# Patient Record
Sex: FEMALE | Race: WHITE | Employment: OTHER | URBAN - NONMETROPOLITAN AREA
[De-identification: names, ages, dates, MRNs, and addresses within clinical notes are randomized per-mention and may not be internally consistent; named-entity substitution may affect disease eponyms.]

---

## 2017-02-08 ENCOUNTER — HOSPITAL ENCOUNTER (OUTPATIENT)
Dept: MRI IMAGING | Age: 62
Discharge: HOME | End: 2017-02-08
Payer: COMMERCIAL

## 2017-02-08 DIAGNOSIS — M25.562 ACUTE PAIN OF LEFT KNEE: ICD-10-CM

## 2017-02-08 PROCEDURE — 73721 MRI JNT OF LWR EXTRE W/O DYE: CPT

## 2017-03-23 ENCOUNTER — EMPLOYEE WELLNESS (OUTPATIENT)
Dept: OTHER | Age: 62
End: 2017-03-23

## 2017-03-23 LAB
CHOLESTEROL, TOTAL: 222 MG/DL (ref 160–199)
GLUCOSE BLD-MCNC: 90 MG/DL (ref 74–109)
HDLC SERPL-MCNC: 62 MG/DL (ref 65–121)
LDL CHOLESTEROL CALCULATED: 127 MG/DL
TRIGL SERPL-MCNC: 167 MG/DL (ref 150–199)

## 2017-09-22 LAB
AVERAGE GLUCOSE: NORMAL
HBA1C MFR BLD: 5.7 %

## 2017-09-29 RX ORDER — FUROSEMIDE 40 MG/1
40 TABLET ORAL DAILY
COMMUNITY
End: 2018-05-02 | Stop reason: SDUPTHER

## 2017-09-29 RX ORDER — MULTIVIT-MIN/IRON/FOLIC ACID/K 18-600-40
CAPSULE ORAL
COMMUNITY

## 2017-09-29 RX ORDER — ACETAMINOPHEN 160 MG
TABLET,DISINTEGRATING ORAL 2 TIMES DAILY
COMMUNITY

## 2017-09-29 RX ORDER — FOLIC ACID 0.8 MG
500 TABLET ORAL DAILY
COMMUNITY
End: 2017-10-02 | Stop reason: ALTCHOICE

## 2017-09-29 RX ORDER — LOSARTAN POTASSIUM 50 MG/1
50 TABLET ORAL DAILY
COMMUNITY
End: 2018-04-13 | Stop reason: SDUPTHER

## 2017-09-29 RX ORDER — ASPIRIN 81 MG/1
81 TABLET ORAL DAILY
COMMUNITY

## 2017-09-29 RX ORDER — DICLOFENAC SODIUM 75 MG/1
75 TABLET, DELAYED RELEASE ORAL 2 TIMES DAILY
COMMUNITY
End: 2019-06-11 | Stop reason: ALTCHOICE

## 2017-09-29 RX ORDER — HYDROCODONE BITARTRATE AND ACETAMINOPHEN 5; 325 MG/1; MG/1
1 TABLET ORAL DAILY PRN
COMMUNITY
End: 2017-10-02 | Stop reason: SDUPTHER

## 2017-09-29 RX ORDER — CYCLOBENZAPRINE HCL 10 MG
10 TABLET ORAL NIGHTLY PRN
COMMUNITY
End: 2017-10-02 | Stop reason: SDUPTHER

## 2017-10-02 ENCOUNTER — OFFICE VISIT (OUTPATIENT)
Dept: FAMILY MEDICINE CLINIC | Age: 62
End: 2017-10-02
Payer: COMMERCIAL

## 2017-10-02 VITALS
HEART RATE: 88 BPM | RESPIRATION RATE: 18 BRPM | BODY MASS INDEX: 36.82 KG/M2 | DIASTOLIC BLOOD PRESSURE: 86 MMHG | WEIGHT: 221 LBS | OXYGEN SATURATION: 96 % | TEMPERATURE: 97.5 F | SYSTOLIC BLOOD PRESSURE: 138 MMHG | HEIGHT: 65 IN

## 2017-10-02 DIAGNOSIS — M54.50 CHRONIC MIDLINE LOW BACK PAIN WITHOUT SCIATICA: ICD-10-CM

## 2017-10-02 DIAGNOSIS — G56.03 BILATERAL CARPAL TUNNEL SYNDROME: ICD-10-CM

## 2017-10-02 DIAGNOSIS — I82.409 RECURRENT DEEP VEIN THROMBOSIS (DVT) (HCC): ICD-10-CM

## 2017-10-02 DIAGNOSIS — I26.99 RECURRENT PULMONARY EMBOLISM (HCC): ICD-10-CM

## 2017-10-02 DIAGNOSIS — G89.29 CHRONIC MIDLINE LOW BACK PAIN WITHOUT SCIATICA: ICD-10-CM

## 2017-10-02 DIAGNOSIS — E78.01 FAMILIAL HYPERCHOLESTEROLEMIA: ICD-10-CM

## 2017-10-02 DIAGNOSIS — G47.33 OBSTRUCTIVE SLEEP APNEA SYNDROME: ICD-10-CM

## 2017-10-02 DIAGNOSIS — I10 ESSENTIAL (PRIMARY) HYPERTENSION: Primary | ICD-10-CM

## 2017-10-02 DIAGNOSIS — F41.1 GENERALIZED ANXIETY DISORDER: ICD-10-CM

## 2017-10-02 PROCEDURE — 99214 OFFICE O/P EST MOD 30 MIN: CPT | Performed by: FAMILY MEDICINE

## 2017-10-02 RX ORDER — CYCLOBENZAPRINE HCL 10 MG
10 TABLET ORAL NIGHTLY PRN
Qty: 30 TABLET | Refills: 0 | Status: SHIPPED | OUTPATIENT
Start: 2017-10-02 | End: 2018-05-01 | Stop reason: SDUPTHER

## 2017-10-02 RX ORDER — HYDROCODONE BITARTRATE AND ACETAMINOPHEN 5; 325 MG/1; MG/1
1 TABLET ORAL DAILY PRN
Qty: 15 TABLET | Refills: 0 | Status: SHIPPED | OUTPATIENT
Start: 2017-10-02 | End: 2018-05-01 | Stop reason: SDUPTHER

## 2017-10-02 NOTE — MR AVS SNAPSHOT
After Visit Summary             Shaw Gonsalves   10/2/2017 3:00 PM   Office Visit    Description:  Female : 1955   Provider:  Sabrina Farley MD   Department:  [facility]              Your Follow-Up and Future Appointments         Below is a list of your follow-up and future appointments. This may not be a complete list as you may have made appointments directly with providers that we are not aware of or your providers may have made some for you. Please call your providers to confirm appointments. It is important to keep your appointments. Please bring your current insurance card, photo ID, co-pay, and all medication bottles to your appointment. If self-pay, payment is expected at the time of service. Your To-Do List     Future Appointments Provider Department Dept Phone    2018 4:15 PM Sabrina Farley MD [address] [phone]    If this is a sports or school physical please bring the physical form with you. Follow-Up    Return in about 6 months (around 2018) for Yearly Physical.         Information from Your Visit        Department     Name Address Phone Fax    [address] [address] [place] [address] [phone]      You Were Seen for:         Comments    Essential (primary) hypertension   [092174]         Vital Signs     Blood Pressure Pulse Temperature Respirations Height Weight    138/86 88 97.5 °F (36.4 °C) 18 5' 5\" (1.651 m) 221 lb (100.2 kg)    Oxygen Saturation Body Mass Index Smoking Status             96% 36.78 kg/m2 Passive Smoke Exposure - Never Smoker         Additional Information about your Body Mass Index (BMI)           Your BMI as listed above is considered obese (30 or more). BMI is an estimate of body fat, calculated from your height and weight.   The higher your BMI, the greater your risk of heart disease, high blood pressure, type 2 diabetes, stroke, gallstones, arthritis, sleep apnea, and certain cancers. BMI is not perfect. It may overestimate body fat in athletes and people who are more muscular. Even a small weight loss (between 5 and 10 percent of your current weight) by decreasing your calorie intake and becoming more physically active will help lower your risk of developing or worsening diseases associated with obesity. Learn more at: [email or web address]             Today's Medication Changes          These changes are accurate as of: 10/2/17  3:36 PM.  If you have any questions, ask your nurse or doctor. STOP taking these medications           Ginger Root 550 MG Caps   Stopped by:  Ellen Pinto MD       Magnesium 500 MG Caps   Stopped by:  Ellen Pinto MD            Where to Get Your Medications      These medications were sent to [address] [phone]  [address] [place] [address]     Phone:  [phone]     cyclobenzaprine 10 MG tablet    HYDROcodone-acetaminophen 5-325 MG per tablet               Your Current Medications Are              metFORMIN (GLUCOPHAGE) 500 MG tablet Take 500 mg by mouth 2 times daily (with meals)    HYDROcodone-acetaminophen (NORCO) 5-325 MG per tablet Take 1 tablet by mouth daily as needed for Pain .     cyclobenzaprine (FLEXERIL) 10 MG tablet Take 1 tablet by mouth nightly as needed for Muscle spasms    aspirin 81 MG EC tablet Take 81 mg by mouth daily    diclofenac (VOLTAREN) 75 MG EC tablet Take 75 mg by mouth 2 times daily    apixaban (ELIQUIS) 5 MG TABS tablet Take 5 mg by mouth 2 times daily    furosemide (LASIX) 40 MG tablet Take 40 mg by mouth daily    losartan (COZAAR) 50 MG tablet Take 50 mg by mouth daily    metoprolol tartrate (LOPRESSOR) 25 MG tablet Take 12.5 mg by mouth 2 times daily    Ascorbic Acid (VITAMIN C) 500 MG CAPS Take by mouth 3. Enter your [employer] Access Code exactly as it appears below. You will not need to use this code after youve completed the sign-up process. If you do not sign up before the expiration date, you must request a new code. [employer] Access Code: [email or web address]  Expires: 12/1/2017  3:36 PM    4. Enter your Social Security Number ([id number]) and Date of Birth (mm/dd/yyyy) as indicated and click Submit. You will be taken to the next sign-up page. 5. Create a s[employer]ox ID. This will be your s[employer] login ID and cannot be changed, so think of one that is secure and easy to remember. 6. Create a [employer] password. You can change your password at any time. 7. Enter your Password Reset Question and Answer. This can be used at a later time if you forget your password. 8. Enter your e-mail address. You will receive e-mail notification when new information is available in [address] 9. Click Sign Up. You can now view your medical record. Additional Information  If you have questions, please contact the physician practice where you receive care. Remember, [employer] is NOT to be used for urgent needs. For medical emergencies, dial 911. For questions regarding your [employer] account call [phone]. If you have a clinical question, please call your doctor's office.

## 2017-10-02 NOTE — PROGRESS NOTES
TOTAL ABDOMINAL      KNEE ARTHROSCOPY      TUBAL LIGATION         Family History   Problem Relation Age of Onset    Heart Disease Mother     Heart Disease Father        Social History   Substance Use Topics    Smoking status: Passive Smoke Exposure - Never Smoker    Smokeless tobacco: Never Used    Alcohol use Not on file      Current Outpatient Prescriptions   Medication Sig Dispense Refill    metFORMIN (GLUCOPHAGE) 500 MG tablet Take 500 mg by mouth 2 times daily (with meals)      HYDROcodone-acetaminophen (NORCO) 5-325 MG per tablet Take 1 tablet by mouth daily as needed for Pain . 15 tablet 0    cyclobenzaprine (FLEXERIL) 10 MG tablet Take 1 tablet by mouth nightly as needed for Muscle spasms 30 tablet 0    aspirin 81 MG EC tablet Take 81 mg by mouth daily      diclofenac (VOLTAREN) 75 MG EC tablet Take 75 mg by mouth 2 times daily      apixaban (ELIQUIS) 5 MG TABS tablet Take 5 mg by mouth 2 times daily      furosemide (LASIX) 40 MG tablet Take 40 mg by mouth daily      losartan (COZAAR) 50 MG tablet Take 50 mg by mouth daily      metoprolol tartrate (LOPRESSOR) 25 MG tablet Take 12.5 mg by mouth 2 times daily      Ascorbic Acid (VITAMIN C) 500 MG CAPS Take by mouth      Cholecalciferol (VITAMIN D3) 2000 units CAPS Take by mouth 2 times daily       No current facility-administered medications for this visit. Allergies   Allergen Reactions    Ceclor [Cefaclor]     Ciprofloxacin Hcl     Ultram [Tramadol]     Zocor [Simvastatin]        Health Maintenance   Topic Date Due    Hepatitis C screen  1955    HIV screen  10/23/1972    DTaP/Tdap/Td vaccine (1 - Tdap) 10/23/1976    Colon cancer screen colonoscopy  10/23/2007    Breast cancer screen  11/04/2016    Flu vaccine (1) 09/01/2017    Lipid screen  09/03/2017       Subjective:      Review of Systems  Review of Systems   Constitutional: Negative for chills and fever. HENT: Negative for congestion.     Respiratory: Negative for cough, chest tightness and shortness of breath. Cardiovascular: Negative for chest pain, palpitations and leg swelling. Gastrointestinal: Negative for abdominal pain, anal bleeding, constipation, diarrhea and nausea. Genitourinary: Negative for difficulty urinating. Psychiatric/Behavioral: Negative. See HPI for visit specific review of symptoms. All others negative      Objective:   /86  Pulse 88  Temp 97.5 °F (36.4 °C)  Resp 18  Ht 5' 5\" (1.651 m)  Wt 221 lb (100.2 kg)  SpO2 96%  BMI 36.78 kg/m2  Physical Exam  Physical Exam   Constitutional: appears well-developed. does not appear ill. Eyes: Pupils are equal, round, and reactive to light. Neck: Normal range of motion. Neck supple. Cardiovascular: Normal rate and regular rhythm. Exam reveals no friction rub. No murmur heard. Pulmonary/Chest: Effort normal and breath sounds normal. No respiratory distress. He has no wheezes. no rales. Abdominal: Soft. Bowel sounds are normal. He exhibits no distension. There is no tenderness. There is no rebound and no guarding. Musculoskeletal: exhibits no edema. Neurological: alert. Psychiatric: normal mood and affect. behavior is normal.       No results found for this or any previous visit (from the past 672 hour(s)). Glucose 90    Cholesterol 223  Triglycerides 208  HDL 53      A1c 5.7    Assessment & Plan: The following diagnoses and conditions are stable with no further orders unless indicated:  1. Essential (primary) hypertension  Borderline today. Continue with current medication. Discussed lifestyle changes such as diet and exercise. Recommended eliminate processed food from diet such as sugar and fried foods. Recommended exercising at least 150 minutes/week. Try to do full body resistance training twice a week as well. Offered suggestions for calorie counting such as phone apps and online resources such as My fitness pal and Lose it.   Discussed healthy weight. 2. Bilateral carpal tunnel syndrome  Symptoms are stable. Recommend wearing wrist braces    3. Generalized anxiety disorder  Overall symptoms are currently stable. 4. Familial hypercholesterolemia  Cholesterol is still elevated. Encouraged lifestyle changes    5. Chronic midline low back pain without sciatica  She tries to take this medication sparingly. Discussed risk and benefits of taking opioids. Risks include addiction and tolerance. If develops impairment or over sedation with medication, discontinue and contact me. Do not take medication with alcohol. Advised to take sparingly. Advised to keep medication hidden and locked up as risk of theft is high with these medications as well.       - HYDROcodone-acetaminophen (NORCO) 5-325 MG per tablet; Take 1 tablet by mouth daily as needed for Pain . Dispense: 15 tablet; Refill: 0  - cyclobenzaprine (FLEXERIL) 10 MG tablet; Take 1 tablet by mouth nightly as needed for Muscle spasms  Dispense: 30 tablet; Refill: 0    6. Obstructive sleep apnea syndrome  Continue his CPAP    7. Recurrent deep vein thrombosis (DVT) (HCC)  Remain on anticoagulation    8. Recurrent pulmonary embolism ([address]  Remain on anticoagulation          Return in about 6 months (around 4/2/2018) for Yearly Physical.           Discussed use, benefit, and side effects of prescribed medications. All patient questions answered. Pt voiced understanding. Reviewed health maintenance. Instructed to continue current medications, diet and exercise. Patient agreed with treatment plan. Follow up as directed.

## 2017-12-14 ENCOUNTER — CLINICAL DOCUMENTATION (OUTPATIENT)
Dept: PHARMACY | Facility: CLINIC | Age: 62
End: 2017-12-14

## 2017-12-14 NOTE — PROGRESS NOTES
Pharmacy Pop Care Documentation:   Patient application received. Patient missing First provider visit for DM and Urine albumin test yearly. Letter sent.

## 2017-12-14 NOTE — LETTER
Pat Kaur   [place] [id number]           12/14/17     Dear Willie Gonzalez,    Thanks so much for taking the first step towards better health. This letter is to inform you that we have received your enrollment form for the [facility] DM Program but you are missing the following requirements or documentation:    Documentation of 2017 provider visit; 2017 Urine Albumin result    To continue to qualify for this program the above requirements must be met by 12/15/17. Results or visits obtained outside of [facility] will need to be provided by fax or email to the numbers listed below before the deadline in order to qualify for the program.    If requirements are not met by the date listed above you will be disqualified from the program and the credit valued at $600 towards your diabetic medications and supplies will be revoked. You will be able to reapply the following calendar year. [facility] Team        [phone] Option #7    Email: [email or web address]    Fax Number: [phone]

## 2017-12-14 NOTE — PROGRESS NOTES
Pharmacy Pop Care Documentation:      The application for Pat Kaur for enrollment into the diabetes management program has been reviewed and accepted on 12/14/17.     Zunilda Mello

## 2017-12-20 NOTE — PROGRESS NOTES
Called patient to inform missing office note. No answer left luis WELLER  [address] [facility]  Direct: [phone]  Department, toll free: [phone], option 7

## 2018-03-08 ENCOUNTER — TELEPHONE (OUTPATIENT)
Dept: PRIMARY CARE CLINIC | Age: 63
End: 2018-03-08

## 2018-03-20 VITALS — WEIGHT: 228 LBS

## 2018-03-28 ENCOUNTER — EMPLOYEE WELLNESS (OUTPATIENT)
Dept: OTHER | Age: 63
End: 2018-03-28

## 2018-03-28 LAB
CHOLESTEROL, TOTAL: 233 MG/DL (ref 160–199)
GLUCOSE BLD-MCNC: 98 MG/DL (ref 74–109)
HDLC SERPL-MCNC: 75 MG/DL (ref 65–121)
LDL CHOLESTEROL CALCULATED: 120 MG/DL
TRIGL SERPL-MCNC: 188 MG/DL (ref 0–149)

## 2018-04-02 VITALS — BODY MASS INDEX: 37.77 KG/M2 | WEIGHT: 227 LBS

## 2018-04-05 DIAGNOSIS — Z76.0 MEDICATION REFILL: Primary | ICD-10-CM

## 2018-04-13 RX ORDER — LOSARTAN POTASSIUM 50 MG/1
TABLET ORAL
Qty: 90 TABLET | Refills: 3 | Status: SHIPPED | OUTPATIENT
Start: 2018-04-13 | End: 2019-04-09 | Stop reason: SDUPTHER

## 2018-04-20 DIAGNOSIS — Z00.00 ANNUAL PHYSICAL EXAM: Primary | ICD-10-CM

## 2018-05-01 ENCOUNTER — OFFICE VISIT (OUTPATIENT)
Dept: FAMILY MEDICINE CLINIC | Age: 63
End: 2018-05-01
Payer: COMMERCIAL

## 2018-05-01 VITALS
OXYGEN SATURATION: 97 % | TEMPERATURE: 98.7 F | RESPIRATION RATE: 18 BRPM | DIASTOLIC BLOOD PRESSURE: 82 MMHG | SYSTOLIC BLOOD PRESSURE: 132 MMHG | HEART RATE: 95 BPM | WEIGHT: 228 LBS | BODY MASS INDEX: 37.94 KG/M2

## 2018-05-01 DIAGNOSIS — Z23 NEED FOR PROPHYLACTIC VACCINATION OR INOCULATION AGAINST DIPHTHERIA AND TETANUS: Primary | ICD-10-CM

## 2018-05-01 DIAGNOSIS — Z00.00 ANNUAL PHYSICAL EXAM: ICD-10-CM

## 2018-05-01 DIAGNOSIS — M54.50 CHRONIC MIDLINE LOW BACK PAIN WITHOUT SCIATICA: ICD-10-CM

## 2018-05-01 DIAGNOSIS — G47.33 OBSTRUCTIVE SLEEP APNEA SYNDROME: ICD-10-CM

## 2018-05-01 DIAGNOSIS — Z12.31 ENCOUNTER FOR SCREENING MAMMOGRAM FOR BREAST CANCER: ICD-10-CM

## 2018-05-01 DIAGNOSIS — E78.01 FAMILIAL HYPERCHOLESTEROLEMIA: ICD-10-CM

## 2018-05-01 DIAGNOSIS — I10 ESSENTIAL (PRIMARY) HYPERTENSION: ICD-10-CM

## 2018-05-01 DIAGNOSIS — F41.1 GENERALIZED ANXIETY DISORDER: ICD-10-CM

## 2018-05-01 DIAGNOSIS — G89.29 CHRONIC MIDLINE LOW BACK PAIN WITHOUT SCIATICA: ICD-10-CM

## 2018-05-01 DIAGNOSIS — I82.409 RECURRENT DEEP VEIN THROMBOSIS (DVT) (HCC): ICD-10-CM

## 2018-05-01 PROCEDURE — 99396 PREV VISIT EST AGE 40-64: CPT | Performed by: FAMILY MEDICINE

## 2018-05-01 PROCEDURE — 90715 TDAP VACCINE 7 YRS/> IM: CPT | Performed by: FAMILY MEDICINE

## 2018-05-01 PROCEDURE — 90471 IMMUNIZATION ADMIN: CPT | Performed by: FAMILY MEDICINE

## 2018-05-01 RX ORDER — HYDROCODONE BITARTRATE AND ACETAMINOPHEN 5; 325 MG/1; MG/1
1 TABLET ORAL DAILY PRN
Qty: 15 TABLET | Refills: 0 | Status: SHIPPED | OUTPATIENT
Start: 2018-05-01 | End: 2018-11-06 | Stop reason: SDUPTHER

## 2018-05-01 RX ORDER — CYCLOBENZAPRINE HCL 10 MG
10 TABLET ORAL NIGHTLY PRN
Qty: 30 TABLET | Refills: 0 | Status: SHIPPED | OUTPATIENT
Start: 2018-05-01 | End: 2018-07-11 | Stop reason: SDUPTHER

## 2018-05-01 ASSESSMENT — ENCOUNTER SYMPTOMS
DIARRHEA: 0
CHEST TIGHTNESS: 0
ANAL BLEEDING: 0
CONSTIPATION: 0
SHORTNESS OF BREATH: 0
ABDOMINAL PAIN: 0
COUGH: 0
NAUSEA: 0

## 2018-05-03 RX ORDER — FUROSEMIDE 40 MG/1
TABLET ORAL
Qty: 90 TABLET | Refills: 3 | Status: SHIPPED | OUTPATIENT
Start: 2018-05-03 | End: 2020-08-25 | Stop reason: SDUPTHER

## 2018-05-04 ENCOUNTER — OFFICE VISIT (OUTPATIENT)
Dept: FAMILY MEDICINE CLINIC | Age: 63
End: 2018-05-04
Payer: COMMERCIAL

## 2018-05-04 VITALS
HEART RATE: 102 BPM | BODY MASS INDEX: 37.49 KG/M2 | OXYGEN SATURATION: 97 % | HEIGHT: 65 IN | DIASTOLIC BLOOD PRESSURE: 82 MMHG | TEMPERATURE: 96.6 F | SYSTOLIC BLOOD PRESSURE: 142 MMHG | WEIGHT: 225 LBS

## 2018-05-04 DIAGNOSIS — T50.A95A LOCAL REACTION TO TETANUS VACCINE, INITIAL ENCOUNTER: Primary | ICD-10-CM

## 2018-05-04 PROCEDURE — 99213 OFFICE O/P EST LOW 20 MIN: CPT | Performed by: NURSE PRACTITIONER

## 2018-05-04 ASSESSMENT — ENCOUNTER SYMPTOMS
SHORTNESS OF BREATH: 0
CHEST TIGHTNESS: 0
NAUSEA: 1
WHEEZING: 0
DIARRHEA: 0
COLOR CHANGE: 1
ABDOMINAL PAIN: 0

## 2018-06-04 RX ORDER — APIXABAN 5 MG/1
TABLET, FILM COATED ORAL
Qty: 60 TABLET | Refills: 2 | Status: SHIPPED | OUTPATIENT
Start: 2018-06-04 | End: 2018-09-09 | Stop reason: SDUPTHER

## 2018-07-11 DIAGNOSIS — M54.50 CHRONIC MIDLINE LOW BACK PAIN WITHOUT SCIATICA: ICD-10-CM

## 2018-07-11 DIAGNOSIS — G89.29 CHRONIC MIDLINE LOW BACK PAIN WITHOUT SCIATICA: ICD-10-CM

## 2018-07-11 RX ORDER — CYCLOBENZAPRINE HCL 10 MG
10 TABLET ORAL NIGHTLY PRN
Qty: 30 TABLET | Refills: 0 | Status: SHIPPED | OUTPATIENT
Start: 2018-07-11 | End: 2018-09-27 | Stop reason: SDUPTHER

## 2018-07-11 NOTE — TELEPHONE ENCOUNTER
From: aJyant Cordova  Sent: 7/11/2018 3:26 PM CDT  Subject: Medication Renewal Request    Jayant Cordova would like a refill of the following medications:     cyclobenzaprine (FLEXERIL) 10 MG tablet Boris Greer MD]   Patient Comment: Using it mainly with Benadryl to sleep at night    Preferred pharmacy: [facility], [address] [place] [address] [phone]

## 2018-07-18 RX ORDER — TRAZODONE HYDROCHLORIDE 50 MG/1
50 TABLET ORAL NIGHTLY
Qty: 30 TABLET | Refills: 5 | Status: SHIPPED | OUTPATIENT
Start: 2018-07-18 | End: 2018-11-06 | Stop reason: DRUGHIGH

## 2018-07-31 ENCOUNTER — OFFICE VISIT (OUTPATIENT)
Dept: FAMILY MEDICINE CLINIC | Age: 63
End: 2018-07-31
Payer: COMMERCIAL

## 2018-07-31 VITALS
HEART RATE: 102 BPM | TEMPERATURE: 97.8 F | OXYGEN SATURATION: 97 % | WEIGHT: 224.4 LBS | BODY MASS INDEX: 37.34 KG/M2 | DIASTOLIC BLOOD PRESSURE: 72 MMHG | SYSTOLIC BLOOD PRESSURE: 118 MMHG

## 2018-07-31 DIAGNOSIS — M17.0 PRIMARY OSTEOARTHRITIS OF BOTH KNEES: Primary | ICD-10-CM

## 2018-07-31 PROCEDURE — 99212 OFFICE O/P EST SF 10 MIN: CPT | Performed by: CLINICAL NURSE SPECIALIST

## 2018-07-31 ASSESSMENT — ENCOUNTER SYMPTOMS: BACK PAIN: 1

## 2018-07-31 NOTE — PROGRESS NOTES
SUBJECTIVE:  Myke Green is a 63 y.o. who presents today for Forms (Patient is here to get a handicap form filled out.)      TRISTEN Rivera presents today for handicap permit renewal.  She has had handicap permit for the last year due to end stage, primary osteoarthritis to both knees. She cannot walk farther than 200 feet due to pain without stopping. She has underlying chronic back pain as well. She is monitored by ortho, but is not surgical candidate due to clotting history. Past Medical History:   Diagnosis Date    Chronic midline low back pain without sciatica 10/2/2017    Essential (primary) hypertension 10/2/2017    Familial hypercholesterolemia 10/2/2017    Generalized anxiety disorder 10/2/2017    Obstructive sleep apnea syndrome 10/2/2017    Osteoarthritis, knee      Past Surgical History:   Procedure Laterality Date    CHOLECYSTECTOMY      HYSTERECTOMY, TOTAL ABDOMINAL      KNEE ARTHROSCOPY      TUBAL LIGATION       Family History   Problem Relation Age of Onset    Heart Disease Mother     Heart Disease Father      Social History   Substance Use Topics    Smoking status: Passive Smoke Exposure - Never Smoker    Smokeless tobacco: Never Used    Alcohol use Not on file     Current Outpatient Prescriptions   Medication Sig Dispense Refill    traZODone (DESYREL) 50 MG tablet Take 1 tablet by mouth nightly 30 tablet 5    cyclobenzaprine (FLEXERIL) 10 MG tablet Take 1 tablet by mouth nightly as needed for Muscle spasms 30 tablet 0    ELIQUIS 5 MG TABS tablet TAKE ONE TABLET BY MOUTH TWICE A DAY 60 tablet 2    furosemide (LASIX) 40 MG tablet TAKE 1 TABLET DAILY PRN 90 tablet 3    Turmeric 450 MG CAPS Take by mouth      losartan (COZAAR) 50 MG tablet TAKE 1 TABLET DAILY.  90 tablet 3    metFORMIN (GLUCOPHAGE) 500 MG tablet TAKE 1 TABLET TWICE DAILY 180 tablet 3    metoprolol tartrate (LOPRESSOR) 25 MG tablet Take 0.5 tablets by mouth 2 times daily 60 tablet 3    aspirin 81 MG EC

## 2018-08-22 RX ORDER — TRAZODONE HYDROCHLORIDE 100 MG/1
100 TABLET ORAL NIGHTLY
Qty: 90 TABLET | Refills: 3 | Status: SHIPPED | OUTPATIENT
Start: 2018-08-22 | End: 2019-03-14 | Stop reason: ALTCHOICE

## 2018-08-27 ENCOUNTER — HOSPITAL ENCOUNTER (OUTPATIENT)
Dept: WOMENS IMAGING | Age: 63
Discharge: HOME | End: 2018-08-27
Payer: COMMERCIAL

## 2018-08-27 DIAGNOSIS — Z12.31 ENCOUNTER FOR SCREENING MAMMOGRAM FOR BREAST CANCER: ICD-10-CM

## 2018-08-27 PROCEDURE — 77067 SCR MAMMO BI INCL CAD: CPT

## 2018-09-10 RX ORDER — APIXABAN 5 MG/1
TABLET, FILM COATED ORAL
Qty: 60 TABLET | Refills: 2 | Status: SHIPPED | OUTPATIENT
Start: 2018-09-10 | End: 2018-12-11 | Stop reason: SDUPTHER

## 2018-09-27 DIAGNOSIS — G89.29 CHRONIC MIDLINE LOW BACK PAIN WITHOUT SCIATICA: ICD-10-CM

## 2018-09-27 DIAGNOSIS — M54.50 CHRONIC MIDLINE LOW BACK PAIN WITHOUT SCIATICA: ICD-10-CM

## 2018-09-28 RX ORDER — CYCLOBENZAPRINE HCL 10 MG
TABLET ORAL
Qty: 30 TABLET | Refills: 0 | Status: SHIPPED | OUTPATIENT
Start: 2018-09-28 | End: 2018-11-06 | Stop reason: SDUPTHER

## 2018-11-06 ENCOUNTER — OFFICE VISIT (OUTPATIENT)
Dept: FAMILY MEDICINE CLINIC | Age: 63
End: 2018-11-06
Payer: COMMERCIAL

## 2018-11-06 VITALS
TEMPERATURE: 97.8 F | WEIGHT: 227 LBS | DIASTOLIC BLOOD PRESSURE: 70 MMHG | BODY MASS INDEX: 37.82 KG/M2 | SYSTOLIC BLOOD PRESSURE: 126 MMHG | HEART RATE: 74 BPM | OXYGEN SATURATION: 97 % | HEIGHT: 65 IN

## 2018-11-06 DIAGNOSIS — M17.0 PRIMARY OSTEOARTHRITIS OF BOTH KNEES: ICD-10-CM

## 2018-11-06 DIAGNOSIS — Z51.81 MEDICATION MONITORING ENCOUNTER: ICD-10-CM

## 2018-11-06 DIAGNOSIS — I10 ESSENTIAL (PRIMARY) HYPERTENSION: ICD-10-CM

## 2018-11-06 DIAGNOSIS — I82.409 RECURRENT DEEP VEIN THROMBOSIS (DVT) (HCC): ICD-10-CM

## 2018-11-06 DIAGNOSIS — M54.50 CHRONIC MIDLINE LOW BACK PAIN WITHOUT SCIATICA: Primary | ICD-10-CM

## 2018-11-06 DIAGNOSIS — G89.29 CHRONIC MIDLINE LOW BACK PAIN WITHOUT SCIATICA: Primary | ICD-10-CM

## 2018-11-06 LAB

## 2018-11-06 PROCEDURE — 80305 DRUG TEST PRSMV DIR OPT OBS: CPT | Performed by: FAMILY MEDICINE

## 2018-11-06 PROCEDURE — 99214 OFFICE O/P EST MOD 30 MIN: CPT | Performed by: FAMILY MEDICINE

## 2018-11-06 RX ORDER — HYDROCODONE BITARTRATE AND ACETAMINOPHEN 5; 325 MG/1; MG/1
1 TABLET ORAL DAILY PRN
Qty: 15 TABLET | Refills: 0 | Status: SHIPPED | OUTPATIENT
Start: 2018-11-06 | End: 2018-12-06

## 2018-11-06 RX ORDER — TIMOLOL MALEATE/LATANOPROST/PF 0.5-0.005%
DROPS OPHTHALMIC (EYE)
COMMUNITY
End: 2021-11-22

## 2018-11-06 RX ORDER — CYCLOBENZAPRINE HCL 10 MG
TABLET ORAL
Qty: 30 TABLET | Refills: 0 | Status: SHIPPED | OUTPATIENT
Start: 2018-11-06 | End: 2019-03-14 | Stop reason: SDUPTHER

## 2018-11-06 NOTE — PROGRESS NOTES
 TUBAL LIGATION         Family History   Problem Relation Age of Onset    Heart Disease Mother     Heart Disease Father        Social History   Substance Use Topics    Smoking status: Passive Smoke Exposure - Never Smoker    Smokeless tobacco: Never Used    Alcohol use Not on file     Current Outpatient Prescriptions   Medication Sig Dispense Refill    brimonidine (ALPHAGAN P) 0.1 % SOLN 1 drop 2 times daily      Latanoprost-Timolol Maleate 0.005-0.5 % SOLN Apply to eye      HYDROcodone-acetaminophen (NORCO) 5-325 MG per tablet Take 1 tablet by mouth daily as needed for Pain for up to 30 days. . 15 tablet 0    cyclobenzaprine (FLEXERIL) 10 MG tablet TAKE 1 TABLET BY MOUTH EVERY NIGHT AT BEDTIME AS NEEDED FOR MUSCLE SPASMS 30 tablet 0    ELIQUIS 5 MG TABS tablet TAKE ONE TABLET TWO TIMES A DAY 60 tablet 2    traZODone (DESYREL) 100 MG tablet Take 1 tablet by mouth nightly 90 tablet 3    furosemide (LASIX) 40 MG tablet TAKE 1 TABLET DAILY PRN 90 tablet 3    Turmeric 450 MG CAPS Take by mouth      losartan (COZAAR) 50 MG tablet TAKE 1 TABLET DAILY. 90 tablet 3    metoprolol tartrate (LOPRESSOR) 25 MG tablet Take 0.5 tablets by mouth 2 times daily 60 tablet 3    aspirin 81 MG EC tablet Take 81 mg by mouth daily      Ascorbic Acid (VITAMIN C) 500 MG CAPS Take by mouth      Cholecalciferol (VITAMIN D3) 2000 units CAPS Take by mouth 2 times daily      diclofenac (VOLTAREN) 75 MG EC tablet Take 75 mg by mouth 2 times daily       No current facility-administered medications for this visit.       Allergies   Allergen Reactions    Ceclor [Cefaclor]     Ciprofloxacin Hcl     Ultram [Tramadol]      fatigue    Zocor [Simvastatin]      cough       Health Maintenance   Topic Date Due    Hepatitis C screen  1955    HIV screen  10/23/1972    Shingles Vaccine (1 of 2 - 2 Dose Series) 10/23/2007    Colon cancer screen colonoscopy  10/23/2007    Potassium monitoring  09/04/2013    Creatinine

## 2018-11-08 ENCOUNTER — HOSPITAL ENCOUNTER (OUTPATIENT)
Dept: GENERAL RADIOLOGY | Age: 63
Discharge: HOME | End: 2018-11-08
Payer: COMMERCIAL

## 2018-11-08 ENCOUNTER — HOSPITAL ENCOUNTER (OUTPATIENT)
Dept: GENERAL RADIOLOGY | Age: 63
End: 2018-11-08
Payer: COMMERCIAL

## 2018-11-08 ENCOUNTER — OFFICE VISIT (OUTPATIENT)
Dept: URGENT CARE | Age: 63
End: 2018-11-08
Payer: COMMERCIAL

## 2018-11-08 VITALS
OXYGEN SATURATION: 98 % | WEIGHT: 227 LBS | BODY MASS INDEX: 37.82 KG/M2 | TEMPERATURE: 97.8 F | SYSTOLIC BLOOD PRESSURE: 135 MMHG | RESPIRATION RATE: 18 BRPM | HEART RATE: 87 BPM | DIASTOLIC BLOOD PRESSURE: 78 MMHG | HEIGHT: 65 IN

## 2018-11-08 DIAGNOSIS — S69.91XA INJURY OF RIGHT HAND, INITIAL ENCOUNTER: ICD-10-CM

## 2018-11-08 DIAGNOSIS — S69.91XA INJURY OF RIGHT HAND, INITIAL ENCOUNTER: Primary | ICD-10-CM

## 2018-11-08 PROCEDURE — 73130 X-RAY EXAM OF HAND: CPT

## 2018-11-08 PROCEDURE — 99212 OFFICE O/P EST SF 10 MIN: CPT | Performed by: NURSE PRACTITIONER

## 2018-11-08 ASSESSMENT — ENCOUNTER SYMPTOMS
DIARRHEA: 0
RHINORRHEA: 0
VOMITING: 0
NAUSEA: 0
SORE THROAT: 0
ABDOMINAL PAIN: 0
COUGH: 0
SHORTNESS OF BREATH: 0

## 2018-11-08 NOTE — PROGRESS NOTES
projects. Everyday wear and tear, especially as you get older, also can cause hand pain. Most minor hand injuries will heal on their own, and home treatment is usually all you need to do. If you have sudden and severe pain, you may need tests and treatment. Follow-up care is a key part of your treatment and safety. Be sure to make and go to all appointments, and call your doctor if you are having problems. It's also a good idea to know your test results and keep a list of the medicines you take. How can you care for yourself at home? · Take pain medicines exactly as directed. ? If the doctor gave you a prescription medicine for pain, take it as prescribed. ? If you are not taking a prescription pain medicine, ask your doctor if you can take an over-the-counter medicine. · Rest and protect your hand. Take a break from any activity that may cause pain. · Put ice or a cold pack on your hand for 10 to 20 minutes at a time. Put a thin cloth between the ice and your skin. · Prop up the sore hand on a pillow when you ice it or anytime you sit or lie down during the next 3 days. Try to keep it above the level of your heart. This will help reduce swelling. · If your doctor recommends a sling, splint, or elastic bandage to support your hand, wear it as directed. When should you call for help? Call 911 anytime you think you may need emergency care. For example, call if:    · Your hand turns cool or pale or changes color.    Call your doctor now or seek immediate medical care if:    · You cannot move your hand.     · Your hand pops, moves out of its normal position, and then returns to its normal position.     · You have signs of infection, such as:  ? Increased pain, swelling, warmth, or redness. ? Red streaks leading from the sore area. ? Pus draining from a place on your hand.   ? A fever.     · Your hand feels numb or tingly.    Watch closely for changes in your health, and be sure to contact your doctor if:

## 2019-03-11 RX ORDER — APIXABAN 5 MG/1
TABLET, FILM COATED ORAL
Qty: 60 TABLET | Refills: 2 | Status: SHIPPED | OUTPATIENT
Start: 2019-03-11 | End: 2019-06-13 | Stop reason: SDUPTHER

## 2019-03-12 ENCOUNTER — EMPLOYEE WELLNESS (OUTPATIENT)
Dept: OTHER | Age: 64
End: 2019-03-12

## 2019-03-12 LAB
CHOLESTEROL, TOTAL: 221 MG/DL (ref 160–199)
GLUCOSE BLD-MCNC: 88 MG/DL (ref 74–109)
HDLC SERPL-MCNC: 66 MG/DL (ref 65–121)
LDL CHOLESTEROL CALCULATED: 132 MG/DL
TRIGL SERPL-MCNC: 116 MG/DL (ref 0–149)

## 2019-03-14 ENCOUNTER — OFFICE VISIT (OUTPATIENT)
Dept: FAMILY MEDICINE CLINIC | Age: 64
End: 2019-03-14
Payer: COMMERCIAL

## 2019-03-14 VITALS
TEMPERATURE: 97.4 F | BODY MASS INDEX: 36.61 KG/M2 | DIASTOLIC BLOOD PRESSURE: 84 MMHG | SYSTOLIC BLOOD PRESSURE: 118 MMHG | OXYGEN SATURATION: 96 % | HEART RATE: 87 BPM | WEIGHT: 220 LBS

## 2019-03-14 DIAGNOSIS — F41.8 DEPRESSION WITH ANXIETY: Primary | ICD-10-CM

## 2019-03-14 DIAGNOSIS — G89.29 CHRONIC MIDLINE LOW BACK PAIN WITHOUT SCIATICA: ICD-10-CM

## 2019-03-14 DIAGNOSIS — Z13.31 POSITIVE DEPRESSION SCREENING: ICD-10-CM

## 2019-03-14 DIAGNOSIS — M54.50 CHRONIC MIDLINE LOW BACK PAIN WITHOUT SCIATICA: ICD-10-CM

## 2019-03-14 DIAGNOSIS — F51.04 CHRONIC INSOMNIA: ICD-10-CM

## 2019-03-14 PROCEDURE — G0444 DEPRESSION SCREEN ANNUAL: HCPCS | Performed by: FAMILY MEDICINE

## 2019-03-14 PROCEDURE — G8431 POS CLIN DEPRES SCRN F/U DOC: HCPCS | Performed by: FAMILY MEDICINE

## 2019-03-14 PROCEDURE — 99213 OFFICE O/P EST LOW 20 MIN: CPT | Performed by: FAMILY MEDICINE

## 2019-03-14 RX ORDER — CYCLOBENZAPRINE HCL 10 MG
TABLET ORAL
Qty: 30 TABLET | Refills: 0 | Status: SHIPPED | OUTPATIENT
Start: 2019-03-14 | End: 2019-04-09 | Stop reason: SDUPTHER

## 2019-03-14 RX ORDER — HYDROXYZINE HYDROCHLORIDE 25 MG/1
TABLET, FILM COATED ORAL
Qty: 60 TABLET | Refills: 5 | Status: SHIPPED | OUTPATIENT
Start: 2019-03-14 | End: 2019-11-18 | Stop reason: SDUPTHER

## 2019-03-14 ASSESSMENT — PATIENT HEALTH QUESTIONNAIRE - PHQ9
7. TROUBLE CONCENTRATING ON THINGS, SUCH AS READING THE NEWSPAPER OR WATCHING TELEVISION: 2
8. MOVING OR SPEAKING SO SLOWLY THAT OTHER PEOPLE COULD HAVE NOTICED. OR THE OPPOSITE, BEING SO FIGETY OR RESTLESS THAT YOU HAVE BEEN MOVING AROUND A LOT MORE THAN USUAL: 3
SUM OF ALL RESPONSES TO PHQ QUESTIONS 1-9: 22
SUM OF ALL RESPONSES TO PHQ9 QUESTIONS 1 & 2: 6
5. POOR APPETITE OR OVEREATING: 3
9. THOUGHTS THAT YOU WOULD BE BETTER OFF DEAD, OR OF HURTING YOURSELF: 0
10. IF YOU CHECKED OFF ANY PROBLEMS, HOW DIFFICULT HAVE THESE PROBLEMS MADE IT FOR YOU TO DO YOUR WORK, TAKE CARE OF THINGS AT HOME, OR GET ALONG WITH OTHER PEOPLE: 2
1. LITTLE INTEREST OR PLEASURE IN DOING THINGS: 3
2. FEELING DOWN, DEPRESSED OR HOPELESS: 3
SUM OF ALL RESPONSES TO PHQ QUESTIONS 1-9: 22
4. FEELING TIRED OR HAVING LITTLE ENERGY: 3
6. FEELING BAD ABOUT YOURSELF - OR THAT YOU ARE A FAILURE OR HAVE LET YOURSELF OR YOUR FAMILY DOWN: 2
3. TROUBLE FALLING OR STAYING ASLEEP: 3

## 2019-03-16 ASSESSMENT — ENCOUNTER SYMPTOMS
ABDOMINAL PAIN: 0
BACK PAIN: 1
CHEST TIGHTNESS: 0
COUGH: 0
ANAL BLEEDING: 0
SHORTNESS OF BREATH: 0
CONSTIPATION: 0
NAUSEA: 0
DIARRHEA: 0

## 2019-03-20 VITALS — WEIGHT: 217 LBS | BODY MASS INDEX: 36.11 KG/M2

## 2019-03-21 DIAGNOSIS — Z76.0 MEDICATION REFILL: ICD-10-CM

## 2019-03-25 ENCOUNTER — OFFICE VISIT (OUTPATIENT)
Dept: FAMILY MEDICINE CLINIC | Age: 64
End: 2019-03-25
Payer: COMMERCIAL

## 2019-03-25 VITALS
BODY MASS INDEX: 36.28 KG/M2 | DIASTOLIC BLOOD PRESSURE: 88 MMHG | HEART RATE: 96 BPM | WEIGHT: 218 LBS | OXYGEN SATURATION: 97 % | SYSTOLIC BLOOD PRESSURE: 138 MMHG | TEMPERATURE: 97.5 F

## 2019-03-25 DIAGNOSIS — F41.1 GENERALIZED ANXIETY DISORDER: Primary | ICD-10-CM

## 2019-03-25 PROCEDURE — 99213 OFFICE O/P EST LOW 20 MIN: CPT | Performed by: FAMILY MEDICINE

## 2019-03-25 RX ORDER — SERTRALINE HYDROCHLORIDE 100 MG/1
100 TABLET, FILM COATED ORAL DAILY
Qty: 30 TABLET | Refills: 5 | Status: SHIPPED | OUTPATIENT
Start: 2019-03-25 | End: 2019-05-06 | Stop reason: SDUPTHER

## 2019-04-09 DIAGNOSIS — G89.29 CHRONIC MIDLINE LOW BACK PAIN WITHOUT SCIATICA: ICD-10-CM

## 2019-04-09 DIAGNOSIS — M54.50 CHRONIC MIDLINE LOW BACK PAIN WITHOUT SCIATICA: ICD-10-CM

## 2019-04-09 RX ORDER — LOSARTAN POTASSIUM 50 MG/1
50 TABLET ORAL DAILY
Qty: 90 TABLET | Refills: 3 | Status: SHIPPED | OUTPATIENT
Start: 2019-04-09 | End: 2020-04-09 | Stop reason: SDUPTHER

## 2019-04-09 RX ORDER — CYCLOBENZAPRINE HCL 10 MG
TABLET ORAL
Qty: 30 TABLET | Refills: 0 | Status: SHIPPED | OUTPATIENT
Start: 2019-04-09 | End: 2019-07-10 | Stop reason: SDUPTHER

## 2019-05-06 RX ORDER — SERTRALINE HYDROCHLORIDE 100 MG/1
100 TABLET, FILM COATED ORAL DAILY
Qty: 30 TABLET | Refills: 1 | Status: SHIPPED | OUTPATIENT
Start: 2019-05-06 | End: 2019-10-15 | Stop reason: SDUPTHER

## 2019-05-06 NOTE — TELEPHONE ENCOUNTER
Chrissie South called requesting a refill of the below medication which has been pended for you:     Requested Prescriptions     Pending Prescriptions Disp Refills    sertraline (ZOLOFT) 100 MG tablet 30 tablet 1     Sig: Take 1 tablet by mouth daily       Last Appointment Date: 3/25/2019  Next Appointment Date: Visit date not found    Allergies   Allergen Reactions    Ceclor [Cefaclor]     Ciprofloxacin Hcl     Ultram [Tramadol]      fatigue    Zocor [Simvastatin]      cough         Had to cancel today's appointment, will be rescheduling.

## 2019-06-11 ENCOUNTER — OFFICE VISIT (OUTPATIENT)
Dept: URGENT CARE | Age: 64
End: 2019-06-11
Payer: COMMERCIAL

## 2019-06-11 ENCOUNTER — HOSPITAL ENCOUNTER (OUTPATIENT)
Dept: GENERAL RADIOLOGY | Age: 64
Discharge: HOME | End: 2019-06-11
Payer: COMMERCIAL

## 2019-06-11 VITALS
DIASTOLIC BLOOD PRESSURE: 84 MMHG | BODY MASS INDEX: 37.32 KG/M2 | HEIGHT: 65 IN | WEIGHT: 224 LBS | TEMPERATURE: 97.4 F | HEART RATE: 82 BPM | SYSTOLIC BLOOD PRESSURE: 139 MMHG | RESPIRATION RATE: 18 BRPM | OXYGEN SATURATION: 98 %

## 2019-06-11 DIAGNOSIS — S69.91XA INJURY OF RIGHT HAND, INITIAL ENCOUNTER: ICD-10-CM

## 2019-06-11 DIAGNOSIS — S69.91XA INJURY OF RIGHT WRIST, INITIAL ENCOUNTER: ICD-10-CM

## 2019-06-11 DIAGNOSIS — S69.91XA INJURY OF RIGHT HAND, INITIAL ENCOUNTER: Primary | ICD-10-CM

## 2019-06-11 PROCEDURE — 73110 X-RAY EXAM OF WRIST: CPT

## 2019-06-11 PROCEDURE — 99213 OFFICE O/P EST LOW 20 MIN: CPT | Performed by: NURSE PRACTITIONER

## 2019-06-11 PROCEDURE — 73130 X-RAY EXAM OF HAND: CPT

## 2019-06-11 NOTE — LETTER
[id number] [facility]  [address] [facility] [address]  Phone: [address] APRN        June 11, 2019     Patient: Yuri Leon   YOB: 1955   Date of Visit: 6/11/2019       To Whom it May Concern:    Yuri Leon was seen in my clinic on 6/11/2019. She may return to work on 06/13/2019. If you have any questions or concerns, please don't hesitate to call.     Sincerely,         Miriam Perez, SAQIB

## 2019-06-11 NOTE — PROGRESS NOTES
[address] [facility]  [address] [facility] [place] [address]  Dept: [phone]  Loc: [phone]    Zander Garner is a 66 y.o. female who presents today for her medical conditions/complaintsas noted below. Zander Garner is c/o of Wrist Pain (right; patient fell last night)        HPI:     TRISTEN Zamudio presents today with right wrist and hand pain. She states that last night she was getting up after using the bathroom when she lost her  and her hand went back in between the toilet and the trash can. It is very painful and swollen. She can still feel and move it, but certain positions she can't do without severe pain. Her hand is bruised.    Past Medical History:   Diagnosis Date    Chronic midline low back pain without sciatica 10/2/2017    Essential (primary) hypertension 10/2/2017    Familial hypercholesterolemia 10/2/2017    Generalized anxiety disorder 10/2/2017    Glaucoma     Obstructive sleep apnea syndrome 10/2/2017    Osteoarthritis, knee      Past Surgical History:   Procedure Laterality Date    CHOLECYSTECTOMY      HYSTERECTOMY, TOTAL ABDOMINAL      KNEE ARTHROSCOPY      TUBAL LIGATION         Family History   Problem Relation Age of Onset    Heart Disease Mother     Heart Disease Father        Social History     Tobacco Use    Smoking status: Passive Smoke Exposure - Never Smoker    Smokeless tobacco: Never Used   Substance Use Topics    Alcohol use: Not on file      Current Outpatient Medications   Medication Sig Dispense Refill    sertraline (ZOLOFT) 100 MG tablet Take 1 tablet by mouth daily 30 tablet 1    losartan (COZAAR) 50 MG tablet Take 1 tablet by mouth daily 90 tablet 3    cyclobenzaprine (FLEXERIL) 10 MG tablet TAKE 1 TABLET BY MOUTH EVERY NIGHT AT BEDTIME AS NEEDED FOR MUSCLE SPASMS 30 tablet 0    metoprolol tartrate (LOPRESSOR) 25 MG tablet TAKE ONE - HALF (1/2) TABLET BY MOUTH TWO TIMES A DAY 30 tablet 3    hydrOXYzine (ATARAX) 25 MG tablet 1-2 PO QHS PRN INSOMNIA 60 tablet 5    ELIQUIS 5 MG TABS tablet TAKE 1 TABLET BY MOUTH 2 TIMES A DAY 60 tablet 2    brimonidine (ALPHAGAN P) 0.1 % SOLN 1 drop 2 times daily      Latanoprost-Timolol Maleate 0.005-0.5 % SOLN Apply to eye      furosemide (LASIX) 40 MG tablet TAKE 1 TABLET DAILY PRN 90 tablet 3    Turmeric 450 MG CAPS Take by mouth      aspirin 81 MG EC tablet Take 81 mg by mouth daily      Ascorbic Acid (VITAMIN C) 500 MG CAPS Take by mouth      Cholecalciferol (VITAMIN D3) 2000 units CAPS Take by mouth 2 times daily       No current facility-administered medications for this visit. Allergies   Allergen Reactions    Ceclor [Cefaclor]     Ciprofloxacin Hcl     Ultram [Tramadol]      fatigue    Zocor [Simvastatin]      cough       Health Maintenance   Topic Date Due    Hepatitis C screen  1955    HIV screen  10/23/1972    Shingles Vaccine (1 of 2) 10/23/2007    Colon cancer screen colonoscopy  10/23/2007    Potassium monitoring  09/04/2013    Creatinine monitoring  09/04/2013    A1C test (Diabetic or Prediabetic)  09/22/2018    Breast cancer screen  08/27/2019    Lipid screen  03/12/2024    DTaP/Tdap/Td vaccine (2 - Td) 05/01/2028    Flu vaccine  Completed    Pneumococcal 0-64 years Vaccine  Aged Out       Subjective:     Review of Systems   Musculoskeletal:        Right wrist and hand pain with swelling and bruising      All other systems reviewed and are negative.      :Objective      Physical Exam   Constitutional: She is oriented to person, place, and time. She appears well-developed and well-nourished. No distress. HENT:   Head: Normocephalic and atraumatic. Eyes: Pupils are equal, round, and reactive to light. Neck: Normal range of motion. Cardiovascular: Normal rate, regular rhythm and normal heart sounds. No murmur heard. Pulmonary/Chest: Effort normal and breath sounds normal. No respiratory distress.  She has no wheezes. Musculoskeletal:        Right wrist: She exhibits decreased range of motion, tenderness, swelling and deformity. She exhibits no laceration. Right hand: She exhibits decreased range of motion, tenderness and swelling. She exhibits normal capillary refill, no deformity and no laceration. Normal sensation noted. Decreased strength noted. Hands:  Neurological: She is alert and oriented to person, place, and time. Skin: Skin is warm and dry. No rash noted. She is not diaphoretic. Psychiatric: She has a normal mood and affect. Her behavior is normal.   Nursing note and vitals reviewed. /84   Pulse 82   Temp 97.4 °F (36.3 °C)   Resp 18   Ht 5' 5\" (1.651 m)   Wt 224 lb (101.6 kg)   SpO2 98%   BMI 37.28 kg/m²     :Assessment       Diagnosis Orders   1. Injury of right hand, initial encounter  XR HAND RIGHT (MIN 3 VIEWS)   2. Injury of right wrist, initial encounter  XR WRIST RIGHT (MIN 3 VIEWS)       :Plan    xray Hand:  Posterior soft tissue swelling with no acute osseous injury. Xray Wrist:   No acute bony abnormality appreciated. 1. Rest  2. Ice packs 20 mins on 20 mins off  3. Elevation  4. Ace wrap   5. Return to clinic as needed with new or worsening symptoms   Orders Placed This Encounter   Procedures    XR HAND RIGHT (MIN 3 VIEWS)     Standing Status:   Future     Number of Occurrences:   1     Standing Expiration Date:   6/11/2020     Order Specific Question:   Reason for exam:     Answer:   injury last night    XR WRIST RIGHT (MIN 3 VIEWS)     Standing Status:   Future     Number of Occurrences:   1     Standing Expiration Date:   6/11/2020     Order Specific Question:   Reason for exam:     Answer:   injury last night       Return if symptoms worsen or fail to improve. No orders of the defined types were placed in this encounter. Patient given educational materials- see patient instructions.   Discussed use, benefit, and side effects of signs of infection, such as:  ? Increased pain, swelling, warmth, or redness. ? Red streaks leading from the sore area. ? Pus draining from a place on your hand. ? A fever.     · Your hand feels numb or tingly.    Watch closely for changes in your health, and be sure to contact your doctor if:    · Your hand feels unstable when you try to use it.     · You do not get better as expected.     · You have any new symptoms, such as swelling.     · Bruises from an injury to your hand last longer than 2 weeks. Where can you learn more? Go to [email or web address] [email or web address] and sign in to your tagga account. Enter R273 in the Puridify box to learn more about \"Hand Pain: Care Instructions. \"     If you do not have an account, please click on the \"Sign Up Now\" link. Current as of: September 23, 2018  Content Version: 12.0  © 8295-5129 [employer]. Care instructions adapted under license by [facility] ([facility]). If you have questions about a medical condition or this instruction, always ask your healthcare professional. [place] [address] any warranty or liability for your use of this information. Patient Education        Learning About RICE (Rest, Ice, Compression, and Elevation)  What is RICE? RICE is a way to care for an injury. RICE helps relieve pain and swelling. It may also help with healing and flexibility. RICE stands for:  · Rest and protect the injured or sore area. · Ice or a cold pack used as soon as possible. · Compression, or wrapping the injured or sore area with an elastic bandage. · Elevation (propping up) the injured or sore area. How do you do RICE? You can use RICE for home treatment when you have general aches and pains or after an injury or surgery. Rest  · Do not put weight on the injury for at least 24 to 48 hours. · Use crutches for a badly sprained knee or ankle.   · Support a sprained wrist, elbow, or shoulder with a sling.  Ice  · Put ice or a cold pack on the injury right away to reduce pain and swelling. Frozen vegetables will also work as an ice pack. Put a thin cloth between the ice or cold pack and your skin. The cloth protects the injured area from getting too cold. · Use ice for 10 to 15 minutes at a time for the first 48 to 72 hours. Compression  · Use compression for sprains, strains, and surgeries of the arms and legs. · Wrap the injured area with an elastic bandage or compression sleeve to reduce swelling. · Don't wrap it too tightly. If the area below it feels numb, tingles, or feels cool, loosen the wrap. Elevation  · Use elevation for areas of the body that can be propped up, such as arms and legs. · Prop up the injured area on pillows whenever you use ice. Keep it propped up anytime you sit or lie down. · Try to keep the injured area at or above the level of your heart. This will help reduce swelling and bruising. Where can you learn more? Go to [email or web address] Technologies Private [email or web address].new test [email or web address] org and sign in to your DocOnYou account. Enter A516 in the Extreme [employer]o [employer] box to learn more about \"Learning About RICE (Rest, Ice, Compression, and Elevation). \"     If you do not have an account, please click on the \"Sign Up Now\" link. Current as of: September 20, 2018  Content Version: 12.0  © 1867-1393 [employer]. Care instructions adapted under license by [facility] ([facility]). If you have questions about a medical condition or this instruction, always ask your healthcare professional. [place] [address] any warranty or liability for your use of this information. Patient Education        Wrist Sprain: Care Instructions  Your Care Instructions    Your wrist hurts because you have stretched or torn ligaments, which connect the bones in your wrist.  Wrist sprains usually take from 2 to 10 weeks to heal, but some take longer.  Usually, the more pain you have, the more severe your wrist sprain is and the longer it will take to heal. You can heal faster and regain strength in your wrist with good home treatment. Follow-up care is a key part of your treatment and safety. Be sure to make and go to all appointments, and call your doctor if you are having problems. It's also a good idea to know your test results and keep a list of the medicines you take. How can you care for yourself at home? · Prop up your arm on a pillow when you ice it or anytime you sit or lie down for the next 3 days. Try to keep your wrist above the level of your heart. This will help reduce swelling. · Put ice or cold packs on your wrist for 10 to 20 minutes at a time. Try to do this every 1 to 2 hours for the next 3 days (when you are awake) or until the swelling goes down. Put a thin cloth between the ice pack and your skin. · After 2 or 3 days, if your swelling is gone, apply a heating pad set on low or a warm cloth to your wrist. This helps keep your wrist flexible. Some doctors suggest that you go back and forth between hot and cold. · If you have an elastic bandage, keep it on for the next 24 to 36 hours. The bandage should be snug but not so tight that it causes numbness or tingling. To rewrap the wrist, wrap the bandage around the hand a few times, beginning at the fingers. Then wrap it around the hand between the thumb and index finger, ending by circling the wrist several times. · If your doctor gave you a splint or brace, wear it as directed to protect your wrist until it has healed. · Take pain medicines exactly as directed. ? If the doctor gave you a prescription medicine for pain, take it as prescribed. ? If you are not taking a prescription pain medicine, ask your doctor if you can take an over-the-counter medicine. · Try not to use your injured wrist and hand. When should you call for help?   Call your doctor now or seek immediate medical care if:    · Your hand or fingers are cool or pale or change color.    Watch closely for changes in your health, and be sure to contact your doctor if:    · Your pain gets worse.     · Your wrist has not improved after 1 week. Where can you learn more? Go to https://Bad Seed EntertainmentpeLoxysoft [email or web address].[email or web address] org and sign in to your Sports MatchMaker account. Enter H312 in the [facility] box to learn more about \"Wrist Sprain: Care Instructions. \"     If you do not have an account, please click on the \"Sign Up Now\" link. Current as of: September 20, 2018  Content Version: 12.0  © 0524-4354 Healthwise, Incorporated. Care instructions adapted under license by [facility] ([facility]). If you have questions about a medical condition or this instruction, always ask your healthcare professional. [address] any warranty or liability for your use of this information. 1. Rest  2. Ice packs 20 mins on 20 mins off  3. Elevation  4. Ace wrap   5.  Return to clinic as needed with new or worsening symptoms         Electronically signed by SAQIB Lake on 6/11/2019 at 4:11 PM

## 2019-06-11 NOTE — PATIENT INSTRUCTIONS
Patient Education        Hand Pain: Care Instructions  Your Care Instructions    Common causes of hand pain are overuse and injuries, such as might happen during sports or home repair projects. Everyday wear and tear, especially as you get older, also can cause hand pain. Most minor hand injuries will heal on their own, and home treatment is usually all you need to do. If you have sudden and severe pain, you may need tests and treatment. Follow-up care is a key part of your treatment and safety. Be sure to make and go to all appointments, and call your doctor if you are having problems. It's also a good idea to know your test results and keep a list of the medicines you take. How can you care for yourself at home? · Take pain medicines exactly as directed. ? If the doctor gave you a prescription medicine for pain, take it as prescribed. ? If you are not taking a prescription pain medicine, ask your doctor if you can take an over-the-counter medicine. · Rest and protect your hand. Take a break from any activity that may cause pain. · Put ice or a cold pack on your hand for 10 to 20 minutes at a time. Put a thin cloth between the ice and your skin. · Prop up the sore hand on a pillow when you ice it or anytime you sit or lie down during the next 3 days. Try to keep it above the level of your heart. This will help reduce swelling. · If your doctor recommends a sling, splint, or elastic bandage to support your hand, wear it as directed. When should you call for help? Call 911 anytime you think you may need emergency care. For example, call if:    · Your hand turns cool or pale or changes color.    Call your doctor now or seek immediate medical care if:    · You cannot move your hand.     · Your hand pops, moves out of its normal position, and then returns to its normal position.     · You have signs of infection, such as:  ? Increased pain, swelling, warmth, or redness.   ? Red streaks leading from the sore area.  ? Pus draining from a place on your hand. ? A fever.     · Your hand feels numb or tingly.    Watch closely for changes in your health, and be sure to contact your doctor if:    · Your hand feels unstable when you try to use it.     · You do not get better as expected.     · You have any new symptoms, such as swelling.     · Bruises from an injury to your hand last longer than 2 weeks. Where can you learn more? Go to https://xChange Automotive.dMetrics. org and sign in to your [employer] account. Enter R273 in the GetFeedback box to learn more about \"Hand Pain: Care Instructions. \"     If you do not have an account, please click on the \"Sign Up Now\" link. Current as of: September 23, 2018  Content Version: 12.0  © 3927-1191 Healthwise, Incorporated. Care instructions adapted under license by [facility] ([facility]). If you have questions about a medical condition or this instruction, always ask your healthcare professional. [place] [address] any warranty or liability for your use of this information. Patient Education        Learning About RICE (Rest, Ice, Compression, and Elevation)  What is RICE? RICE is a way to care for an injury. RICE helps relieve pain and swelling. It may also help with healing and flexibility. RICE stands for:  · Rest and protect the injured or sore area. · Ice or a cold pack used as soon as possible. · Compression, or wrapping the injured or sore area with an elastic bandage. · Elevation (propping up) the injured or sore area. How do you do RICE? You can use RICE for home treatment when you have general aches and pains or after an injury or surgery. Rest  · Do not put weight on the injury for at least 24 to 48 hours. · Use crutches for a badly sprained knee or ankle. · Support a sprained wrist, elbow, or shoulder with a sling. Ice  · Put ice or a cold pack on the injury right away to reduce pain and swelling.  Frozen vegetables will also work as an ice pack. Put a thin cloth between the ice or cold pack and your skin. The cloth protects the injured area from getting too cold. · Use ice for 10 to 15 minutes at a time for the first 48 to 72 hours. Compression  · Use compression for sprains, strains, and surgeries of the arms and legs. · Wrap the injured area with an elastic bandage or compression sleeve to reduce swelling. · Don't wrap it too tightly. If the area below it feels numb, tingles, or feels cool, loosen the wrap. Elevation  · Use elevation for areas of the body that can be propped up, such as arms and legs. · Prop up the injured area on pillows whenever you use ice. Keep it propped up anytime you sit or lie down. · Try to keep the injured area at or above the level of your heart. This will help reduce swelling and bruising. Where can you learn more? Go to https://Digital Orchid.[email or web address] org and sign in to your [employer] Technologies account. Enter N103 in the Feuerlabs box to learn more about \"Learning About RICE (Rest, Ice, Compression, and Elevation). \"     If you do not have an account, please click on the \"Sign Up Now\" link. Current as of: September 20, 2018  Content Version: 12.0  © 5511-9807 [employer]. Care instructions adapted under license by [facility] ([facility]). If you have questions about a medical condition or this instruction, always ask your healthcare professional. [place] [address] any warranty or liability for your use of this information. Patient Education        Wrist Sprain: Care Instructions  Your Care Instructions    Your wrist hurts because you have stretched or torn ligaments, which connect the bones in your wrist.  Wrist sprains usually take from 2 to 10 weeks to heal, but some take longer.  Usually, the more pain you have, the more severe your wrist sprain is and the longer it will take to heal. You can heal faster and regain strength in your wrist with good home treatment. Follow-up care is a key part of your treatment and safety. Be sure to make and go to all appointments, and call your doctor if you are having problems. It's also a good idea to know your test results and keep a list of the medicines you take. How can you care for yourself at home? · Prop up your arm on a pillow when you ice it or anytime you sit or lie down for the next 3 days. Try to keep your wrist above the level of your heart. This will help reduce swelling. · Put ice or cold packs on your wrist for 10 to 20 minutes at a time. Try to do this every 1 to 2 hours for the next 3 days (when you are awake) or until the swelling goes down. Put a thin cloth between the ice pack and your skin. · After 2 or 3 days, if your swelling is gone, apply a heating pad set on low or a warm cloth to your wrist. This helps keep your wrist flexible. Some doctors suggest that you go back and forth between hot and cold. · If you have an elastic bandage, keep it on for the next 24 to 36 hours. The bandage should be snug but not so tight that it causes numbness or tingling. To rewrap the wrist, wrap the bandage around the hand a few times, beginning at the fingers. Then wrap it around the hand between the thumb and index finger, ending by circling the wrist several times. · If your doctor gave you a splint or brace, wear it as directed to protect your wrist until it has healed. · Take pain medicines exactly as directed. ? If the doctor gave you a prescription medicine for pain, take it as prescribed. ? If you are not taking a prescription pain medicine, ask your doctor if you can take an over-the-counter medicine. · Try not to use your injured wrist and hand. When should you call for help? Call your doctor now or seek immediate medical care if:    · Your hand or fingers are cool or pale or change color.    Watch closely for changes in your health, and be sure to contact your doctor if:    · Your pain gets worse.

## 2019-06-13 RX ORDER — APIXABAN 5 MG/1
TABLET, FILM COATED ORAL
Qty: 60 TABLET | Refills: 2 | Status: SHIPPED | OUTPATIENT
Start: 2019-06-13 | End: 2019-09-11 | Stop reason: SDUPTHER

## 2019-06-28 ENCOUNTER — TELEPHONE (OUTPATIENT)
Dept: FAMILY MEDICINE CLINIC | Age: 64
End: 2019-06-28

## 2019-06-28 DIAGNOSIS — Z13.0 SCREENING FOR DEFICIENCY ANEMIA: ICD-10-CM

## 2019-06-28 DIAGNOSIS — I10 ESSENTIAL (PRIMARY) HYPERTENSION: Primary | ICD-10-CM

## 2019-06-28 NOTE — TELEPHONE ENCOUNTER
Roge Harrington has an upcoming appt on 7/10/2019. No current orders are available in chart at this time. If labs are needed prior to this appointment, please ensure active orders are available as I have made her aware of our walk-in hours for the lab. If they are not needed, please contact patient to advise. Thank you.

## 2019-07-05 DIAGNOSIS — I10 ESSENTIAL (PRIMARY) HYPERTENSION: ICD-10-CM

## 2019-07-05 DIAGNOSIS — Z13.0 SCREENING FOR DEFICIENCY ANEMIA: ICD-10-CM

## 2019-07-05 LAB
ALBUMIN SERPL-MCNC: 3.9 G/DL (ref 3.5–5.2)
ALP BLD-CCNC: 54 U/L (ref 35–104)
ALT SERPL-CCNC: 16 U/L (ref 5–33)
ANION GAP SERPL CALCULATED.3IONS-SCNC: 12 MMOL/L (ref 7–19)
AST SERPL-CCNC: 15 U/L (ref 5–32)
BASOPHILS ABSOLUTE: 0 K/UL (ref 0–0.2)
BASOPHILS RELATIVE PERCENT: 0.4 % (ref 0–1)
BILIRUB SERPL-MCNC: <0.2 MG/DL (ref 0.2–1.2)
BUN BLDV-MCNC: 14 MG/DL (ref 8–23)
CALCIUM SERPL-MCNC: 8.7 MG/DL (ref 8.8–10.2)
CHLORIDE BLD-SCNC: 103 MMOL/L (ref 98–111)
CO2: 25 MMOL/L (ref 22–29)
CREAT SERPL-MCNC: 0.7 MG/DL (ref 0.5–0.9)
EOSINOPHILS ABSOLUTE: 0.2 K/UL (ref 0–0.6)
EOSINOPHILS RELATIVE PERCENT: 2.8 % (ref 0–5)
GFR NON-AFRICAN AMERICAN: >60
GLUCOSE BLD-MCNC: 84 MG/DL (ref 74–109)
HCT VFR BLD CALC: 42.6 % (ref 37–47)
HEMOGLOBIN: 13.4 G/DL (ref 12–16)
LYMPHOCYTES ABSOLUTE: 2.9 K/UL (ref 1.1–4.5)
LYMPHOCYTES RELATIVE PERCENT: 42.5 % (ref 20–40)
MCH RBC QN AUTO: 29 PG (ref 27–31)
MCHC RBC AUTO-ENTMCNC: 31.5 G/DL (ref 33–37)
MCV RBC AUTO: 92.2 FL (ref 81–99)
MONOCYTES ABSOLUTE: 0.7 K/UL (ref 0–0.9)
MONOCYTES RELATIVE PERCENT: 10.3 % (ref 0–10)
NEUTROPHILS ABSOLUTE: 2.9 K/UL (ref 1.5–7.5)
NEUTROPHILS RELATIVE PERCENT: 43.7 % (ref 50–65)
PDW BLD-RTO: 13.7 % (ref 11.5–14.5)
PLATELET # BLD: 267 K/UL (ref 130–400)
PMV BLD AUTO: 10.5 FL (ref 9.4–12.3)
POTASSIUM SERPL-SCNC: 4.3 MMOL/L (ref 3.5–5)
RBC # BLD: 4.62 M/UL (ref 4.2–5.4)
SODIUM BLD-SCNC: 140 MMOL/L (ref 136–145)
TOTAL PROTEIN: 6.3 G/DL (ref 6.6–8.7)
WBC # BLD: 6.7 K/UL (ref 4.8–10.8)

## 2019-07-10 ENCOUNTER — OFFICE VISIT (OUTPATIENT)
Dept: FAMILY MEDICINE CLINIC | Age: 64
End: 2019-07-10
Payer: COMMERCIAL

## 2019-07-10 VITALS
OXYGEN SATURATION: 98 % | SYSTOLIC BLOOD PRESSURE: 138 MMHG | WEIGHT: 227 LBS | TEMPERATURE: 97.8 F | DIASTOLIC BLOOD PRESSURE: 82 MMHG | BODY MASS INDEX: 37.77 KG/M2 | HEART RATE: 85 BPM

## 2019-07-10 DIAGNOSIS — M54.50 CHRONIC MIDLINE LOW BACK PAIN WITHOUT SCIATICA: ICD-10-CM

## 2019-07-10 DIAGNOSIS — F41.1 GENERALIZED ANXIETY DISORDER: ICD-10-CM

## 2019-07-10 DIAGNOSIS — G89.29 CHRONIC MIDLINE LOW BACK PAIN WITHOUT SCIATICA: ICD-10-CM

## 2019-07-10 DIAGNOSIS — Z12.31 ENCOUNTER FOR SCREENING MAMMOGRAM FOR BREAST CANCER: ICD-10-CM

## 2019-07-10 DIAGNOSIS — I10 ESSENTIAL (PRIMARY) HYPERTENSION: ICD-10-CM

## 2019-07-10 DIAGNOSIS — M17.0 PRIMARY OSTEOARTHRITIS OF BOTH KNEES: ICD-10-CM

## 2019-07-10 DIAGNOSIS — Z00.01 ENCOUNTER FOR WELL ADULT EXAM WITH ABNORMAL FINDINGS: Primary | ICD-10-CM

## 2019-07-10 DIAGNOSIS — E78.01 FAMILIAL HYPERCHOLESTEROLEMIA: ICD-10-CM

## 2019-07-10 PROCEDURE — 99396 PREV VISIT EST AGE 40-64: CPT | Performed by: CLINICAL NURSE SPECIALIST

## 2019-07-10 RX ORDER — CYCLOBENZAPRINE HCL 10 MG
TABLET ORAL
Qty: 90 TABLET | Refills: 2 | Status: SHIPPED | OUTPATIENT
Start: 2019-07-10 | End: 2019-12-28 | Stop reason: SDUPTHER

## 2019-07-10 RX ORDER — HYDROCODONE BITARTRATE AND ACETAMINOPHEN 5; 325 MG/1; MG/1
1 TABLET ORAL DAILY PRN
Qty: 30 TABLET | Refills: 0 | Status: SHIPPED | OUTPATIENT
Start: 2019-07-10 | End: 2021-07-18

## 2019-07-10 ASSESSMENT — ENCOUNTER SYMPTOMS
COLOR CHANGE: 0
FACIAL SWELLING: 0
SHORTNESS OF BREATH: 0
CONSTIPATION: 0
SINUS PRESSURE: 0
TROUBLE SWALLOWING: 0
EYE REDNESS: 0
EYE DISCHARGE: 0
BACK PAIN: 1
SORE THROAT: 0
ABDOMINAL PAIN: 0
COUGH: 0
DIARRHEA: 0
CHEST TIGHTNESS: 0
EYE PAIN: 0
WHEEZING: 0

## 2019-07-10 NOTE — PROGRESS NOTES
Problem Relation Age of Onset    Heart Disease Mother     Heart Disease Father      Social History     Tobacco Use    Smoking status: Passive Smoke Exposure - Never Smoker    Smokeless tobacco: Never Used   Substance Use Topics    Alcohol use: Not on file     Current Outpatient Medications   Medication Sig Dispense Refill    HYDROcodone-acetaminophen (NORCO) 5-325 MG per tablet Take 1 tablet by mouth daily as needed for Pain for up to 30 days. Intended supply: 3 days. Take lowest dose possible to manage pain 30 tablet 0    cyclobenzaprine (FLEXERIL) 10 MG tablet TAKE 1 TABLET BY MOUTH EVERY NIGHT AT BEDTIME AS NEEDED FOR MUSCLE SPASMS 90 tablet 2    ELIQUIS 5 MG TABS tablet TAKE 1 TABLET BY MOUTH TWO TIMES A DAY 60 tablet 2    sertraline (ZOLOFT) 100 MG tablet Take 1 tablet by mouth daily 30 tablet 1    losartan (COZAAR) 50 MG tablet Take 1 tablet by mouth daily 90 tablet 3    metoprolol tartrate (LOPRESSOR) 25 MG tablet TAKE ONE - HALF (1/2) TABLET BY MOUTH TWO TIMES A DAY 30 tablet 3    hydrOXYzine (ATARAX) 25 MG tablet 1-2 PO QHS PRN INSOMNIA 60 tablet 5    brimonidine (ALPHAGAN P) 0.1 % SOLN 1 drop 2 times daily      Latanoprost-Timolol Maleate 0.005-0.5 % SOLN Apply to eye      furosemide (LASIX) 40 MG tablet TAKE 1 TABLET DAILY PRN 90 tablet 3    Turmeric 450 MG CAPS Take by mouth      aspirin 81 MG EC tablet Take 81 mg by mouth daily      Ascorbic Acid (VITAMIN C) 500 MG CAPS Take by mouth      Cholecalciferol (VITAMIN D3) 2000 units CAPS Take by mouth 2 times daily       No current facility-administered medications for this visit. Allergies   Allergen Reactions    Ceclor [Cefaclor]     Ciprofloxacin Hcl     Ultram [Tramadol]      fatigue    Zocor [Simvastatin]      cough       Review of Systems   Constitutional: Positive for activity change. Negative for appetite change, chills, diaphoresis, fatigue and fever.    HENT: Negative for congestion, ear pain, facial swelling, hearing

## 2019-07-16 DIAGNOSIS — Z76.0 MEDICATION REFILL: ICD-10-CM

## 2019-09-10 ENCOUNTER — HOSPITAL ENCOUNTER (OUTPATIENT)
Dept: WOMENS IMAGING | Age: 64
Discharge: HOME | End: 2019-09-10
Payer: COMMERCIAL

## 2019-09-10 DIAGNOSIS — Z12.31 ENCOUNTER FOR SCREENING MAMMOGRAM FOR BREAST CANCER: ICD-10-CM

## 2019-09-10 PROCEDURE — 77067 SCR MAMMO BI INCL CAD: CPT

## 2019-09-12 RX ORDER — APIXABAN 5 MG/1
TABLET, FILM COATED ORAL
Qty: 60 TABLET | Refills: 2 | Status: SHIPPED | OUTPATIENT
Start: 2019-09-12 | End: 2020-03-24 | Stop reason: SDUPTHER

## 2019-09-25 ENCOUNTER — OFFICE VISIT (OUTPATIENT)
Dept: FAMILY MEDICINE CLINIC | Age: 64
End: 2019-09-25
Payer: COMMERCIAL

## 2019-09-25 VITALS
WEIGHT: 236.8 LBS | TEMPERATURE: 97.1 F | SYSTOLIC BLOOD PRESSURE: 154 MMHG | OXYGEN SATURATION: 99 % | BODY MASS INDEX: 39.41 KG/M2 | HEART RATE: 95 BPM | DIASTOLIC BLOOD PRESSURE: 92 MMHG

## 2019-09-25 DIAGNOSIS — J20.9 ACUTE BRONCHITIS, UNSPECIFIED ORGANISM: ICD-10-CM

## 2019-09-25 DIAGNOSIS — I10 ESSENTIAL (PRIMARY) HYPERTENSION: Primary | ICD-10-CM

## 2019-09-25 PROCEDURE — 99213 OFFICE O/P EST LOW 20 MIN: CPT | Performed by: NURSE PRACTITIONER

## 2019-09-25 RX ORDER — AZITHROMYCIN 250 MG/1
TABLET, FILM COATED ORAL
Qty: 6 TABLET | Refills: 0 | Status: SHIPPED | OUTPATIENT
Start: 2019-09-25 | End: 2020-01-09 | Stop reason: ALTCHOICE

## 2019-09-25 RX ORDER — METHYLPREDNISOLONE 4 MG/1
TABLET ORAL
Qty: 1 KIT | Refills: 0 | Status: SHIPPED | OUTPATIENT
Start: 2019-09-25 | End: 2019-10-01

## 2019-09-25 ASSESSMENT — ENCOUNTER SYMPTOMS
CHEST TIGHTNESS: 1
WHEEZING: 1
NAUSEA: 0
ABDOMINAL PAIN: 0
DIARRHEA: 0
SHORTNESS OF BREATH: 0
VOMITING: 0
SORE THROAT: 0
COUGH: 1

## 2019-09-25 NOTE — PROGRESS NOTES
Yamile Graham is a 66 y.o. female who presents today for  Chief Complaint   Patient presents with    Cough    Chest Congestion       HPI:  She has had cough and congestion for 6 days, initially with possible fever for 1 day but this is resolved. She has had some sinus pressure and nasal congestion which was more prevalent initially but now better. Now symptoms are more related to cough and chest congestion. She has occasional sputum. Has had intermittent chest tightness and wheeze as well. Pressure slightly elevated today but she has been taking cold/sinus medication including decongestants. BP is normally well controlled on current medication. Review of Systems   Constitutional: Negative for chills and fever. HENT: Positive for postnasal drip. Negative for congestion, ear pain and sore throat. Respiratory: Positive for cough, chest tightness and wheezing. Negative for shortness of breath. Cardiovascular: Negative for chest pain. Gastrointestinal: Negative for abdominal pain, diarrhea, nausea and vomiting. Musculoskeletal: Negative for arthralgias and myalgias. Skin: Negative for rash. Past Medical History:   Diagnosis Date    Chronic midline low back pain without sciatica 10/2/2017    Essential (primary) hypertension 10/2/2017    Familial hypercholesterolemia 10/2/2017    Generalized anxiety disorder 10/2/2017    Glaucoma     Obstructive sleep apnea syndrome 10/2/2017    Osteoarthritis, knee        Current Outpatient Medications   Medication Sig Dispense Refill    methylPREDNISolone (MEDROL DOSEPACK) 4 MG tablet Take by mouth as directed.  1 kit 0    azithromycin (ZITHROMAX) 250 MG tablet Take 2 tablets by mouth on day 1 followed by 1 tablet daily on days 2 - 5 6 tablet 0    ELIQUIS 5 MG TABS tablet TAKE 1 TABLET BY MOUTH TWO TIMES A DAY 60 tablet 2    metoprolol tartrate (LOPRESSOR) 25 MG tablet TAKE 1/2 TABLET TWO TIMES A DAY 30 tablet 3    cyclobenzaprine Instructions on file for this visit. Patient voicesunderstanding and agrees to plans along with risks and benefits of plan. Counseling:  Lizz Stewart's case, medications and options were discussed in detail. Patient was instructed to call the office if she questionsregarding her treatment. Should her conditions worsen, she should return to office to be reassessed by SAQIB Silveira. she Should to go the closest Emergency Department for any emergency. They verbalizedunderstanding the above instructions. No follow-ups on file.

## 2019-10-15 DIAGNOSIS — Z76.0 MEDICATION REFILL: ICD-10-CM

## 2019-10-15 RX ORDER — SERTRALINE HYDROCHLORIDE 100 MG/1
100 TABLET, FILM COATED ORAL DAILY
Qty: 30 TABLET | Refills: 1 | Status: SHIPPED | OUTPATIENT
Start: 2019-10-15 | End: 2019-12-28 | Stop reason: SDUPTHER

## 2019-11-18 RX ORDER — HYDROXYZINE HYDROCHLORIDE 25 MG/1
TABLET, FILM COATED ORAL
Qty: 60 TABLET | Refills: 5 | Status: SHIPPED | OUTPATIENT
Start: 2019-11-18 | End: 2020-08-01 | Stop reason: SDUPTHER

## 2019-12-17 RX ORDER — APIXABAN 5 MG/1
TABLET, FILM COATED ORAL
Qty: 60 TABLET | Refills: 2 | Status: SHIPPED | OUTPATIENT
Start: 2019-12-17 | End: 2020-01-09 | Stop reason: SDUPTHER

## 2019-12-28 DIAGNOSIS — M17.0 PRIMARY OSTEOARTHRITIS OF BOTH KNEES: ICD-10-CM

## 2019-12-28 DIAGNOSIS — G89.29 CHRONIC MIDLINE LOW BACK PAIN WITHOUT SCIATICA: ICD-10-CM

## 2019-12-28 DIAGNOSIS — M54.50 CHRONIC MIDLINE LOW BACK PAIN WITHOUT SCIATICA: ICD-10-CM

## 2019-12-30 RX ORDER — SERTRALINE HYDROCHLORIDE 100 MG/1
100 TABLET, FILM COATED ORAL DAILY
Qty: 30 TABLET | Refills: 1 | Status: SHIPPED | OUTPATIENT
Start: 2019-12-30 | End: 2020-02-21 | Stop reason: SDUPTHER

## 2019-12-30 RX ORDER — CYCLOBENZAPRINE HCL 10 MG
TABLET ORAL
Qty: 90 TABLET | Refills: 2 | Status: SHIPPED | OUTPATIENT
Start: 2019-12-30 | End: 2020-11-02 | Stop reason: SDUPTHER

## 2020-01-03 DIAGNOSIS — E78.01 FAMILIAL HYPERCHOLESTEROLEMIA: ICD-10-CM

## 2020-01-03 DIAGNOSIS — I10 ESSENTIAL (PRIMARY) HYPERTENSION: ICD-10-CM

## 2020-01-03 LAB
ALBUMIN SERPL-MCNC: 4 G/DL (ref 3.5–5.2)
ALP BLD-CCNC: 52 U/L (ref 35–104)
ALT SERPL-CCNC: 15 U/L (ref 5–33)
ANION GAP SERPL CALCULATED.3IONS-SCNC: 13 MMOL/L (ref 7–19)
AST SERPL-CCNC: 19 U/L (ref 5–32)
BILIRUB SERPL-MCNC: 0.3 MG/DL (ref 0.2–1.2)
BUN BLDV-MCNC: 10 MG/DL (ref 8–23)
CALCIUM SERPL-MCNC: 9 MG/DL (ref 8.8–10.2)
CHLORIDE BLD-SCNC: 102 MMOL/L (ref 98–111)
CHOLESTEROL, TOTAL: 266 MG/DL (ref 160–199)
CO2: 25 MMOL/L (ref 22–29)
CREAT SERPL-MCNC: 0.7 MG/DL (ref 0.5–0.9)
GFR NON-AFRICAN AMERICAN: >60
GLUCOSE BLD-MCNC: 95 MG/DL (ref 74–109)
HCT VFR BLD CALC: 43.8 % (ref 37–47)
HDLC SERPL-MCNC: 69 MG/DL (ref 65–121)
HEMOGLOBIN: 13.8 G/DL (ref 12–16)
LDL CHOLESTEROL CALCULATED: 156 MG/DL
MCH RBC QN AUTO: 28.8 PG (ref 27–31)
MCHC RBC AUTO-ENTMCNC: 31.5 G/DL (ref 33–37)
MCV RBC AUTO: 91.3 FL (ref 81–99)
PDW BLD-RTO: 13.5 % (ref 11.5–14.5)
PLATELET # BLD: 252 K/UL (ref 130–400)
PMV BLD AUTO: 10.1 FL (ref 9.4–12.3)
POTASSIUM SERPL-SCNC: 4 MMOL/L (ref 3.5–5)
RBC # BLD: 4.8 M/UL (ref 4.2–5.4)
SODIUM BLD-SCNC: 140 MMOL/L (ref 136–145)
TOTAL PROTEIN: 6.6 G/DL (ref 6.6–8.7)
TRIGL SERPL-MCNC: 203 MG/DL (ref 0–149)
WBC # BLD: 6.7 K/UL (ref 4.8–10.8)

## 2020-01-09 ENCOUNTER — OFFICE VISIT (OUTPATIENT)
Dept: FAMILY MEDICINE CLINIC | Age: 65
End: 2020-01-09
Payer: COMMERCIAL

## 2020-01-09 VITALS
WEIGHT: 250 LBS | TEMPERATURE: 97.8 F | OXYGEN SATURATION: 98 % | DIASTOLIC BLOOD PRESSURE: 82 MMHG | SYSTOLIC BLOOD PRESSURE: 122 MMHG | BODY MASS INDEX: 41.6 KG/M2 | HEART RATE: 108 BPM

## 2020-01-09 PROCEDURE — 99214 OFFICE O/P EST MOD 30 MIN: CPT | Performed by: FAMILY MEDICINE

## 2020-01-09 NOTE — PROGRESS NOTES
[facility] [address] [place] [address]  [address] [address]  Dept: [phone]  Dept Fax: [phone]  Loc: [phone]    Leopold Bers is a 60 y.o. female who presents today for her medical conditions/complaints as noted below. Leopold Bers is here for 6 Month Follow-Up        HPI:   CC: Here today to discuss the following:  She continues to have issues with bilateral knee pain and bilateral hip pain. She is had buckling of her knees bilaterally as well. No swelling or redness. No recent injury. This is been a chronic issue for her. She also has pain with both hips. Pain with bending twisting and stooping. No recent falls. Lower back is been a problem as well. She continues to take Flexeril as needed. She has a history of recurrent pulmonary embolisms requiring chronic anticoagulation. No adverse effects with the Eliquis. Hypertension  Compliant with medications. No adverse effects from medication. No lightheadedness, palpitations, or chest pain. Depression with Anxiety  Compliant with medications. No adverse effects from medication. Depression and anxiety symptoms are stable today. No suicidal or homicidal ideation. Excessive worry, insomnia, and anxiousness are stable. Energy, concentration, and apathy are stable.                   HPI    Past Medical History:   Diagnosis Date    Chronic midline low back pain without sciatica 10/2/2017    Essential (primary) hypertension 10/2/2017    Familial hypercholesterolemia 10/2/2017    Generalized anxiety disorder 10/2/2017    Glaucoma     Obstructive sleep apnea syndrome 10/2/2017    Osteoarthritis, knee       Past Surgical History:   Procedure Laterality Date    CHOLECYSTECTOMY      HYSTERECTOMY, TOTAL ABDOMINAL      KNEE ARTHROSCOPY      TUBAL LIGATION         Family History   Problem Relation Age of Onset    Heart Disease Mother     Heart Disease Father Negative straight leg raise. Normal gait. Normal plantar and dorsalflexion. Neurological:      Mental Status: She is alert. Motor: No abnormal muscle tone. Coordination: Coordination normal.      Deep Tendon Reflexes: Reflexes normal.   Psychiatric:         Behavior: Behavior normal.           Recent Results (from the past 672 hour(s))   Lipid Panel    Collection Time: 01/03/20  6:51 AM   Result Value Ref Range    Cholesterol, Total 266 (H) 160 - 199 mg/dL    Triglycerides 203 (H) 0 - 149 mg/dL    HDL 69 65 - 121 mg/dL    LDL Calculated 156 <100 mg/dL   Comprehensive Metabolic Panel    Collection Time: 01/03/20  6:51 AM   Result Value Ref Range    Sodium 140 136 - 145 mmol/L    Potassium 4.0 3.5 - 5.0 mmol/L    Chloride 102 98 - 111 mmol/L    CO2 25 22 - 29 mmol/L    Anion Gap 13 7 - 19 mmol/L    Glucose 95 74 - 109 mg/dL    BUN 10 8 - 23 mg/dL    CREATININE 0.7 0.5 - 0.9 mg/dL    GFR Non-African American >60 >60    Calcium 9.0 8.8 - 10.2 mg/dL    Total Protein 6.6 6.6 - 8.7 g/dL    Alb 4.0 3.5 - 5.2 g/dL    Total Bilirubin 0.3 0.2 - 1.2 mg/dL    Alkaline Phosphatase 52 35 - 104 U/L    ALT 15 5 - 33 U/L    AST 19 5 - 32 U/L   CBC    Collection Time: 01/03/20  6:51 AM   Result Value Ref Range    WBC 6.7 4.8 - 10.8 K/uL    RBC 4.80 4.20 - 5.40 M/uL    Hemoglobin 13.8 12.0 - 16.0 g/dL    Hematocrit 43.8 37.0 - 47.0 %    MCV 91.3 81.0 - 99.0 fL    MCH 28.8 27.0 - 31.0 pg    MCHC 31.5 (L) 33.0 - 37.0 g/dL    RDW 13.5 11.5 - 14.5 %    Platelets 023 933 - 030 K/uL    MPV 10.1 9.4 - 12.3 fL               Assessment & Plan: The following diagnoses and conditions are stable with no further orders unless indicated:  1. Pain of both hip joints    - XR HIP BILATERAL W AP PELVIS (2 VIEWS); Future    2. Bilateral low back pain without sciatica, unspecified chronicity    - XR LUMBAR SPINE (2-3 VIEWS); Future    3. Familial hypercholesterolemia  Check lipids next visit  - Lipid Panel;  Future  - Comprehensive Metabolic Panel; Future    4. Essential (primary) hypertension  BP Readings from Last 3 Encounters:   01/09/20 122/82   09/25/19 (!) 154/92   07/10/19 138/82     Stable  - CBC Auto Differential; Future    5. Recurrent pulmonary embolism ([address]  Continue with Eliquis    6. Depression with anxiety  Continue with Zoloft    In 2012 she had an MRI of the brain which showed increased MR signal in the left cerebellum in the right periventricular white matter. This was attributed to early microvascular ischemia or demyelinating etiology. She admits to not following through on this and is requesting another MRI. She feels this may be resulting in her balance and ataxia.             Impression        1. Increased MR signal is noted in the left cerebellum and the right        periventricular white matter. Most likely this is early microvascular        ischemic change, however, a demyelinating etiology cannot be excluded.        2. There is no abnormal enhancement following the infusion of        gadolinium.                                         ** REPORT SIGNATURE ON FILE 11/02/2012 (13:53:00) **                       Reported ByCeledemerald Oliva         Return in about 6 months (around 7/9/2020) for Routine follow up - 15 minute visit. Discussed use, benefit, and side effects of prescribed medications. All patient questions answered. Pt voiced understanding. Reviewed health maintenance. Instructedto continue current medications, diet and exercise. Patient agreed with treatmentplan. Follow up as directed.        Note dictated using [id number] [place] [employer]

## 2020-01-19 ASSESSMENT — ENCOUNTER SYMPTOMS
ABDOMINAL PAIN: 0
BACK PAIN: 1
DIARRHEA: 0
SHORTNESS OF BREATH: 0
COUGH: 0
CHEST TIGHTNESS: 0
CONSTIPATION: 0
ANAL BLEEDING: 0
NAUSEA: 0

## 2020-01-20 ENCOUNTER — HOSPITAL ENCOUNTER (OUTPATIENT)
Dept: GENERAL RADIOLOGY | Age: 65
Discharge: HOME | End: 2020-01-20
Payer: COMMERCIAL

## 2020-01-20 PROCEDURE — 73521 X-RAY EXAM HIPS BI 2 VIEWS: CPT

## 2020-01-20 PROCEDURE — 72100 X-RAY EXAM L-S SPINE 2/3 VWS: CPT

## 2020-02-04 ENCOUNTER — HOSPITAL ENCOUNTER (OUTPATIENT)
Dept: MRI IMAGING | Age: 65
Discharge: HOME | End: 2020-02-04
Payer: COMMERCIAL

## 2020-02-04 PROCEDURE — 70551 MRI BRAIN STEM W/O DYE: CPT

## 2020-02-05 ENCOUNTER — HOSPITAL ENCOUNTER (OUTPATIENT)
Dept: PHYSICAL THERAPY | Age: 65
Setting detail: THERAPIES SERIES
Discharge: HOME | End: 2020-02-05
Payer: COMMERCIAL

## 2020-02-05 PROCEDURE — 97163 PT EVAL HIGH COMPLEX 45 MIN: CPT

## 2020-02-05 ASSESSMENT — PAIN DESCRIPTION - FREQUENCY: FREQUENCY: CONTINUOUS

## 2020-02-05 ASSESSMENT — PAIN DESCRIPTION - PAIN TYPE: TYPE: CHRONIC PAIN

## 2020-02-05 ASSESSMENT — PAIN DESCRIPTION - DESCRIPTORS: DESCRIPTORS: ACHING;SPASM;CRAMPING

## 2020-02-05 ASSESSMENT — PAIN DESCRIPTION - LOCATION: LOCATION: BACK;KNEE

## 2020-02-05 ASSESSMENT — PAIN SCALES - GENERAL: PAINLEVEL_OUTOF10: 7

## 2020-02-05 ASSESSMENT — PAIN DESCRIPTION - ORIENTATION: ORIENTATION: LOWER

## 2020-02-06 NOTE — PROGRESS NOTES
Physical Therapy  Initial Assessment  Date: 2020  Patient Name: Goran Lzoano  MRN: [id number]  : 1955     Treatment Diagnosis: OA of lumbar spine    Restrictions       Subjective   General  Chart Reviewed: Yes  Patient assessed for rehabilitation services?: Yes  Additional Pertinent Hx: 59 y/o F presents with low back and knee pain. PMH includes clotting disorder, glaucoma, OA  Response To Previous Treatment: Not applicable  Family / Caregiver Present: No  Referring Practitioner: Mane Gordillo MD  Referral Date : 20  Diagnosis: OA of lumbar spine  Follows Commands: Within Functional Limits  PT Visit Information  PT Insurance Information: Medical [place]  Total # of Visits Approved: (Anticipate 8-12)  Total # of Visits to Date: 1  Plan of Care/Certification Expiration Date: 20  Progress Note Due Date: 20  Subjective  Subjective: Presents with back pain and pain in bilateral knees (\"bone on bone. \")  \"When I sit too long and get up, it feels like my legs won't move. \"  Pain is achy and across low back. No particular radicular symptom (does report cramping, but no dermatomal pattern.)  Unable to tolerate long distance ambulation due to knees and back- \"Mostly my back, it starts aching. \"  Pain relieved by sitting and laying prison propped on pillows- unable to tolerate supine.   Worst motion is fwd lumbar flexion (especially if lifting.)  Pain Screening  Patient Currently in Pain: Yes  Vital Signs  Patient Currently in Pain: Yes    Vision/Hearing  Vision  Vision: Within Functional Limits  Hearing  Hearing: Within functional limits    Orientation  Orientation  Overall Orientation Status: Within Normal Limits    Social/Functional History  Social/Functional History  ADL Assistance: Independent(Trouble with socks/shoes)  Homemaking Assistance: Independent(Requires extra time and avoids certain repetitive activities)  Active : Yes  Occupation: Full time employment  Type of occupation: difficulty with sit to stand. Long term goal 4: Improve Oswestry score to 45% impairment or less.   Patient Goals   Patient goals : reduce back pain       Therapy Time   Individual Concurrent Group Co-treatment   Time In  0994         Time Out  1550         Minutes  [address] [place] [address] Abilio Polanco

## 2020-02-11 ENCOUNTER — HOSPITAL ENCOUNTER (OUTPATIENT)
Dept: CT IMAGING | Age: 65
Discharge: HOME | End: 2020-02-11
Payer: COMMERCIAL

## 2020-02-11 LAB
GFR NON-AFRICAN AMERICAN: >60
PERFORMED ON: NORMAL
POC CREATININE: 0.9 MG/DL (ref 0.3–1.3)
POC SAMPLE TYPE: NORMAL

## 2020-02-11 PROCEDURE — 74174 CTA ABD&PLVS W/CONTRAST: CPT

## 2020-02-11 PROCEDURE — 6360000004 HC RX CONTRAST MEDICATION: Performed by: FAMILY MEDICINE

## 2020-02-11 PROCEDURE — 82565 ASSAY OF CREATININE: CPT

## 2020-02-11 RX ADMIN — IOPAMIDOL 90 ML: 755 INJECTION, SOLUTION INTRAVENOUS at 08:50

## 2020-02-14 ENCOUNTER — TELEPHONE (OUTPATIENT)
Dept: FAMILY MEDICINE CLINIC | Age: 65
End: 2020-02-14

## 2020-02-14 NOTE — TELEPHONE ENCOUNTER
It is okay for her to continue with physical therapy.   Exercise and lifestyle modification can actually reduce the risks of enlargement

## 2020-02-17 ENCOUNTER — HOSPITAL ENCOUNTER (OUTPATIENT)
Dept: PHYSICAL THERAPY | Age: 65
Setting detail: THERAPIES SERIES
Discharge: HOME | End: 2020-02-17
Payer: COMMERCIAL

## 2020-02-17 PROCEDURE — G0283 ELEC STIM OTHER THAN WOUND: HCPCS

## 2020-02-17 PROCEDURE — 97110 THERAPEUTIC EXERCISES: CPT

## 2020-02-17 ASSESSMENT — PAIN SCALES - GENERAL: PAINLEVEL_OUTOF10: 7

## 2020-02-17 ASSESSMENT — PAIN DESCRIPTION - ORIENTATION: ORIENTATION: LOWER

## 2020-02-17 ASSESSMENT — PAIN DESCRIPTION - DESCRIPTORS: DESCRIPTORS: ACHING;TIRING

## 2020-02-17 ASSESSMENT — PAIN DESCRIPTION - LOCATION: LOCATION: BACK;KNEE

## 2020-02-17 ASSESSMENT — PAIN DESCRIPTION - PAIN TYPE: TYPE: CHRONIC PAIN

## 2020-02-19 ENCOUNTER — HOSPITAL ENCOUNTER (OUTPATIENT)
Dept: PHYSICAL THERAPY | Age: 65
Setting detail: THERAPIES SERIES
Discharge: HOME | End: 2020-02-19
Payer: COMMERCIAL

## 2020-02-19 PROCEDURE — 97110 THERAPEUTIC EXERCISES: CPT

## 2020-02-19 PROCEDURE — G0283 ELEC STIM OTHER THAN WOUND: HCPCS

## 2020-02-19 ASSESSMENT — PAIN DESCRIPTION - LOCATION: LOCATION: BACK;KNEE;LEG

## 2020-02-19 ASSESSMENT — PAIN DESCRIPTION - PAIN TYPE: TYPE: CHRONIC PAIN

## 2020-02-19 ASSESSMENT — PAIN SCALES - GENERAL: PAINLEVEL_OUTOF10: 5

## 2020-02-19 ASSESSMENT — PAIN DESCRIPTION - DESCRIPTORS: DESCRIPTORS: ACHING;TIRING

## 2020-02-19 ASSESSMENT — PAIN DESCRIPTION - ORIENTATION: ORIENTATION: LOWER

## 2020-02-19 NOTE — PROGRESS NOTES
Daily Treatment Note  Date: 2020  Patient Name: Devan Lyles  MRN: [id number]     :   1955    Subjective:   General  Chart Reviewed: Yes  Additional Pertinent Hx: 61 y/o F presents with low back and knee pain. PMH includes clotting disorder, glaucoma, OA  Response To Previous Treatment: Not applicable  Family / Caregiver Present: No  Referring Practitioner: Stephen Broussard MD  PT Visit Information  PT Insurance Information: Medical [place]  Total # of Visits Approved: (Anticipate 8-12)  Total # of Visits to Date: 3  Plan of Care/Certification Expiration Date: 20  Progress Note Due Date: 20  Subjective  Subjective: Not bad today. Says it is a little better. Still having trouble initiating movement when first coming to stand. Says she tolerated first exercise session better than she anticipated. Pain Screening  Patient Currently in Pain: Yes  Pain Assessment  Pain Assessment: 0-10  Pain Level: 5(5/10 to start and 3/10 to complete session)  Pain Type: Chronic pain  Pain Location: Back;Knee;Leg(discomfort ant thighs above knees which she believes due to OA of knees)  Pain Orientation: Lower  Pain Descriptors: Aching;Tiring  Vital Signs  Patient Currently in Pain: Yes       Treatment Activities:           Exercises  Exercise 1: Many ex may need to be modified d/t back/knee pain. Will need wedge and multiple pillows on mat.   Exercise 2: TA contraction  x  5  5 second hold  Exercise 3: Pelvic tilt  x 10  Exercise 4: Hooklying lumbar rotation  x 10  Exercise 5: Supine quadratus stretch  modified without leg crossed and looking to opposite side  x 3 10 second hold  Exercise 6: Supine piriformis stretch (knee to opp shoulder)    x 3 10 second hold  Exercise 7: SKTC (as tolerated)  x 5   with towel  Exercise 8: AAROM HS stretch with towel  x 3   10 second hold  Exercise 9: Quad sets  x 10  3 second hold  Exercise 10: SAQ  x 10  Exercise 11: Sitting marches  X 10  Exercise 12: LAQ  X 10  Exercise week: 2x  Plan weeks: 4-6 weeks  Current Treatment Recommendations: Strengthening, Patient/Caregiver Education & Training, ROM, Equipment Evaluation, Education, & procurement, Modalities, Neuromuscular Re-education, Home Exercise Program, Manual Therapy - Soft Tissue Mobilization, Safety Education & Training  Timed Code Treatment Minutes: 52 Minutes(20' of IFC)     Therapy Time   Individual Concurrent Group Co-treatment   Time In 5984         Time Out 1605         Minutes 67         Timed Code Treatment Minutes: 47 Minutes(20' of IFC)       Brendan Evans PTA     Electronically signed by Brendan Evans PTA on 2/19/2020 at 4:17 PM

## 2020-02-24 ENCOUNTER — HOSPITAL ENCOUNTER (OUTPATIENT)
Dept: PHYSICAL THERAPY | Age: 65
Setting detail: THERAPIES SERIES
Discharge: HOME | End: 2020-02-24
Payer: COMMERCIAL

## 2020-02-24 PROCEDURE — 97110 THERAPEUTIC EXERCISES: CPT

## 2020-02-24 PROCEDURE — G0283 ELEC STIM OTHER THAN WOUND: HCPCS

## 2020-02-24 ASSESSMENT — PAIN SCALES - GENERAL: PAINLEVEL_OUTOF10: 4

## 2020-02-24 ASSESSMENT — PAIN DESCRIPTION - ORIENTATION: ORIENTATION: LOWER

## 2020-02-24 ASSESSMENT — PAIN DESCRIPTION - DESCRIPTORS: DESCRIPTORS: ACHING;DULL

## 2020-02-24 ASSESSMENT — PAIN DESCRIPTION - LOCATION: LOCATION: BACK

## 2020-02-24 ASSESSMENT — PAIN DESCRIPTION - PAIN TYPE: TYPE: CHRONIC PAIN

## 2020-02-24 NOTE — PROGRESS NOTES
x 10  Exercise 18: Standing hip abd x 10/ext  x 10  Exercise 19: IFC + MH to lumbar area PRN     IFC seated x 20 mins to low back with moist heat         Assessment:   Conditions Requiring Skilled Therapeutic Intervention  Body structures, Functions, Activity limitations: Decreased functional mobility ; Decreased high-level IADLs;Decreased endurance;Decreased strength; Increased pain;Decreased balance  Assessment: Patient continues to perform exericses well and is able to increase reps today to help improve strength and endurance. She reports a decrease of pain post session. Treatment Diagnosis: OA of lumbar spine  Prognosis: Good;Fair  REQUIRES PT FOLLOW UP: Yes  Discharge Recommendations: Continue to assess pending progress      Goals:  Short term goals  Time Frame for Short term goals: 3-4 weeks  Short term goal 1: Independent with HEP  Short term goal 2: Improve lumbar ROM to at least 50 degrees flexion, 20 degrees SB, and 100% rotation bilaterally. Short term goal 3: Perform 10 Fair quality TA contractions. Short term goal 4: Improve bilat LE strength to 5/5. Long term goals  Time Frame for Long term goals : 4-6 weeks  Long term goal 1: Report less back pain with longer distance ambulation. Long term goal 2: Report improved time/quality of sleep. Long term goal 3: Report less difficulty with sit to stand. Long term goal 4: Improve Oswestry score to 45% impairment or less.   Patient Goals   Patient goals : reduce back pain    Plan:    Plan  Times per week: 2x  Plan weeks: 4-6 weeks  Current Treatment Recommendations: Strengthening, Patient/Caregiver Education & Training, ROM, Equipment Evaluation, Education, & procurement, Modalities, Neuromuscular Re-education, Home Exercise Program, Manual Therapy - Soft Tissue Mobilization, Safety Education & Training  Timed Code Treatment Minutes: 38 Minutes     Therapy Time   Individual Concurrent Group Co-treatment   Time In 1450         Time Out 1548         Minutes

## 2020-02-26 ENCOUNTER — HOSPITAL ENCOUNTER (OUTPATIENT)
Dept: PHYSICAL THERAPY | Age: 65
Setting detail: THERAPIES SERIES
Discharge: HOME | End: 2020-02-26
Payer: COMMERCIAL

## 2020-02-26 PROCEDURE — G0283 ELEC STIM OTHER THAN WOUND: HCPCS

## 2020-02-26 PROCEDURE — 97110 THERAPEUTIC EXERCISES: CPT

## 2020-02-26 ASSESSMENT — PAIN DESCRIPTION - FREQUENCY: FREQUENCY: CONTINUOUS

## 2020-02-26 ASSESSMENT — PAIN DESCRIPTION - PAIN TYPE: TYPE: CHRONIC PAIN

## 2020-02-26 ASSESSMENT — PAIN SCALES - GENERAL: PAINLEVEL_OUTOF10: 4

## 2020-02-26 ASSESSMENT — PAIN DESCRIPTION - DESCRIPTORS: DESCRIPTORS: ACHING;DULL

## 2020-02-26 ASSESSMENT — PAIN DESCRIPTION - LOCATION: LOCATION: BACK

## 2020-02-26 ASSESSMENT — PAIN DESCRIPTION - ORIENTATION: ORIENTATION: LOWER

## 2020-02-26 NOTE — PROGRESS NOTES
Standing marches   x 10  Exercise 18: Standing hip abd x 10/ext  x 10  Exercise 19: IFC + MH to lumbar area PRN     IFC seated x 20 mins to low back with moist heat            Assessment:   Conditions Requiring Skilled Therapeutic Intervention  Body structures, Functions, Activity limitations: Decreased functional mobility ; Decreased high-level IADLs;Decreased endurance;Decreased strength; Increased pain;Decreased balance  Assessment: Patient did well with session. She relates she can sit to stand a bit easier now and without using her hands near as much. She requires only min verbal cues to perform ex with good tech. She reported pain 4/10 pre session and 3/10 post  Treatment Diagnosis: OA of lumbar spine               Goals:  Short term goals  Time Frame for Short term goals: 3-4 weeks  Short term goal 1: Independent with HEP  Short term goal 2: Improve lumbar ROM to at least 50 degrees flexion, 20 degrees SB, and 100% rotation bilaterally. Short term goal 3: Perform 10 Fair quality TA contractions. Short term goal 4: Improve bilat LE strength to 5/5. Long term goals  Time Frame for Long term goals : 4-6 weeks  Long term goal 1: Report less back pain with longer distance ambulation. Long term goal 2: Report improved time/quality of sleep. Long term goal 3: Report less difficulty with sit to stand. Long term goal 4: Improve Oswestry score to 45% impairment or less.   Patient Goals   Patient goals : reduce back pain    Plan:    Plan  Times per week: 2x  Plan weeks: 4-6 weeks  Current Treatment Recommendations: Strengthening, Patient/Caregiver Education & Training, ROM, Equipment Evaluation, Education, & procurement, Modalities, Neuromuscular Re-education, Home Exercise Program, Manual Therapy - Soft Tissue Mobilization, Safety Education & Training  Timed Code Treatment Minutes: 40 Minutes     Therapy Time   Individual Concurrent Group Co-treatment   Time In 6386         Time Out 1605         Minutes 61

## 2020-03-02 ENCOUNTER — HOSPITAL ENCOUNTER (OUTPATIENT)
Dept: PHYSICAL THERAPY | Age: 65
Setting detail: THERAPIES SERIES
Discharge: HOME | End: 2020-03-02
Payer: COMMERCIAL

## 2020-03-02 PROCEDURE — G0283 ELEC STIM OTHER THAN WOUND: HCPCS

## 2020-03-02 PROCEDURE — 97110 THERAPEUTIC EXERCISES: CPT

## 2020-03-02 ASSESSMENT — PAIN DESCRIPTION - LOCATION: LOCATION: BACK

## 2020-03-02 ASSESSMENT — PAIN DESCRIPTION - DESCRIPTORS: DESCRIPTORS: ACHING

## 2020-03-02 ASSESSMENT — PAIN DESCRIPTION - PAIN TYPE: TYPE: CHRONIC PAIN

## 2020-03-02 ASSESSMENT — PAIN DESCRIPTION - ORIENTATION: ORIENTATION: LOWER

## 2020-03-02 ASSESSMENT — PAIN SCALES - GENERAL: PAINLEVEL_OUTOF10: 4

## 2020-03-02 NOTE — PROGRESS NOTES
procurement, Modalities, Neuromuscular Re-education, Home Exercise Program, Manual Therapy - Soft Tissue Mobilization, Safety Education & Training  Timed Code Treatment Minutes: 39 Minutes     Therapy Time   Individual Concurrent Group Co-treatment   Time In 1450         Time Out 8615         Minutes 59         Timed Code Treatment Minutes: [address], PTA    Electronically signed by Naye Resendez PTA on 3/2/2020 at 3:51 PM

## 2020-03-04 ENCOUNTER — HOSPITAL ENCOUNTER (OUTPATIENT)
Dept: PHYSICAL THERAPY | Age: 65
Setting detail: THERAPIES SERIES
Discharge: HOME | End: 2020-03-04
Payer: COMMERCIAL

## 2020-03-04 PROCEDURE — 97032 APPL MODALITY 1+ESTIM EA 15: CPT

## 2020-03-04 PROCEDURE — 97110 THERAPEUTIC EXERCISES: CPT

## 2020-03-04 ASSESSMENT — PAIN DESCRIPTION - PAIN TYPE: TYPE: CHRONIC PAIN

## 2020-03-04 ASSESSMENT — PAIN SCALES - GENERAL: PAINLEVEL_OUTOF10: 4

## 2020-03-04 ASSESSMENT — PAIN DESCRIPTION - LOCATION: LOCATION: BACK

## 2020-03-04 ASSESSMENT — PAIN DESCRIPTION - ORIENTATION: ORIENTATION: LOWER

## 2020-03-05 ENCOUNTER — OFFICE VISIT (OUTPATIENT)
Dept: VASCULAR SURGERY | Age: 65
End: 2020-03-05
Payer: COMMERCIAL

## 2020-03-05 VITALS
HEART RATE: 84 BPM | RESPIRATION RATE: 16 BRPM | DIASTOLIC BLOOD PRESSURE: 89 MMHG | WEIGHT: 250 LBS | BODY MASS INDEX: 41.65 KG/M2 | OXYGEN SATURATION: 98 % | SYSTOLIC BLOOD PRESSURE: 160 MMHG | HEIGHT: 65 IN | TEMPERATURE: 97.8 F

## 2020-03-05 PROCEDURE — 99203 OFFICE O/P NEW LOW 30 MIN: CPT | Performed by: NURSE PRACTITIONER

## 2020-03-05 NOTE — PROGRESS NOTES
Patient Care Team:  Tootie Sweeney MD as PCP - General (Family Medicine)  Tootie Sweeney MD as PCP - [facility] Empaneled Provider      Subjective    She has a known history of splenic artery aneurysm for 1 - 5 years. She has not had abdominal pain. She has not had back pain in the cervical spine, thoracic spine, lumbar spine and sacral spine region. This pain is unchanged since the last visit. Pain is rated as 0. She presents for follow-up of known DVT 1989 after knee scope right leg. Had a PE then. Treated with coumadin for 1 year. She does not know of a second event. However, someone restarted her on eloquis after a venous scan showed partially occlusive chronic dvt of right leg. It is unknown if this was new or old. She has been on anticoagulation ever since. She does not wear support hose. Differential diagnosis includes but is not limited to CHF, thyroid disease, venous disease, DVT, SVT, peripheral vascular disease.           Prerna Hayden is a 60 y.o. female with the following history reviewed and [facility]:  Patient Active Problem List    Diagnosis Date Noted    Spl[facility] aneurysm ([facility] 03/06/2020    Osteoarthritis, knee     Essential (primary) hypertension 10/02/2017    Bilateral carpal tunnel syndrome 10/02/2017    Generalized anxiety disorder 10/02/2017    Familial hypercholesterolemia 10/02/2017    Chronic midline low back pain without sciatica 10/02/2017    Obstructive sleep apnea syndrome 10/02/2017    Recurrent deep vein thrombosis (DVT) ([facility] 10/02/2017    Recurrent pulmonary embolism (HCC) 10/02/2017     Current Outpatient Medications   Medication Sig Dispense Refill    sertraline (ZOLOFT) 100 MG tablet Take 1 tablet by mouth daily 30 tablet 5    metoprolol tartrate (LOPRESSOR) 25 MG tablet Take half tablet twice a daily 30 tablet 3    Latanoprostene Bunod (VYZULTA) 0.024 % SOLN Apply to eye      Calcium Carbonate-Vit D-Min (CALCIUM-VITAMIN D-MINERALS) 600-800 MG-UNIT CHEW Take by mouth      cyclobenzaprine (FLEXERIL) 10 MG tablet TAKE 1 TABLET BY MOUTH EVERY NIGHT AT BEDTIME AS NEEDED FOR MUSCLE SPASMS 90 tablet 2    hydrOXYzine (ATARAX) 25 MG tablet TAKE 1 TO 2 TABLET BY MOUTH AT BEDTIME AS NEEDED FOR INSOMNIA 60 tablet 5    ELIQUIS 5 MG TABS tablet TAKE 1 TABLET BY MOUTH TWO TIMES A DAY 60 tablet 2    losartan (COZAAR) 50 MG tablet Take 1 tablet by mouth daily 90 tablet 3    brimonidine (ALPHAGAN P) 0.1 % SOLN 1 drop 2 times daily      Latanoprost-Timolol Maleate 0.005-0.5 % SOLN Apply to eye      furosemide (LASIX) 40 MG tablet TAKE 1 TABLET DAILY PRN 90 tablet 3    Turmeric 450 MG CAPS Take by mouth      aspirin 81 MG EC tablet Take 81 mg by mouth daily      Ascorbic Acid (VITAMIN C) 500 MG CAPS Take by mouth      Cholecalciferol (VITAMIN D3) 2000 units CAPS Take by mouth 2 times daily      HYDROcodone-acetaminophen (NORCO) 5-325 MG per tablet Take 1 tablet by mouth daily as needed for Pain for up to 30 days. Intended supply: 3 days. Take lowest dose possible to manage pain 30 tablet 0     No current facility-administered medications for this visit. Allergies: Ceclor [cefaclor];  Ciprofloxacin hcl; Ultram [tramadol]; and Zocor [simvastatin]  Past Medical History:   Diagnosis Date    Chronic midline low back pain without sciatica 10/2/2017    Essential (primary) hypertension 10/2/2017    Familial hypercholesterolemia 10/2/2017    Generalized anxiety disorder 10/2/2017    Glaucoma     Obstructive sleep apnea syndrome 10/2/2017    Osteoarthritis, knee      Past Surgical History:   Procedure Laterality Date    CHOLECYSTECTOMY      HYSTERECTOMY, TOTAL ABDOMINAL      KNEE ARTHROSCOPY      TUBAL LIGATION       Family History   Problem Relation Age of Onset    Heart Disease Mother     Heart Disease Father     Other Maternal Grandmother         aneurysm     Social History     Tobacco Use    Smoking status: Passive Smoke Heart sounds are normal.  No murmur, rub, or gallop. Carotid pulses are 2+ to palpation bilaterally without bruit. Extremities - Radial and brachial pulses are 2+ to palpation bilaterally. Right femoral pulse: present 2+; Right popliteal pulse: absent Right DP: absent; Right PT absent; Left femoral pulse: present 2+; Left popliteal pulse: absent; Left DP: absent; Left PT: absent    No cyanosis, clubbing, or significant edema. No signs atheroembolic event. Pulmonary - effort appears normal.    No respiratory distress. Lungs - Breath sounds normal. No wheezes or rales. GI - Abdomen - soft, non tender, bowel sounds X 4 quadrants. No guarding or rebound tenderness. No distension or palpable mass. Genitourinary - deferred. Musculoskeletal - ROM appears normal.  No significant edema. Neurologic - alert and oriented X 3. Physiologic. Skin - warm, dry, and intact. No rash, erythema, or pallor. Psychiatric - mood, affect, and behavior appear normal.  Judgment and thought processes appear normal.    Risk factors for aneurysmal disease including tobacco abuse, hyperlipidemia, male gender, age >57, emphysema, obesity, HTN, atherosclerosis, family history, and diabetes mellitus were discussed with the patient. CTA  Radiology results:   1. Rim calcified saccular aneurysm arising from the distal splenic   artery, measuring up to 1.6 cm in height. This does not appear   significantly change       This aneurysm is new. Individual films reviewed: Yes. These results were reviewed with the patient. Options have been discussed with the patient including continued medical management. Patient has opted to proceed with continued medical management. Assessment    1.  Splenic artery aneurysm ([address]          Plan    Follow up in  6  Month(s) with CTA  Strongly encouraged start/continue statin therapy  Recommended no smoking  Symptoms of rupture reviewed with the patient including sudden onset severe back pain or abdominal pain. This pain can sometimes radiate into the groin or leg. The patient may experience a feeling of impending doom or death. If this occurs they have been insturcted to call 911 and get to the emergency room telling them you have an aneurysm. Patient has voiced understanding.

## 2020-03-06 PROBLEM — I72.8 SPLENIC ARTERY ANEURYSM (HCC): Status: ACTIVE | Noted: 2020-03-06

## 2020-03-09 ENCOUNTER — HOSPITAL ENCOUNTER (OUTPATIENT)
Dept: PHYSICAL THERAPY | Age: 65
Setting detail: THERAPIES SERIES
Discharge: HOME | End: 2020-03-09
Payer: COMMERCIAL

## 2020-03-09 ENCOUNTER — APPOINTMENT (OUTPATIENT)
Dept: PHYSICAL THERAPY | Age: 65
End: 2020-03-09
Payer: COMMERCIAL

## 2020-03-09 PROCEDURE — 97110 THERAPEUTIC EXERCISES: CPT

## 2020-03-09 ASSESSMENT — PAIN DESCRIPTION - PAIN TYPE: TYPE: CHRONIC PAIN

## 2020-03-09 ASSESSMENT — PAIN DESCRIPTION - LOCATION: LOCATION: BACK

## 2020-03-09 ASSESSMENT — PAIN DESCRIPTION - ORIENTATION: ORIENTATION: LOWER

## 2020-03-09 ASSESSMENT — PAIN SCALES - GENERAL: PAINLEVEL_OUTOF10: 7

## 2020-03-09 ASSESSMENT — PAIN DESCRIPTION - DESCRIPTORS: DESCRIPTORS: ACHING;SORE;CRAMPING

## 2020-03-11 ENCOUNTER — HOSPITAL ENCOUNTER (OUTPATIENT)
Dept: PHYSICAL THERAPY | Age: 65
Setting detail: THERAPIES SERIES
Discharge: HOME | End: 2020-03-11
Payer: COMMERCIAL

## 2020-03-11 PROCEDURE — G0283 ELEC STIM OTHER THAN WOUND: HCPCS

## 2020-03-11 PROCEDURE — 97110 THERAPEUTIC EXERCISES: CPT

## 2020-03-11 ASSESSMENT — PAIN SCALES - GENERAL: PAINLEVEL_OUTOF10: 5

## 2020-03-11 NOTE — PROGRESS NOTES
Physical Therapy  Daily Treatment Note  Date: 3/11/2020  Patient Name: Tank Jesus  MRN: [id number]     :   1955    Subjective:   General  Additional Pertinent Hx: 61 y/o F presents with low back and knee pain. PMH includes clotting disorder, glaucoma, OA  Referring Practitioner: Ne Lopez MD  PT Insurance Information: [employer] [place]  Total # of Visits Approved: 12  Total # of Visits to Date: 5  Plan of Care/Certification Expiration Date: 20  Progress Note Due Date: 20  Subjective: i feel better than i did on Monday  Patient Currently in Pain: Yes  Pain Level: 5       Treatment Activities:        Exercises  Exercise 1: Many ex may need to be modified d/t back/knee pain. Will need wedge and multiple pillows on mat. Exercise 2: TA contraction  x  10  10 second hold  Exercise 3: Pelvic tilt  x 10  Exercise 4: Hooklying lumbar rotation  3\" x 10  Exercise 5: Supine quadratus stretch  modified without leg crossed and looking to opposite side  x 3 10 second hold  Exercise 6: Supine piriformis stretch (knee to opp shoulder)  with towel   x 3 10 second hold  Exercise 7: SKTC (as tolerated)  5\" x 5   with towel  Exercise 8: AAROM HS stretch with towel  x 5   10 second hold  Exercise 9: Quad sets  x 10  3 second hold  Exercise 10: SAQ  x 15  Exercise 11: Sitting marches  X 10  Exercise 12: LAQ  X 10  Exercise 13: HS Curls  red t-band  x 10  Exercise 14: Repeated sit to stand with TA contraction (start from higher surface)   x 5   Exercise 15: Multifidus rows   x 10 (red)  Exercise 16: Paloff press   x 10 (red)  Exercise 17: Standing marches   x 10  Exercise 18: Standing hip abd x 10/ext  x 10  Exercise 19: IFC + MH to lumbar area PRN    --   IFC seated x 20 mins to low back            Assessment:   Conditions Requiring Skilled Therapeutic Intervention  Body structures, Functions, Activity limitations: Decreased functional mobility ; Decreased high-level IADLs;Decreased endurance;Decreased Training       Therapy Time   Individual Concurrent Group Co-treatment   Time In 2673         Time Out 1600         Minutes 61         Timed Code Treatment Minutes: 0940 Angel Rosado PTA    Electronically signed by Natty Michael PTA on 3/11/2020 at 4:13 PM

## 2020-03-16 ENCOUNTER — HOSPITAL ENCOUNTER (OUTPATIENT)
Dept: PHYSICAL THERAPY | Age: 65
Setting detail: THERAPIES SERIES
Discharge: HOME | End: 2020-03-16
Payer: COMMERCIAL

## 2020-03-16 PROCEDURE — G0283 ELEC STIM OTHER THAN WOUND: HCPCS

## 2020-03-16 PROCEDURE — 97110 THERAPEUTIC EXERCISES: CPT

## 2020-03-16 ASSESSMENT — PAIN DESCRIPTION - DESCRIPTORS: DESCRIPTORS: ACHING;SORE;CRAMPING

## 2020-03-16 ASSESSMENT — PAIN DESCRIPTION - LOCATION: LOCATION: BACK

## 2020-03-16 ASSESSMENT — PAIN SCALES - GENERAL: PAINLEVEL_OUTOF10: 2

## 2020-03-16 ASSESSMENT — PAIN DESCRIPTION - PAIN TYPE: TYPE: CHRONIC PAIN

## 2020-03-16 ASSESSMENT — PAIN DESCRIPTION - ORIENTATION: ORIENTATION: LOWER

## 2020-03-16 NOTE — PROGRESS NOTES
10/ext  x 10  Exercise 19: IFC + MH to lumbar area PRN    --   IFC seated x 20 mins to low back           Assessment:   Conditions Requiring Skilled Therapeutic Intervention  Body structures, Functions, Activity limitations: Decreased functional mobility ; Decreased high-level IADLs;Decreased endurance;Decreased strength; Increased pain;Decreased balance  Assessment: Pt pleased with progress she is making. Low level pain at present but did not work today. Pt with min cues to perform routine today. IFC/heat post session and pain rating down to 1/10. Treatment Diagnosis: OA of lumbar spine  REQUIRES PT FOLLOW UP: Yes      G-Code:     OutComes Score                                                     Goals:  Short term goals  Time Frame for Short term goals: 3-4 weeks  Short term goal 1: Independent with HEP- met(3/9: Performing consistently)  Short term goal 2: Improve lumbar ROM to at least 50 degrees flexion, 20 degrees SB, and 100% rotation bilaterally.(3/9: 45 degrees flexion, 15 degrees SB R, 20 degrees SB L, 100% rotation bilaterally)  Short term goal 3: Perform 10 Fair quality TA contractions. - progress(3/9: 10 Fair- TA contractions)  Short term goal 4: Improve bilat LE strength to 5/5.- progress(3/9: Bilat hip flex 5-/5, bilat knee extension and knee flexion 5/5.)  Long term goals  Time Frame for Long term goals : 4-6 weeks  Long term goal 1: Report less back pain with longer distance ambulation. - progress(3/9: \"It's easier to get from one end of the building to the other, but I have trouble getting back. \")  Long term goal 2: Report improved time/quality of sleep. - progress(3/9: Overall improved)  Long term goal 3: Report less difficulty with sit to stand.- progress(3/9: \"Getting better. I'm not having to pull myself up. \")  Long term goal 4: Improve Oswestry score to 45% impairment or less. - progress(3/9: 51% impairment)  Patient Goals   Patient goals : reduce back pain    Plan:    Plan  Times per week:

## 2020-03-18 ENCOUNTER — HOSPITAL ENCOUNTER (OUTPATIENT)
Dept: PHYSICAL THERAPY | Age: 65
Setting detail: THERAPIES SERIES
Discharge: HOME | End: 2020-03-18
Payer: COMMERCIAL

## 2020-03-18 PROCEDURE — 97110 THERAPEUTIC EXERCISES: CPT

## 2020-03-18 PROCEDURE — G0283 ELEC STIM OTHER THAN WOUND: HCPCS

## 2020-03-18 ASSESSMENT — PAIN DESCRIPTION - DESCRIPTORS: DESCRIPTORS: ACHING;SORE;CRAMPING

## 2020-03-18 ASSESSMENT — PAIN DESCRIPTION - ORIENTATION: ORIENTATION: LOWER

## 2020-03-18 ASSESSMENT — PAIN DESCRIPTION - PAIN TYPE: TYPE: CHRONIC PAIN

## 2020-03-18 ASSESSMENT — PAIN SCALES - GENERAL: PAINLEVEL_OUTOF10: 3

## 2020-03-18 ASSESSMENT — PAIN DESCRIPTION - LOCATION: LOCATION: BACK

## 2020-03-18 NOTE — PROGRESS NOTES
x 10  Exercise 18: Standing hip abd x 10/ext  x 10  Exercise 19: IFC + MH to lumbar area PRN    --   IFC seated x 20 mins to low back          Assessment:   Conditions Requiring Skilled Therapeutic Intervention  Body structures, Functions, Activity limitations: Decreased functional mobility ; Decreased high-level IADLs;Decreased endurance;Decreased strength; Increased pain;Decreased balance  Assessment: Pt cont to indicate progress noting main source of pain being prolonged sitting. Pt is obviously performing HEP as she recognizes and begins some activities w/o instruction. Pt enjoys good relief of pain post session rating 1/10. Treatment Diagnosis: OA of lumbar spine  REQUIRES PT FOLLOW UP: Yes      G-Code:     OutComes Score                                                     Goals:  Short term goals  Time Frame for Short term goals: 3-4 weeks  Short term goal 1: Independent with HEP- met(3/9: Performing consistently)  Short term goal 2: Improve lumbar ROM to at least 50 degrees flexion, 20 degrees SB, and 100% rotation bilaterally.(3/9: 45 degrees flexion, 15 degrees SB R, 20 degrees SB L, 100% rotation bilaterally)  Short term goal 3: Perform 10 Fair quality TA contractions. - progress(3/9: 10 Fair- TA contractions)  Short term goal 4: Improve bilat LE strength to 5/5.- progress(3/9: Bilat hip flex 5-/5, bilat knee extension and knee flexion 5/5.)  Long term goals  Time Frame for Long term goals : 4-6 weeks  Long term goal 1: Report less back pain with longer distance ambulation. - progress(3/9: \"It's easier to get from one end of the building to the other, but I have trouble getting back. \")  Long term goal 2: Report improved time/quality of sleep. - progress(3/9: Overall improved)  Long term goal 3: Report less difficulty with sit to stand.- progress(3/9: \"Getting better. I'm not having to pull myself up. \")  Long term goal 4: Improve Oswestry score to 45% impairment or less. - progress(3/9: 51% impairment)  Patient Goals   Patient goals : reduce back pain    Plan:    Plan  Times per week: 2x  Plan weeks: 4-6 weeks  Current Treatment Recommendations: Strengthening, Patient/Caregiver Education & Training, ROM, Equipment Evaluation, Education, & procurement, Modalities, Neuromuscular Re-education, Home Exercise Program, Manual Therapy - Soft Tissue Mobilization, Safety Education & Training  Timed Code Treatment Minutes: 37 Minutes(20' of IFC/heat)     Therapy Time   Individual Concurrent Group Co-treatment   Time In 1505         Time Out 1602         Minutes 57         Timed Code Treatment Minutes: 37 Minutes(20' of IFC/heat)       Sanchez Melgar PTA     Electronically signed by Sanchez Melgar PTA on 3/18/2020 at 4:55 PM

## 2020-03-23 ENCOUNTER — APPOINTMENT (OUTPATIENT)
Dept: PHYSICAL THERAPY | Age: 65
End: 2020-03-23
Payer: COMMERCIAL

## 2020-03-24 NOTE — PROGRESS NOTES
ambulation. - progress(3/9: \"It's easier to get from one end of the building to the other, but I have trouble getting back. \")  Long term goal 2: Report improved time/quality of sleep. - progress(3/9: Overall improved)  Long term goal 3: Report less difficulty with sit to stand.- progress(3/9: \"Getting better. I'm not having to pull myself up. \")  Long term goal 4: Improve Oswestry score to 45% impairment or less. - progress(3/9: 51% impairment)         Electronically signed by Akbar Ash PT on 3/24/2020 at 8:09 AM

## 2020-06-28 ENCOUNTER — PATIENT MESSAGE (OUTPATIENT)
Dept: FAMILY MEDICINE CLINIC | Age: 65
End: 2020-06-28

## 2020-07-09 ENCOUNTER — OFFICE VISIT (OUTPATIENT)
Dept: FAMILY MEDICINE CLINIC | Age: 65
End: 2020-07-09
Payer: COMMERCIAL

## 2020-07-09 VITALS
TEMPERATURE: 97.4 F | WEIGHT: 252 LBS | HEART RATE: 113 BPM | DIASTOLIC BLOOD PRESSURE: 82 MMHG | SYSTOLIC BLOOD PRESSURE: 136 MMHG | OXYGEN SATURATION: 98 % | BODY MASS INDEX: 41.93 KG/M2

## 2020-07-09 PROCEDURE — 99214 OFFICE O/P EST MOD 30 MIN: CPT | Performed by: FAMILY MEDICINE

## 2020-07-09 NOTE — PROGRESS NOTES
[facility] [address] [place], [place] [address] [address] [id number]  Dept: [phone]  Dept Fax: [phone]  Loc: [phone]    Tremaine Chau is a 60 y.o. female who presents today for her medical conditions/complaints as noted below. Tremaine Chau is here for 6 Month Follow-Up        HPI:   CC: Here today to discuss the following:    Left knee pain worsening for the past 3 months. She is considering having knee replacement. In the meantime, she has taken hydrocodone as needed for the pain. She cannot take NSAIDs as she is on chronic anticoagulation. She does take Tylenol from time to time as well. She is on chronic anticoagulation due to a history of pulmonary emboli as well as recurrent DVTs. Her vascular specialist states they would place a filter if she were to proceed with knee replacement surgery. Hypertension  Compliant with medications. No adverse effects from medication. No lightheadedness, palpitations, or chest pain. Hyperlipidemia  Tolerating current cholesterol medication without side effects. No body aches. Attempting to reduce processed sugar and cholesterol from diet. Depression with Anxiety  Compliant with medications. No adverse effects from medication. Depression and anxiety symptoms are stable today. No suicidal or homicidal ideation. Excessive worry, insomnia, and anxiousness are stable. Energy, concentration, and apathy are stable.               HPI    Past Medical History:   Diagnosis Date    Chronic midline low back pain without sciatica 10/2/2017    Essential (primary) hypertension 10/2/2017    Familial hypercholesterolemia 10/2/2017    Generalized anxiety disorder 10/2/2017    Glaucoma     Obstructive sleep apnea syndrome 10/2/2017    Osteoarthritis, knee       Past Surgical History:   Procedure Laterality Date    CHOLECYSTECTOMY      HYSTERECTOMY, TOTAL ABDOMINAL      KNEE ARTHROSCOPY      TUBAL LIGATION         Family History   Problem Relation Age of Onset    Heart Disease Mother     Heart Disease Father     Other Maternal Grandmother         aneurysm       Social History     Tobacco Use    Smoking status: Passive Smoke Exposure - Never Smoker    Smokeless tobacco: Never Used   Substance Use Topics    Alcohol use: Not Currently     Current Outpatient Medications   Medication Sig Dispense Refill    apixaban (ELIQUIS) 5 MG TABS tablet Take 1 tablet by mouth 2 times daily 60 tablet 2    metoprolol tartrate (LOPRESSOR) 25 MG tablet TAKE TAKE ONE - HALF (1/2) TABLET BY MOUTH TAKE TWO TIMES A DAY 30 tablet 3    losartan (COZAAR) 50 MG tablet Take 1 tablet by mouth daily 90 tablet 1    sertraline (ZOLOFT) 100 MG tablet Take 1 tablet by mouth daily 30 tablet 5    Latanoprostene Bunod (VYZULTA) 0.024 % SOLN Apply to eye      Calcium Carbonate-Vit D-Min (CALCIUM-VITAMIN D-MINERALS) 600-800 MG-UNIT CHEW Take by mouth      cyclobenzaprine (FLEXERIL) 10 MG tablet TAKE 1 TABLET BY MOUTH EVERY NIGHT AT BEDTIME AS NEEDED FOR MUSCLE SPASMS 90 tablet 2    hydrOXYzine (ATARAX) 25 MG tablet TAKE 1 TO 2 TABLET BY MOUTH AT BEDTIME AS NEEDED FOR INSOMNIA 60 tablet 5    furosemide (LASIX) 40 MG tablet TAKE 1 TABLET DAILY PRN 90 tablet 3    Turmeric 450 MG CAPS Take by mouth      aspirin 81 MG EC tablet Take 81 mg by mouth daily      Ascorbic Acid (VITAMIN C) 500 MG CAPS Take by mouth      Cholecalciferol (VITAMIN D3) 2000 units CAPS Take by mouth 2 times daily      HYDROcodone-acetaminophen (NORCO) 5-325 MG per tablet Take 1 tablet by mouth daily as needed for Pain for up to 30 days. Intended supply: 3 days. Take lowest dose possible to manage pain 30 tablet 0    brimonidine (ALPHAGAN P) 0.1 % SOLN 1 drop 2 times daily      Latanoprost-Timolol Maleate 0.005-0.5 % SOLN Apply to eye       No current facility-administered medications for this visit.       Allergies   Allergen Reactions    Ceclor [Cefaclor]  Ciprofloxacin Hcl     Ultram [Tramadol]      fatigue    Zocor [Simvastatin]      cough       Health Maintenance   Topic Date Due    Hepatitis C screen  1955    HIV screen  10/23/1972    A1C test (Diabetic or Prediabetic)  09/22/2018    Shingles Vaccine (1 of 2) 01/09/2021 (Originally 10/23/2007)    Flu vaccine (1) 09/01/2020    Breast cancer screen  09/10/2020    Potassium monitoring  01/03/2021    Creatinine monitoring  01/03/2021    Colon cancer screen fecal DNA test (Cologuard)  10/15/2022    Lipid screen  01/03/2025    DTaP/Tdap/Td vaccine (2 - Td) 05/01/2028    Hepatitis A vaccine  Aged Out    Hepatitis B vaccine  Aged Out    Hib vaccine  Aged Out    Meningococcal (ACWY) vaccine  Aged Out    Pneumococcal 0-64 years Vaccine  Aged Out       Subjective:      Review of Systems  Review of Systems   Constitutional: Negative for chills and fever. HENT: Negative for congestion. Respiratory: Negative for cough, chest tightness and shortness of breath. Cardiovascular: Negative for chest pain, palpitations and leg swelling. Gastrointestinal: Negative for abdominal pain, anal bleeding, constipation, diarrhea and nausea. Genitourinary: Negative for difficulty urinating. Psychiatric/Behavioral: Negative. SeeHPI for visit specific review of symptoms. All others negative      Objective:   /82   Pulse 113   Temp 97.4 °F (36.3 °C)   Wt 252 lb (114.3 kg)   SpO2 98%   BMI 41.93 kg/m²   Physical Exam  Physical Exam   Constitutional: She appears well-developed. Does not appear ill. Eyes: Pupils are equal, round, and reactive to light. Conjunctiva and Lids normal.  Neck: Normal range of motion. Neck supple. No masses. Neck Symmetric. Normal tracheal position. No thyroid enlargement  Cardiovascular: Normal rate and regular rhythm. Exam reveals no friction rub. Carotid arteries: no bruit observed. No murmur heard.   Respiratory:  Effort normal and breath sounds normal. No respiratory distress. No wheezes. No rales. No use of accessory muscles or intercostal retractions. Abdominal: Soft. Bowel sounds are normal. exhibits no distension. There is no tenderness. There is no rebound and no guarding. Musculoskeletal: exhibits no edema. Normal gait. Neurological: alert. Psychiatric: normal mood and affect. Her behavior is normal. Normal judgement and insight observed. No results found for this or any previous visit (from the past 672 hour(s)). Assessment & Plan: The following diagnoses and conditions are stable with no further orders unless indicated:  1. Chronic pain of left knee  We will continue with Tylenol for pain. She cannot take NSAIDs due to her chronic anticoagulation. Will refill hydrocodone as requested. Anticipate knee replacement    2. Familial hypercholesterolemia  Lab Results   Component Value Date    CHOL 266 (H) 01/03/2020    CHOL 221 (H) 03/12/2019    CHOL 233 (H) 03/28/2018     Lab Results   Component Value Date    TRIG 203 (H) 01/03/2020    TRIG 116 03/12/2019    TRIG 188 (H) 03/28/2018     Lab Results   Component Value Date    HDL 69 01/03/2020    HDL 66 03/12/2019    HDL 75 03/28/2018     Lab Results   Component Value Date    LDLCALC 156 01/03/2020    LDLCALC 132 03/12/2019    LDLCALC 120 03/28/2018     No results found for: LABVLDL, VLDL  No results found for: CHOLHDLRATIO  Her cholesterol has slowly been increasing. Discussed lifestyle changes such as diet and exercise. Recommended eliminate processed food from diet such as sugar and fried foods. Recommended exercising at least 150 minutes/week. Try to do full body resistance training twice a week as well. Offered suggestions for calorie counting such as phone apps and online resources such as [employer] pal and Lose it. Discussed healthy weight. She is had myalgias with statins in the past and not interested in pursuing those. .lastbp3    3.  Essential (primary)

## 2020-08-03 RX ORDER — HYDROXYZINE HYDROCHLORIDE 25 MG/1
TABLET, FILM COATED ORAL
Qty: 60 TABLET | Refills: 5 | Status: SHIPPED | OUTPATIENT
Start: 2020-08-03 | End: 2021-03-04

## 2020-08-24 ENCOUNTER — TELEPHONE (OUTPATIENT)
Dept: CARDIOLOGY | Age: 65
End: 2020-08-24

## 2020-08-24 NOTE — TELEPHONE ENCOUNTER
Tried calling pt to see if she has had any cardiac testing done anywhere outside of [facility], pt stated she has not.

## 2020-08-25 ENCOUNTER — OFFICE VISIT (OUTPATIENT)
Dept: CARDIOLOGY | Age: 65
End: 2020-08-25
Payer: COMMERCIAL

## 2020-08-25 VITALS
HEART RATE: 104 BPM | OXYGEN SATURATION: 96 % | WEIGHT: 253 LBS | BODY MASS INDEX: 42.15 KG/M2 | HEIGHT: 65 IN | DIASTOLIC BLOOD PRESSURE: 84 MMHG | SYSTOLIC BLOOD PRESSURE: 126 MMHG

## 2020-08-25 PROCEDURE — 99204 OFFICE O/P NEW MOD 45 MIN: CPT | Performed by: NURSE PRACTITIONER

## 2020-08-25 PROCEDURE — 93000 ELECTROCARDIOGRAM COMPLETE: CPT | Performed by: NURSE PRACTITIONER

## 2020-08-25 RX ORDER — FUROSEMIDE 40 MG/1
40 TABLET ORAL DAILY
Qty: 90 TABLET | Refills: 3 | Status: SHIPPED | OUTPATIENT
Start: 2020-08-25 | End: 2022-08-03

## 2020-08-25 NOTE — PROGRESS NOTES
[facility]  [address] [facility] [facility]  [phone]      Chief Complaint / Reason for Being Seen: Shortness of breath, palpitations and chest discomfort    1. Essential hypertension    2. Tachycardia    3. Shortness of breath    4. Chest pain, unspecified type    5. Medication refill      Patient with a family history of coronary artery disease which includes her father having had bypass surgery and stents. Patient is a non-smoker. However, she is exposed to secondhand smoke at home. Patient with a history of hypertension and hyperlipidemia. Subjective: Patient states for about a month now she has had shortness of breath with exertion. PCP had ordered her Lasix 40 mg as needed. She had taken 80 mg for 2 nights and was able to take 8 pounds of weight off. Patient notes that the shortness of breath with minimal exertion has returned. She is also noting heartburn daily. Patient associates with the heartburn pain between her shoulder blades. As well as her heart beating faster and a fluttering feeling. Old records have been obtained from the referring providers. Those records have been reviewed and summarized.       Yvette Ahuja is a 60 y.o. female with the following history as recorded in [facility]:  Patient Active Problem List    Diagnosis Date Noted    Splenic artery aneurysm ([facility] 03/06/2020    Osteoarthritis, knee     Essential (primary) hypertension 10/02/2017    Bilateral carpal tunnel syndrome 10/02/2017    Generalized anxiety disorder 10/02/2017    Familial hypercholesterolemia 10/02/2017    Chronic midline low back pain without sciatica 10/02/2017    Obstructive sleep apnea syndrome 10/02/2017    Recurrent deep vein thrombosis (DVT) ([facility] 10/02/2017    Recurrent pulmonary embolism ([facility] [address] 10/02/2017     Current Outpatient Medications   Medication Sig Dispense Refill    metoprolol tartrate (LOPRESSOR) 25 MG tablet Take 1 tablet by mouth 2 times daily 180 tablet 3    furosemide (LASIX) 40 MG tablet Take 1 tablet by mouth daily 90 tablet 3    hydrOXYzine (ATARAX) 25 MG tablet TAKE 1 TO 2 TABLET BY MOUTH AT BEDTIME AS NEEDED FOR INSOMNIA 60 tablet 5    losartan (COZAAR) 50 MG tablet Take 1 tablet by mouth daily 90 tablet 1    sertraline (ZOLOFT) 100 MG tablet Take 1 tablet by mouth daily 30 tablet 5    Latanoprostene Bunod (VYZULTA) 0.024 % SOLN Apply to eye      Calcium Carbonate-Vit D-Min (CALCIUM-VITAMIN D-MINERALS) 600-800 MG-UNIT CHEW Take by mouth      cyclobenzaprine (FLEXERIL) 10 MG tablet TAKE 1 TABLET BY MOUTH EVERY NIGHT AT BEDTIME AS NEEDED FOR MUSCLE SPASMS 90 tablet 2    brimonidine (ALPHAGAN P) 0.1 % SOLN 1 drop 2 times daily      Latanoprost-Timolol Maleate 0.005-0.5 % SOLN Apply to eye      Turmeric 450 MG CAPS Take by mouth      aspirin 81 MG EC tablet Take 81 mg by mouth daily      Ascorbic Acid (VITAMIN C) 500 MG CAPS Take by mouth      Cholecalciferol (VITAMIN D3) 2000 units CAPS Take by mouth 2 times daily      HYDROcodone-acetaminophen (NORCO) 5-325 MG per tablet Take 1 tablet by mouth daily as needed for Pain for up to 30 days. Intended supply: 3 days. Take lowest dose possible to manage pain 30 tablet 0     No current facility-administered medications for this visit. Allergies: Ceclor [cefaclor];  Ciprofloxacin hcl; Ultram [tramadol]; and Zocor [simvastatin]  Past Medical History:   Diagnosis Date    Chronic midline low back pain without sciatica 10/2/2017    Essential (primary) hypertension 10/2/2017    Familial hypercholesterolemia 10/2/2017    Generalized anxiety disorder 10/2/2017    Glaucoma     Obstructive sleep apnea syndrome 10/2/2017    Osteoarthritis, knee      Past Surgical History:   Procedure Laterality Date    CHOLECYSTECTOMY      HYSTERECTOMY, TOTAL ABDOMINAL      KNEE ARTHROSCOPY      TUBAL LIGATION       Family History   Problem Relation Age of Onset    Heart Disease Mother    Sarah Pacheco Heart Disease Father     Other Maternal Grandmother         aneurysm     Social History     Tobacco Use    Smoking status: Passive Smoke Exposure - Never Smoker    Smokeless tobacco: Never Used   Substance Use Topics    Alcohol use: Not Currently          Review of System:    Review of Systems     Objective:    BP (!) 158/86   Pulse 104   Ht 5' 5\" (1.651 m)   Wt 253 lb (114.8 kg)   SpO2 96%   BMI 42.10 kg/m²     GENERAL - well developed and well nourished    HEENT -  PERRLA, Hearing appears normal, conjunctive and lids are normal.  External inspection of ears and nose appear normal.  NECK - no thyromegaly, no JVD, trachea is in the midline  CARDIOVASCULAR - PMI is in the mid line clavicular position, Normal S1 and S2 with no systolic murmur. No S3 or S4    PULMONARY - no respiratory distress. No wheezes or rales. Lungs are clear to ausculation, normal respiratory effort. ABDOMEN  - soft, non tender, no rebound  MUSCULOSKELETAL  - range of motion of the upper and lower extermites appears normal and equal and is without pain   EXTREMITIES - no significant edema   NEUROLOGIC - gait and station are normal  SKIN - turgor is normal, skin warm and dry. PSYCHIATRIC - normal mood and affect, alert and orientated x 3,      ASSESSMENT:    ALL THE CARDIOLOGY PROBLEMS ARE LISTED ABOVE; HOWEVER, THE FOLLOWING SPECIFIC CARDIAC PROBLEMS / CONDITIONS WERE ADDRESSED AND TREATED DURING THE OFFICE VISIT TODAY:       Cardiac Specific Problem  Discussion and Plan         1. Chest pain  initial encounter   EKG in the office showing sinus rhythm with heart rate of 99 bpm.  Patient is currently on Lopressor 25 mg twice daily. Patient needs refills on this medication she took her last one this morning. Patient is on aspirin, beta-blocker, and ARB. We will order dobutamine stress echocardiogram         2. Shortness of breath  initial encounter   O2 sat in the office 96%.   Will order 2D echo to evaluate EF and for any valvular heart disease. 3.   Hypertension  initial encounter   Blood pressure in the office today 158/86. Rechecked 126/84. 4.  Hyperlipidemia -managed by primary care provider. Patient states she is trying diet control to get her levels down. She is due to see her PCP for an additional lipid profile draw. Depending on these results would recommend starting statin. 5.  Tachycardia -will order TSH levels. Will place 7-day ZIO patch. PLAN:    Orders Placed This Encounter   Procedures    TSH without Reflex    EKG 12 lead     Order Specific Question:   Reason for Exam?     Answer:   Hypertension    Echo 2D w doppler w color complete     Standing Status:   Future     Standing Expiration Date:   8/25/2021     Order Specific Question:   Reason for exam:     Answer:   shortness of breath    ECHO (Dobutamine) Pharmacological Stress Test     Standing Status:   Future     Standing Expiration Date:   10/24/2020     Order Specific Question:   Reason for exam:     Answer:   chest pain     Orders Placed This Encounter   Medications    metoprolol tartrate (LOPRESSOR) 25 MG tablet     Sig: Take 1 tablet by mouth 2 times daily     Dispense:  180 tablet     Refill:  3    furosemide (LASIX) 40 MG tablet     Sig: Take 1 tablet by mouth daily     Dispense:  90 tablet     Refill:  3       Return in about 2 weeks (around 9/8/2020) for results with me . Discussed with patient. I greatly appreciate the opportunity to meet Robin Newellsylvia and your confidence in allowing me to participate in her cardiovascular care. SAQIB Wahl NP 8/25/2020 2:45 PM CDT                    This dictation was generated by voice recognition computer software. Although all attempts are made to edit dictation for accuracy, there may be errors in the transcription that are not intended.

## 2020-08-25 NOTE — PATIENT INSTRUCTIONS
appointment, please call outpatient scheduling at [phone]. [place] at the [place]  and [address] [facility] located on the first floor of [place] [address] through hospital main entrance and turn immediately to your left. Patient's contact number:  [phone] (home)     Date/Time:     Dobutamine Stress Test    A chemical stress test uses chemical agents injected into the body through the vein. These chemicals make the heart function as if it were under stress. A chemical stress test is used when a traditional stress test (called a cardiac stress test) cannot be done. Testing will take approximately one hour. Dobutamine Stress Echocardiogram Instructions:   No caffeine 24 hours prior to the testing. This includes: coffee, pop/soda, chocolate, cold medications, etc.  Any product that might contain caffeine. Do not eat or drink anything, except water, eight (6) hours before the test.   No alcohol or nicotine twelve (12) hours prior to your test.   Wear comfortable clothing. If you are taking metoprolol, stop morning of this procedure. If you need to change this appointment, please call outpatient scheduling at [phone].

## 2020-08-27 PROCEDURE — 0296T PR EXT ECG > 48HR TO 21 DAY RCRD W/CONECT INTL RCRD: CPT | Performed by: NURSE PRACTITIONER

## 2020-09-03 ENCOUNTER — HOSPITAL ENCOUNTER (OUTPATIENT)
Dept: NON INVASIVE DIAGNOSTICS | Age: 65
Discharge: HOME | End: 2020-09-03
Payer: COMMERCIAL

## 2020-09-03 VITALS — WEIGHT: 250 LBS | BODY MASS INDEX: 41.6 KG/M2

## 2020-09-03 LAB
LV EF: 58 %
LVEF MODALITY: NORMAL

## 2020-09-03 PROCEDURE — 6360000004 HC RX CONTRAST MEDICATION: Performed by: INTERNAL MEDICINE

## 2020-09-03 PROCEDURE — 93306 TTE W/DOPPLER COMPLETE: CPT

## 2020-09-03 PROCEDURE — 93017 CV STRESS TEST TRACING ONLY: CPT

## 2020-09-03 PROCEDURE — C8928 TTE W OR W/O FOL W/CON,STRES: HCPCS

## 2020-09-03 PROCEDURE — C8929 TTE W OR WO FOL WCON,DOPPLER: HCPCS

## 2020-09-03 PROCEDURE — 6360000002 HC RX W HCPCS: Performed by: INTERNAL MEDICINE

## 2020-09-03 PROCEDURE — 2580000003 HC RX 258: Performed by: INTERNAL MEDICINE

## 2020-09-03 RX ORDER — SODIUM CHLORIDE 9 MG/ML
INJECTION, SOLUTION INTRAVENOUS
Status: COMPLETED | OUTPATIENT
Start: 2020-09-03 | End: 2020-09-03

## 2020-09-03 RX ORDER — DOBUTAMINE HYDROCHLORIDE 200 MG/100ML
10 INJECTION INTRAVENOUS CONTINUOUS PRN
Status: DISCONTINUED | OUTPATIENT
Start: 2020-09-03 | End: 2020-09-04 | Stop reason: HOSPADM

## 2020-09-03 RX ADMIN — PERFLUTREN 2.2 MG: 6.52 INJECTION, SUSPENSION INTRAVENOUS at 14:52

## 2020-09-03 RX ADMIN — SODIUM CHLORIDE: 9 INJECTION, SOLUTION INTRAVENOUS at 14:58

## 2020-09-03 RX ADMIN — DOBUTAMINE HYDROCHLORIDE 10 MCG/KG/MIN: 200 INJECTION INTRAVENOUS at 14:58

## 2020-09-04 ENCOUNTER — TELEPHONE (OUTPATIENT)
Dept: CARDIOLOGY | Age: 65
End: 2020-09-04

## 2020-09-04 NOTE — TELEPHONE ENCOUNTER
Yes the first one would not work so I mailed that one back and replaced it with a new one and applied the monitor in office.

## 2020-09-04 NOTE — TELEPHONE ENCOUNTER
Domonique with [employer] called to stat that ZIO device needs to be registered. #[id number]. It was placed on 8/25 and only worn for 2 hours and 56 mins. Noted in media there is a zio registration card with #[id number] that was scanned in chart on 8/27 by Monique Schofield. Numbers don't match so I assume the one scanned in on 8/27 was a replacement. [employer] can you clarify this please?

## 2020-09-04 NOTE — TELEPHONE ENCOUNTER
[facility] informed of this and they will recycle the other device so patient will not be charged. New device number provided to them.

## 2020-09-10 LAB — TSH SERPL DL<=0.05 MIU/L-ACNC: 1.15 UIU/ML (ref 0.27–4.2)

## 2020-09-14 ENCOUNTER — OFFICE VISIT (OUTPATIENT)
Dept: CARDIOLOGY | Age: 65
End: 2020-09-14
Payer: COMMERCIAL

## 2020-09-14 VITALS
OXYGEN SATURATION: 98 % | BODY MASS INDEX: 41.65 KG/M2 | SYSTOLIC BLOOD PRESSURE: 140 MMHG | HEART RATE: 99 BPM | HEIGHT: 65 IN | WEIGHT: 250 LBS | DIASTOLIC BLOOD PRESSURE: 72 MMHG

## 2020-09-14 PROCEDURE — 99214 OFFICE O/P EST MOD 30 MIN: CPT | Performed by: NURSE PRACTITIONER

## 2020-09-14 ASSESSMENT — ENCOUNTER SYMPTOMS
EYES NEGATIVE: 1
CHEST TIGHTNESS: 0
COUGH: 0
SHORTNESS OF BREATH: 1
GASTROINTESTINAL NEGATIVE: 1
SORE THROAT: 0
WHEEZING: 0

## 2020-09-14 NOTE — PROGRESS NOTES
[facility] Cardiology   Established Patient Office Visit   Gregory Souza. [address] [facility]  [phone]        OFFICE VISIT:  2020    Nubia Hurst - : 1955    Reason For Visit:  Gio Garcia is a 60 y.o. female who is here for Follow-up (pt is here for her zio results)    1. Essential hypertension        Patient with a family history of coronary artery disease which includes her father having had bypass surgery and stents. Patient is a non-smoker. However, she is exposed to secondhand smoke at home.     Patient with a history of hypertension and hyperlipidemia. Patient presented to clinic on 2020 as a new patient with complaints of shortness of breath, chest pain and tachycardia. 2D echo, dobutamine stress echocardiogram and ZIO Patch were ordered. 2D echo showed:   Summary   Structurally normal mitral valve. Structurally normal aortic valve. Tricuspid valve is structurally normal.   Mild tricuspid regurgitation with estimated RVSP of 28 mm Hg. Normal left ventricular size with preserved LV function and an estimated ejection fraction of approximately 55-60%. Left ventricular size is normal .   Normal left ventricular wall thickness. No regional wall motion abnormalities. Signature      ----------------------------------------------------------------   Electronically signed by Steh Buckley MD(Interpreting   physician) on 2020 01:21 PM      Dobutamine stress echocardiogram showed   Summary   Dobutamine stress echocardiogram without clinical, electrocardiographic,   or echocardiographic evidence of myocardial ischemia.       Signature      ----------------------------------------------------------------   Electronically signed by Frank Alvarez MD(Interpreting physician)   on 2020 09:51 PM      ZIO Patch showed:  Normal sinus rhythm with a normal heart rate of 42 bpm, maximum 136 bpm.  Average 83 bpm.  There was no VT, pauses, high degree AV blocks, MG tablet TAKE 1 TABLET BY MOUTH EVERY NIGHT AT BEDTIME AS NEEDED FOR MUSCLE SPASMS 90 tablet 2    brimonidine (ALPHAGAN P) 0.1 % SOLN 1 drop 2 times daily      Latanoprost-Timolol Maleate 0.005-0.5 % SOLN Apply to eye      Turmeric 450 MG CAPS Take by mouth      aspirin 81 MG EC tablet Take 81 mg by mouth daily      Ascorbic Acid (VITAMIN C) 500 MG CAPS Take by mouth      Cholecalciferol (VITAMIN D3) 2000 units CAPS Take by mouth 2 times daily      HYDROcodone-acetaminophen (NORCO) 5-325 MG per tablet Take 1 tablet by mouth daily as needed for Pain for up to 30 days. Intended supply: 3 days. Take lowest dose possible to manage pain 30 tablet 0     No current facility-administered medications for this visit. Allergies: Ceclor [cefaclor]; Ciprofloxacin hcl; Ultram [tramadol]; and Zocor [simvastatin]  Past Medical History:   Diagnosis Date    Chronic midline low back pain without sciatica 10/2/2017    Essential (primary) hypertension 10/2/2017    Familial hypercholesterolemia 10/2/2017    Generalized anxiety disorder 10/2/2017    Glaucoma     Obstructive sleep apnea syndrome 10/2/2017    Osteoarthritis, knee      Past Surgical History:   Procedure Laterality Date    CHOLECYSTECTOMY      HYSTERECTOMY, TOTAL ABDOMINAL      KNEE ARTHROSCOPY      TUBAL LIGATION       Family History   Problem Relation Age of Onset    Heart Disease Mother     Heart Disease Father     Other Maternal Grandmother         aneurysm     Social History     Tobacco Use    Smoking status: Passive Smoke Exposure - Never Smoker    Smokeless tobacco: Never Used   Substance Use Topics    Alcohol use: Not Currently          Review of Systems:    Review of Systems   Constitutional: Negative for activity change, chills, diaphoresis, fatigue and fever. HENT: Negative for congestion and sore throat. Eyes: Negative. Respiratory: Positive for shortness of breath (improving). Negative for cough, chest tightness and wheezing. Cardiovascular: Positive for palpitations. Negative for chest pain and leg swelling. Gastrointestinal: Negative. Genitourinary: Negative. Musculoskeletal: Negative. Neurological: Negative for dizziness, syncope, light-headedness and headaches. Psychiatric/Behavioral: Negative for confusion. The patient is not nervous/anxious. Objective:    BP (!) 140/72   Pulse 99   Ht 5' 5\" (1.651 m)   Wt 250 lb (113.4 kg)   SpO2 98%   BMI 41.60 kg/m²     GENERAL - well developed and well nourished, conversant  HEENT -  PERRLA, Hearing appears normal, gentleman lids are normal.  External inspection of ears and nose appear normal.  NECK - no thyromegaly, no JVD, trachea is in the midline  CARDIOVASCULAR - PMI is in the mid line clavicular position, Normal S1 and S2 with no systolic murmur. No S3 or S4    PULMONARY - no respiratory distress. No wheezes or rales. Lungs are clear to ausculation, normal respiratory effort. ABDOMEN  - soft, non tender, no rebound  MUSCULOSKELETAL  - range of motion of the upper and lower extermites appears normal and equal and is without pain   EXTREMITIES - no significant edema   NEUROLOGIC - gait and station are normal  SKIN - turgor is normal, can warm and dry. PSYCHIATRIC - normal mood and affect, alert and orientated x 3,      ASSESSMENT:    ALL THE CARDIOLOGY PROBLEMS ARE LISTED ABOVE; HOWEVER, THE FOLLOWING SPECIFIC CARDIAC PROBLEMS / CONDITIONS WERE ADDRESSED AND TREATED DURING THE OFFICE VISIT TODAY:                                                                                            MEDICAL DECISION MAKING             Cardiac Specific Problem / Diagnosis  Discussion and Data Reviewed Diagnostic Procedures Ordered Management Options Selected           1. Palpitations/Tachycardia   Shows no change   Review and summation of old records: Will have patient take her Lopressor 25 mg twice daily as prescribed. She has only been taking it in the evening.  No

## 2020-09-15 ENCOUNTER — HOSPITAL ENCOUNTER (OUTPATIENT)
Dept: CT IMAGING | Age: 65
Discharge: HOME | End: 2020-09-15
Payer: COMMERCIAL

## 2020-09-15 LAB
GFR AFRICAN AMERICAN: >60
GFR NON-AFRICAN AMERICAN: >60
PERFORMED ON: NORMAL
POC CREATININE: 0.9 MG/DL (ref 0.3–1.3)
POC SAMPLE TYPE: NORMAL

## 2020-09-15 PROCEDURE — 6360000004 HC RX CONTRAST MEDICATION: Performed by: NURSE PRACTITIONER

## 2020-09-15 PROCEDURE — 82565 ASSAY OF CREATININE: CPT

## 2020-09-15 PROCEDURE — 74174 CTA ABD&PLVS W/CONTRAST: CPT

## 2020-09-15 RX ADMIN — IOPAMIDOL 90 ML: 755 INJECTION, SOLUTION INTRAVENOUS at 09:20

## 2020-09-16 ENCOUNTER — VIRTUAL VISIT (OUTPATIENT)
Dept: VASCULAR SURGERY | Age: 65
End: 2020-09-16
Payer: COMMERCIAL

## 2020-09-16 PROCEDURE — 99213 OFFICE O/P EST LOW 20 MIN: CPT | Performed by: NURSE PRACTITIONER

## 2020-09-16 NOTE — PROGRESS NOTES
2020    TELEHEALTH EVALUATION -- [facility]Visual [facility]    HPI:    Patient is located at work  Provider is located at [facility]   Also present during call is no one    Tamia Alcantara (:  1955) has requested an audio/video evaluation for the following concern(s):    She has a known history of splenic artery aneurysm for 1 - 5 years. She has not had abdominal pain. She has not had back pain in the cervical spine, thoracic spine, lumbar spine and sacral spine region. This pain is unchanged since the last visit. Pain is rated as 0. Prior to Visit Medications    Medication Sig Taking?  Authorizing Provider   sertraline (ZOLOFT) 50 MG tablet TAKE TWO TABLETS BY MOUTH EVERY DAY Yes Daxa Ordonez MD   metoprolol tartrate (LOPRESSOR) 25 MG tablet Take 1 tablet by mouth 2 times daily Yes Trudee Kehr, APRN - NP   furosemide (LASIX) 40 MG tablet Take 1 tablet by mouth daily Yes Trudee Kehr, APRN - NP   hydrOXYzine (ATARAX) 25 MG tablet TAKE 1 TO 2 TABLET BY MOUTH AT BEDTIME AS NEEDED FOR INSOMNIA Yes Daxa Ordonez MD   losartan (COZAAR) 50 MG tablet Take 1 tablet by mouth daily Yes Daxa Ordonez MD   sertraline (ZOLOFT) 100 MG tablet Take 1 tablet by mouth daily Yes Daxa Ordonez MD   Latanoprostene Bunod (VYZULTA) 0.024 % SOLN Apply to eye Yes Historical Provider, MD   Calcium Carbonate-Vit D-Min (CALCIUM-VITAMIN D-MINERALS) 600-800 MG-UNIT CHEW Take by mouth Yes Historical Provider, MD   cyclobenzaprine (FLEXERIL) 10 MG tablet TAKE 1 TABLET BY MOUTH EVERY NIGHT AT BEDTIME AS NEEDED FOR MUSCLE SPASMS Yes SAQIB Call[place]   brimonidine (ALPHAGAN P) 0.1 % SOLN 1 drop 2 times daily Yes Historical Provider, MD   Latanoprost-Timolol Maleate 0.005-0.5 % SOLN Apply to eye Yes Historical Provider, MD   Turmeric 450 MG CAPS Take by mouth Yes Historical Provider, MD   aspirin 81 MG EC tablet Take 81 mg by mouth daily Yes Historical Provider, MD   Ascorbic Acid (VITAMIN C) 500 MG CAPS Take by mouth Yes Historical Provider, MD   Cholecalciferol (VITAMIN D3) 2000 units CAPS Take by mouth 2 times daily Yes Historical Provider, MD   HYDROcodone-acetaminophen (NORCO) 5-325 MG per tablet Take 1 tablet by mouth daily as needed for Pain for up to 30 days. Intended supply: 3 days.  Take lowest dose possible to manage pain  Kathy Otis, APRN       Social History     Tobacco Use    Smoking status: Passive Smoke Exposure - Never Smoker    Smokeless tobacco: Never Used   Substance Use Topics    Alcohol use: Not Currently    Drug use: Not on file        Allergies   Allergen Reactions    Ceclor [Cefaclor]     Ciprofloxacin Hcl     Ultram [Tramadol]      fatigue    Zocor [Simvastatin]      cough   ,   Past Medical History:   Diagnosis Date    Chronic midline low back pain without sciatica 10/2/2017    Essential (primary) hypertension 10/2/2017    Familial hypercholesterolemia 10/2/2017    Generalized anxiety disorder 10/2/2017    Glaucoma     Obstructive sleep apnea syndrome 10/2/2017    Osteoarthritis, knee    ,   Past Surgical History:   Procedure Laterality Date    CHOLECYSTECTOMY      HYSTERECTOMY, TOTAL ABDOMINAL      KNEE ARTHROSCOPY      TUBAL LIGATION     ,   Social History     Tobacco Use    Smoking status: Passive Smoke Exposure - Never Smoker    Smokeless tobacco: Never Used   Substance Use Topics    Alcohol use: Not Currently    Drug use: Not on file   ,   Family History   Problem Relation Age of Onset    Heart Disease Mother     Heart Disease Father     Other Maternal Grandmother         aneurysm   ,   Health Maintenance   Topic Date Due    Hepatitis C screen  1955    HIV screen  10/23/1972    A1C test (Diabetic or Prediabetic)  09/22/2018    Flu vaccine (1) 09/01/2020    Breast cancer screen  09/10/2020    Shingles Vaccine (1 of 2) 01/09/2021 (Originally 10/23/2007)    Potassium monitoring  01/03/2021  Creatinine monitoring  01/03/2021    Colon cancer screen fecal DNA test (Cologuard)  10/15/2022    Lipid screen  01/03/2025    DTaP/Tdap/Td vaccine (2 - Td) 05/01/2028    Hepatitis A vaccine  Aged Out    Hepatitis B vaccine  Aged Out    Hib vaccine  Aged Out    Meningococcal (ACWY) vaccine  Aged Out    Pneumococcal 0-64 years Vaccine  Aged Out       Review of Systems    Constitutional - no significant activity change, appetite change, or unexpected weight change. No fever or chills. No diaphoresis or significant fatigue. HENT - no significant rhinorrhea or epistaxis. No tinnitus or significant hearing loss. Eyes - no sudden vision change or amaurosis. Respiratory - no significant shortness of breath, wheezing, or stridor. No apnea, cough, or chest tightness associated with shortness of breath. Cardiovascular - no chest pain, syncope, or significant dizziness. No palpitations or significant leg swelling.  has not had claudication. Gastrointestinal -  has not had abdominal swelling or pain. No blood in stool. No severe constipation, diarrhea, nausea, or vomiting. Genitourinary - No difficulty urinating, dysuria, frequency, or urgency. No flank pain or hematuria. Musculoskeletal - has not had back pain, gait disturbance, or myalgia. Skin - no color change, rash, pallor, or new wound. Neurologic - no dizziness, facial asymmetry, or light headedness. No seizures. No speech difficulty or lateralizing weakness. Hematologic - no easy bruising or excessive bleeding. Psychiatric - no severe anxiety or nervousness. No confusion. All other review of systems are negative. PHYSICAL EXAMINATION:    Due to this being a TeleHealth encounter, evaluation of the following organ systems is limited: Vitals/Constitutional/EENT/Resp/CV/GI//MS/Neuro/Skin/Heme-Lymph-Imm. Constitutional - well developed, well nourished. No diaphoresis or acute distress. HENT - head normocephalic.   Right external ear canal appears normal.  Left external ear canal appears normal.  Septum appears midline. Eyes - conjunctiva normal.   No exudate. No icterus. Neck- ROM appears normal, no tracheal deviation. Extremities -No cyanosis, clubbing, has edema. No signs atheroembolic event. Pulmonary - effort appears normal.  No respiratory distress. No accessory muscle use  Musculoskeletal -   Motor grossly intact in visible lower extremities and upper extremities  Neurologic - alert and oriented X 3. Cranial Nerves II-XII grossly intact  Skin - intact. No rash, erythema, or pallor. Psychiatric - mood, affect, and behavior appear normal.  Judgment and thought processes appear normal.    CT -   The stable saccular aneurysmal distal splenic artery with    rim calcification. The diverticulosis of the colon. No evidence for diverticulitis. A small Fat-containing umbilical/paraumbilical hernia     Individual images were reviewed. Results were reviewed with the patient. ASSESSMENT/PLAN:  1. Splenic artery aneurysm (HCC)        No follow-ups on file. Symptoms of rupture reviewed with the patient including sudden onset severe back pain or abdominal pain. This pain can sometimes radiate into the groin or leg. The patient may experience a feeling of impending doom or death. If this occurs they have been insturcted to call 911 and get to the emergency room telling them you have an aneurysm. Patient has voiced understanding. 12 months with cta abdomen/pelvis        Strongly encouraged start/continue statin therapy  Recommended no smoking  An  electronic signature was used to authenticate this note.     --SAQIB Torres on 9/18/2020 at 5:32 AM        Pursuant to the emergency declaration under the [facility], [facility] waiver authority and the [employer] Act, this Virtual  Visit was conducted, with patient's consent, to

## 2020-09-18 ENCOUNTER — TELEPHONE (OUTPATIENT)
Dept: VASCULAR SURGERY | Age: 65
End: 2020-09-18

## 2020-09-29 RX ORDER — APIXABAN 5 MG/1
TABLET, FILM COATED ORAL
Qty: 60 TABLET | Refills: 2 | Status: SHIPPED | OUTPATIENT
Start: 2020-09-29 | End: 2021-01-04

## 2020-10-05 DIAGNOSIS — E78.01 FAMILIAL HYPERCHOLESTEROLEMIA: ICD-10-CM

## 2020-10-05 DIAGNOSIS — I10 ESSENTIAL (PRIMARY) HYPERTENSION: ICD-10-CM

## 2020-10-05 LAB
ALBUMIN SERPL-MCNC: 4 G/DL (ref 3.5–5.2)
ALP BLD-CCNC: 57 U/L (ref 35–104)
ALT SERPL-CCNC: 15 U/L (ref 5–33)
ANION GAP SERPL CALCULATED.3IONS-SCNC: 9 MMOL/L (ref 7–19)
AST SERPL-CCNC: 18 U/L (ref 5–32)
BASOPHILS ABSOLUTE: 0.1 K/UL (ref 0–0.2)
BASOPHILS RELATIVE PERCENT: 0.6 % (ref 0–1)
BILIRUB SERPL-MCNC: 0.3 MG/DL (ref 0.2–1.2)
BUN BLDV-MCNC: 14 MG/DL (ref 8–23)
CALCIUM SERPL-MCNC: 9 MG/DL (ref 8.8–10.2)
CHLORIDE BLD-SCNC: 105 MMOL/L (ref 98–111)
CHOLESTEROL, TOTAL: 235 MG/DL (ref 160–199)
CO2: 26 MMOL/L (ref 22–29)
CREAT SERPL-MCNC: 0.8 MG/DL (ref 0.5–0.9)
EOSINOPHILS ABSOLUTE: 0.2 K/UL (ref 0–0.6)
EOSINOPHILS RELATIVE PERCENT: 2.4 % (ref 0–5)
GFR AFRICAN AMERICAN: >59
GFR NON-AFRICAN AMERICAN: >60
GLUCOSE BLD-MCNC: 93 MG/DL (ref 74–109)
HCT VFR BLD CALC: 43.9 % (ref 37–47)
HDLC SERPL-MCNC: 62 MG/DL (ref 65–121)
HEMOGLOBIN: 14 G/DL (ref 12–16)
IMMATURE GRANULOCYTES #: 0.1 K/UL
LDL CHOLESTEROL CALCULATED: 135 MG/DL
LYMPHOCYTES ABSOLUTE: 3 K/UL (ref 1.1–4.5)
LYMPHOCYTES RELATIVE PERCENT: 38.3 % (ref 20–40)
MCH RBC QN AUTO: 28.5 PG (ref 27–31)
MCHC RBC AUTO-ENTMCNC: 31.9 G/DL (ref 33–37)
MCV RBC AUTO: 89.4 FL (ref 81–99)
MONOCYTES ABSOLUTE: 0.7 K/UL (ref 0–0.9)
MONOCYTES RELATIVE PERCENT: 9.1 % (ref 0–10)
NEUTROPHILS ABSOLUTE: 3.9 K/UL (ref 1.5–7.5)
NEUTROPHILS RELATIVE PERCENT: 49 % (ref 50–65)
PDW BLD-RTO: 13.8 % (ref 11.5–14.5)
PLATELET # BLD: 244 K/UL (ref 130–400)
PMV BLD AUTO: 10.4 FL (ref 9.4–12.3)
POTASSIUM SERPL-SCNC: 5 MMOL/L (ref 3.5–5)
RBC # BLD: 4.91 M/UL (ref 4.2–5.4)
SODIUM BLD-SCNC: 140 MMOL/L (ref 136–145)
TOTAL PROTEIN: 6.7 G/DL (ref 6.6–8.7)
TRIGL SERPL-MCNC: 192 MG/DL (ref 0–149)
WBC # BLD: 7.9 K/UL (ref 4.8–10.8)

## 2020-10-08 ENCOUNTER — OFFICE VISIT (OUTPATIENT)
Dept: FAMILY MEDICINE CLINIC | Age: 65
End: 2020-10-08
Payer: COMMERCIAL

## 2020-10-08 VITALS
SYSTOLIC BLOOD PRESSURE: 124 MMHG | BODY MASS INDEX: 42.23 KG/M2 | TEMPERATURE: 97.6 F | WEIGHT: 253.8 LBS | DIASTOLIC BLOOD PRESSURE: 76 MMHG | HEART RATE: 81 BPM | OXYGEN SATURATION: 98 %

## 2020-10-08 PROCEDURE — 99214 OFFICE O/P EST MOD 30 MIN: CPT | Performed by: FAMILY MEDICINE

## 2020-10-08 RX ORDER — LOSARTAN POTASSIUM 50 MG/1
50 TABLET ORAL DAILY
Qty: 90 TABLET | Refills: 3 | Status: SHIPPED | OUTPATIENT
Start: 2020-10-08 | End: 2021-10-21

## 2020-10-08 ASSESSMENT — PATIENT HEALTH QUESTIONNAIRE - PHQ9
SUM OF ALL RESPONSES TO PHQ9 QUESTIONS 1 & 2: 0
SUM OF ALL RESPONSES TO PHQ QUESTIONS 1-9: 0
2. FEELING DOWN, DEPRESSED OR HOPELESS: 0
SUM OF ALL RESPONSES TO PHQ QUESTIONS 1-9: 0
1. LITTLE INTEREST OR PLEASURE IN DOING THINGS: 0

## 2020-10-08 NOTE — PROGRESS NOTES
[facility] PHYSICIAN SERVICES  [facility] FAMILY MEDICINE  [address] [facility] [address]  [address] [address]  Dept: [phone]  Dept Fax: [phone]  Loc: [phone]    Anny Wolff is a 60 y.o. female who presents today for her medical conditions/complaints as noted below. Anny Wolff is here for 3 Month Follow-Up        HPI:   CC: Here today to discuss the following:    Hypertension  Compliant with medications. No adverse effects from medication. No lightheadedness, palpitations, or chest pain. Lower Back Pain, Chronic  Compliant with medications. No adverse effects from medication. Symptoms continue to be stable. Lower back pain is described as a dull ache and occasionally sharp and stabbing. No radiation. Relieved with her current pain medication. No numbness or weakness in lower extremities. Currently satisfied with treatment regimen as it provides some relief. Depression with Anxiety  Compliant with medications. No adverse effects from medication. Depression and anxiety symptoms are stable today. No suicidal or homicidal ideation. Excessive worry, insomnia, and anxiousness are stable. Energy, concentration, and apathy are stable. She continues to take Eliquis for recurrent DVTs of the lower extremities.             HPI    Past Medical History:   Diagnosis Date    Chronic midline low back pain without sciatica 10/2/2017    Essential (primary) hypertension 10/2/2017    Familial hypercholesterolemia 10/2/2017    Generalized anxiety disorder 10/2/2017    Glaucoma     Obstructive sleep apnea syndrome 10/2/2017    Osteoarthritis, knee       Past Surgical History:   Procedure Laterality Date    CHOLECYSTECTOMY      HYSTERECTOMY, TOTAL ABDOMINAL      KNEE ARTHROSCOPY      TUBAL LIGATION         Family History   Problem Relation Age of Onset    Heart Disease Mother     Heart Disease Father     Other Maternal Grandmother         aneurysm       Social History Tobacco Use    Smoking status: Passive Smoke Exposure - Never Smoker    Smokeless tobacco: Never Used   Substance Use Topics    Alcohol use: Not Currently     Current Outpatient Medications   Medication Sig Dispense Refill    Calcium Carbonate (CALCIUM 600 PO) Take by mouth      losartan (COZAAR) 50 MG tablet Take 1 tablet by mouth daily 90 tablet 3    ELIQUIS 5 MG TABS tablet TAKE 1 TABLET BY MOUTH 2 TIMES A DAY 60 tablet 2    metoprolol tartrate (LOPRESSOR) 25 MG tablet Take 1 tablet by mouth 2 times daily 180 tablet 3    furosemide (LASIX) 40 MG tablet Take 1 tablet by mouth daily 90 tablet 3    hydrOXYzine (ATARAX) 25 MG tablet TAKE 1 TO 2 TABLET BY MOUTH AT BEDTIME AS NEEDED FOR INSOMNIA (Patient taking differently: TAKE 2 TABLET BY MOUTH AT BEDTIME AS NEEDED FOR INSOMNIA) 60 tablet 5    sertraline (ZOLOFT) 100 MG tablet Take 1 tablet by mouth daily 30 tablet 5    Latanoprostene Bunod (VYZULTA) 0.024 % SOLN Apply to eye      cyclobenzaprine (FLEXERIL) 10 MG tablet TAKE 1 TABLET BY MOUTH EVERY NIGHT AT BEDTIME AS NEEDED FOR MUSCLE SPASMS 90 tablet 2    Turmeric 450 MG CAPS Take by mouth      aspirin 81 MG EC tablet Take 81 mg by mouth daily      Ascorbic Acid (VITAMIN C) 500 MG CAPS Take by mouth      Cholecalciferol (VITAMIN D3) 2000 units CAPS Take by mouth 2 times daily      Calcium Carbonate-Vit D-Min (CALCIUM-VITAMIN D-MINERALS) 600-800 MG-UNIT CHEW Take by mouth      HYDROcodone-acetaminophen (NORCO) 5-325 MG per tablet Take 1 tablet by mouth daily as needed for Pain for up to 30 days. Intended supply: 3 days. Take lowest dose possible to manage pain (Patient not taking: Reported on 10/8/2020) 30 tablet 0    brimonidine (ALPHAGAN P) 0.1 % SOLN 1 drop 2 times daily      Latanoprost-Timolol Maleate 0.005-0.5 % SOLN Apply to eye       No current facility-administered medications for this visit.       Allergies   Allergen Reactions    Ceclor [Cefaclor]     Ciprofloxacin Hcl     Ultram [Tramadol]      fatigue    Zocor [Simvastatin]      cough       Health Maintenance   Topic Date Due    Hepatitis C screen  1955    HIV screen  10/23/1972    A1C test (Diabetic or Prediabetic)  09/22/2018    Flu vaccine (1) 09/01/2020    Breast cancer screen  09/10/2020    Shingles Vaccine (1 of 2) 01/09/2021 (Originally 10/23/2007)    Potassium monitoring  10/05/2021    Creatinine monitoring  10/05/2021    Colon cancer screen fecal DNA test (Cologuard)  10/15/2022    Lipid screen  10/05/2025    DTaP/Tdap/Td vaccine (2 - Td) 05/01/2028    Hepatitis A vaccine  Aged Out    Hepatitis B vaccine  Aged Out    Hib vaccine  Aged Out    Meningococcal (ACWY) vaccine  Aged Out    Pneumococcal 0-64 years Vaccine  Aged Out       Subjective:      Review of Systems    Review of Systems   Constitutional: Negative for chills and fever. HENT: Negative for congestion. Respiratory: Negative for cough, chest tightness and shortness of breath. Cardiovascular: Negative for chest pain, palpitations and leg swelling. Gastrointestinal: Negative for abdominal pain, anal bleeding, constipation, diarrhea and nausea. Genitourinary: Negative for difficulty urinating. Psychiatric/Behavioral: Negative. SeeHPI for visit specific review of symptoms. All others negative      Objective:   /76   Pulse 81   Temp 97.6 °F (36.4 °C)   Wt 253 lb 12.8 oz (115.1 kg)   SpO2 98%   BMI 42.23 kg/m²   Physical Exam  Physical Exam   Constitutional: She appears well-developed. Does not appear ill. Eyes: Pupils are equal, round, and reactive to light. Conjunctiva and Lids normal.  Neck: Normal range of motion. Neck supple. No masses. Neck Symmetric. Normal tracheal position. No thyroid enlargement  Cardiovascular: Normal rate and regular rhythm. Exam reveals no friction rub. Carotid arteries: no bruit observed. No murmur heard. Respiratory:  Effort normal and breath sounds normal. No respiratory distress.  No wheezes. No rales. No use of accessory muscles or intercostal retractions. Abdominal: Soft. Bowel sounds are normal. exhibits no distension. There is no tenderness. There is no rebound and no guarding. Musculoskeletal: exhibits no edema. Normal gait. Neurological: alert. Psychiatric: normal mood and affect. Her behavior is normal. Normal judgement and insight observed.       Recent Results (from the past 672 hour(s))   POCT Venous    Collection Time: 09/15/20  9:18 AM   Result Value Ref Range    POC Creatinine 0.9 0.3 - 1.3 mg/dL    GFR Non-African American >60 >60    GFR  >60 >60    Sample Type Unspecified     Performed on [facility]    CBC Auto Differential    Collection Time: 10/05/20  6:39 AM   Result Value Ref Range    WBC 7.9 4.8 - 10.8 K/uL    RBC 4.91 4.20 - 5.40 M/uL    Hemoglobin 14.0 12.0 - 16.0 g/dL    Hematocrit 43.9 37.0 - 47.0 %    MCV 89.4 81.0 - 99.0 fL    MCH 28.5 27.0 - 31.0 pg    MCHC 31.9 (L) 33.0 - 37.0 g/dL    RDW 13.8 11.5 - 14.5 %    Platelets 328 624 - 805 K/uL    MPV 10.4 9.4 - 12.3 fL    Neutrophils % 49.0 (L) 50.0 - 65.0 %    Lymphocytes % 38.3 20.0 - 40.0 %    Monocytes % 9.1 0.0 - 10.0 %    Eosinophils % 2.4 0.0 - 5.0 %    Basophils % 0.6 0.0 - 1.0 %    Neutrophils Absolute 3.9 1.5 - 7.5 K/uL    Immature Granulocytes # 0.1 K/uL    Lymphocytes Absolute 3.0 1.1 - 4.5 K/uL    Monocytes Absolute 0.70 0.00 - 0.90 K/uL    Eosinophils Absolute 0.20 0.00 - 0.60 K/uL    Basophils Absolute 0.10 0.00 - 0.20 K/uL   Comprehensive Metabolic Panel    Collection Time: 10/05/20  6:39 AM   Result Value Ref Range    Sodium 140 136 - 145 mmol/L    Potassium 5.0 3.5 - 5.0 mmol/L    Chloride 105 98 - 111 mmol/L    CO2 26 22 - 29 mmol/L    Anion Gap 9 7 - 19 mmol/L    Glucose 93 74 - 109 mg/dL    BUN 14 8 - 23 mg/dL    CREATININE 0.8 0.5 - 0.9 mg/dL    GFR Non-African American >60 >60    GFR African American >59 >59    Calcium 9.0 8.8 - 10.2 mg/dL    Total Protein 6.7 6.6 - 8.7 g/dL    Alb 4.0 3.5 - 5.2 g/dL    Total Bilirubin 0.3 0.2 - 1.2 mg/dL    Alkaline Phosphatase 57 35 - 104 U/L    ALT 15 5 - 33 U/L    AST 18 5 - 32 U/L   Lipid Panel    Collection Time: 10/05/20  6:39 AM   Result Value Ref Range    Cholesterol, Total 235 (H) 160 - 199 mg/dL    Triglycerides 192 (H) 0 - 149 mg/dL    HDL 62 (L) 65 - 121 mg/dL    LDL Calculated 135 <100 mg/dL       Conclusions      Summary   Structurally normal mitral valve. Structurally normal aortic valve. Tricuspid valve is structurally normal.   Mild tricuspid regurgitation with estimated RVSP of 28 mm Hg. Normal left ventricular size with preserved LV function and an estimated   ejection fraction of approximately 55-60%. Left ventricular size is normal .   Normal left ventricular wall thickness. No regional wall motion abnormalities. Signature      ----------------------------------------------------------------   Electronically signed by Tere Mcgee MD(Interpreting   physician) on 09/09/2020 01:21 PM   ----------------------------------------------------------------       The 10-year ASCVD risk score (Yisel Caldwell et al., 2013) is: 5.3%    Values used to calculate the score:      Age: 60 years      Sex: Female      Is Non- : No      Diabetic: No      Tobacco smoker: No      Systolic Blood Pressure: 163 mmHg      Is BP treated: Yes      HDL Cholesterol: 62 mg/dL      Total Cholesterol: 235 mg/dL        Assessment & Plan: The following diagnoses and conditions are stable with no further orders unless indicated:  1. Essential (primary) hypertension  BP Readings from Last 3 Encounters:   10/08/20 124/76   09/14/20 (!) 140/72   08/25/20 126/84     Blood pressure stable  - losartan (COZAAR) 50 MG tablet; Take 1 tablet by mouth daily  Dispense: 90 tablet; Refill: 3  - CBC Auto Differential; Future    2. Breast cancer screening by mammogram    - [facility] DIGITAL SCREEN W OR WO CAD BILATERAL; Future    3.  Need for hepatitis C screening test    - Hepatitis C Antibody; Future    4. Familial hypercholesterolemia  Lab Results   Component Value Date    CHOL 235 (H) 10/05/2020    CHOL 266 (H) 01/03/2020    CHOL 221 (H) 03/12/2019     Lab Results   Component Value Date    TRIG 192 (H) 10/05/2020    TRIG 203 (H) 01/03/2020    TRIG 116 03/12/2019     Lab Results   Component Value Date    HDL 62 (L) 10/05/2020    HDL 69 01/03/2020    HDL 66 03/12/2019     Lab Results   Component Value Date    LDLCALC 135 10/05/2020    LDLCALC 156 01/03/2020    LDLCALC 132 03/12/2019     No results found for: LABVLDL, VLDL  No results found for: CHOLHDLRATIO  Cholesterol has improved. Encouraged weight loss    5. Chronic midline low back pain without sciatica  Stable    6. Lipid screening  As above  - Comprehensive Metabolic Panel; Future  - Lipid Panel; Future    7. Screening for diabetes mellitus  Lab Results   Component Value Date    LABA1C 5.7 09/22/2017     Lab Results   Component Value Date    LDLCALC 135 10/05/2020    CREATININE 0.8 10/05/2020     Encouraged ADA diet  - Hemoglobin A1C; Future    8. Arthritis, lumbar spine  Continue with Flexeril and Tylenol    9. Depression with anxiety  Stable on Zoloft    10. Recurrent deep vein thrombosis (DVT) ([address]  Continue with Eliquis            Return in about 6 months (around 4/8/2021) for Routine follow up - 15 minute visit. Discussed use, benefit, and side effects of prescribed medications. All patient questions answered. Pt voiced understanding. Reviewed health maintenance. Instructedto continue current medications, diet and exercise. Patient agreed with treatmentplan. Follow up as directed.        Note dictated using [id number] [place] [employer]

## 2020-10-28 ENCOUNTER — HOSPITAL ENCOUNTER (OUTPATIENT)
Dept: WOMENS IMAGING | Age: 65
Discharge: HOME | End: 2020-10-28
Payer: COMMERCIAL

## 2020-10-28 PROCEDURE — 77063 BREAST TOMOSYNTHESIS BI: CPT

## 2020-10-28 NOTE — LETTER
[facility]  [address]  [address] [address]  Phone: [phone]    [facility] MAMMO RM 2        October 29, 2020    [address] [id number]      Dear Yazmin Menchaca: According to the radiologists interpretation, your mammogram did not show any significant findings. Please remember that some cancers (about 10-15%) cannot be found by mammography alone, and that early detection requires a combination of monthly self-examination, yearly physical examination, and periodic mammography. The American Cancer Society Guidelines recommend screening mammograms and physical breast examinations every year beginning at the age of 36. Please continue regular breast self-examinations and report any changes that concern you, even before your next appointment. If you have any further questions, please don't hesitate to call the office. Sincerely,       Tc Weeks.  Aby Lezama MD

## 2020-11-03 RX ORDER — CYCLOBENZAPRINE HCL 10 MG
TABLET ORAL
Qty: 90 TABLET | Refills: 2 | Status: SHIPPED | OUTPATIENT
Start: 2020-11-03 | End: 2021-09-29

## 2020-12-15 ENCOUNTER — HOSPITAL ENCOUNTER (OUTPATIENT)
Dept: GENERAL RADIOLOGY | Age: 65
Discharge: HOME | End: 2020-12-15
Payer: COMMERCIAL

## 2020-12-15 ENCOUNTER — OFFICE VISIT (OUTPATIENT)
Dept: URGENT CARE | Age: 65
End: 2020-12-15
Payer: COMMERCIAL

## 2020-12-15 VITALS
BODY MASS INDEX: 41.65 KG/M2 | HEIGHT: 65 IN | HEART RATE: 114 BPM | TEMPERATURE: 98.6 F | WEIGHT: 250 LBS | RESPIRATION RATE: 20 BRPM | OXYGEN SATURATION: 95 % | SYSTOLIC BLOOD PRESSURE: 130 MMHG | DIASTOLIC BLOOD PRESSURE: 76 MMHG

## 2020-12-15 PROCEDURE — 99213 OFFICE O/P EST LOW 20 MIN: CPT | Performed by: NURSE PRACTITIONER

## 2020-12-15 PROCEDURE — 71046 X-RAY EXAM CHEST 2 VIEWS: CPT

## 2020-12-15 RX ORDER — AZITHROMYCIN 250 MG/1
250 TABLET, FILM COATED ORAL SEE ADMIN INSTRUCTIONS
Qty: 6 TABLET | Refills: 0 | Status: SHIPPED | OUTPATIENT
Start: 2020-12-15 | End: 2020-12-20

## 2020-12-15 RX ORDER — METHYLPREDNISOLONE 4 MG/1
TABLET ORAL
Qty: 1 KIT | Refills: 0 | Status: SHIPPED | OUTPATIENT
Start: 2020-12-15 | End: 2021-07-18 | Stop reason: ALTCHOICE

## 2020-12-15 RX ORDER — ALBUTEROL SULFATE 90 UG/1
2 AEROSOL, METERED RESPIRATORY (INHALATION) 4 TIMES DAILY PRN
Qty: 1 INHALER | Refills: 0 | Status: SHIPPED | OUTPATIENT
Start: 2020-12-15

## 2020-12-15 ASSESSMENT — ENCOUNTER SYMPTOMS
NAUSEA: 0
CONSTIPATION: 0
WHEEZING: 1
EYES NEGATIVE: 1
SHORTNESS OF BREATH: 1
COUGH: 1
SORE THROAT: 0
DIARRHEA: 0
CHEST TIGHTNESS: 0
VOMITING: 0

## 2020-12-15 NOTE — PROGRESS NOTES
 brimonidine (ALPHAGAN P) 0.1 % SOLN 1 drop 2 times daily      Latanoprost-Timolol Maleate 0.005-0.5 % SOLN Apply to eye      Turmeric 450 MG CAPS Take by mouth      aspirin 81 MG EC tablet Take 81 mg by mouth daily      Ascorbic Acid (VITAMIN C) 500 MG CAPS Take by mouth      Cholecalciferol (VITAMIN D3) 2000 units CAPS Take by mouth 2 times daily      HYDROcodone-acetaminophen (NORCO) 5-325 MG per tablet Take 1 tablet by mouth daily as needed for Pain for up to 30 days. Intended supply: 3 days. Take lowest dose possible to manage pain (Patient not taking: Reported on 10/8/2020) 30 tablet 0     No current facility-administered medications for this visit. Allergies   Allergen Reactions    Ceclor [Cefaclor]     Ciprofloxacin Hcl     Ultram [Tramadol]      fatigue    Zocor [Simvastatin]      cough       Health Maintenance   Topic Date Due    Hepatitis C screen  1955    HIV screen  10/23/1972    A1C test (Diabetic or Prediabetic)  09/22/2018    Shingles Vaccine (1 of 2) 01/09/2021 (Originally 10/23/2007)    Potassium monitoring  10/05/2021    Creatinine monitoring  10/05/2021    Breast cancer screen  10/28/2021    Colon cancer screen fecal DNA test (Cologuard)  10/15/2022    Lipid screen  10/05/2025    DTaP/Tdap/Td vaccine (2 - Td) 05/01/2028    Flu vaccine  Completed    Hepatitis A vaccine  Aged Out    Hepatitis B vaccine  Aged Out    Hib vaccine  Aged Out    Meningococcal (ACWY) vaccine  Aged Out    Pneumococcal 0-64 years Vaccine  Aged Out       Subjective:     Review of Systems   Constitutional: Negative for fever. HENT: Negative for congestion and sore throat. Eyes: Negative. Respiratory: Positive for cough, shortness of breath and wheezing. Negative for chest tightness. Cardiovascular: Negative for chest pain and palpitations. Gastrointestinal: Negative for constipation, diarrhea, nausea and vomiting. Endocrine: Negative. Genitourinary: Negative. Musculoskeletal: Negative. Skin: Negative. Neurological: Negative for dizziness, speech difficulty, weakness and numbness. Psychiatric/Behavioral: Negative for confusion. All other systems reviewed and are negative. Objective:     Physical Exam  Vitals signs and nursing note reviewed. Constitutional:       General: She is not in acute distress. Appearance: Normal appearance. She is not ill-appearing or toxic-appearing. HENT:      Head: Normocephalic and atraumatic. Right Ear: External ear normal.      Left Ear: External ear normal.   Eyes:      Extraocular Movements: Extraocular movements intact. Conjunctiva/sclera: Conjunctivae normal.      Pupils: Pupils are equal, round, and reactive to light. Cardiovascular:      Rate and Rhythm: Normal rate and regular rhythm. Heart sounds: Normal heart sounds. No murmur. Pulmonary:      Effort: Pulmonary effort is normal. No respiratory distress. Breath sounds: No stridor. Wheezing present. No rhonchi. Musculoskeletal:         General: No swelling or signs of injury. Skin:     General: Skin is warm and dry. Findings: No rash. Neurological:      General: No focal deficit present. Mental Status: She is alert and oriented to person, place, and time. Motor: No weakness. Psychiatric:         Mood and Affect: Mood normal.       /76   Pulse 114   Temp 98.6 °F (37 °C)   Resp 20   Ht 5' 5\" (1.651 m)   Wt 250 lb (113.4 kg)   SpO2 95%   BMI 41.60 kg/m²     Assessment:       Diagnosis Orders   1.  Dyspnea due to COVID-19  XR CHEST STANDARD (2 VW)    albuterol sulfate HFA (VENTOLIN HFA) 108 (90 Base) MCG/ACT inhaler    azithromycin (ZITHROMAX) 250 MG tablet    methylPREDNISolone (MEDROL DOSEPACK) 4 MG tablet       Plan:      Orders Placed This Encounter   Procedures    XR CHEST STANDARD (2 VW)     Standing Status:   Future     Number of Occurrences:   1     Standing Expiration Date:   12/15/2021 medications, diet and exercise. Patient agreed with treatment plan. Follow up as directed. There are no Patient Instructions on file for this visit.       Electronically signed by SAQIB Warner CNP on 12/15/2020 at 10:35 AM

## 2020-12-18 RX ORDER — SERTRALINE HYDROCHLORIDE 100 MG/1
100 TABLET, FILM COATED ORAL DAILY
Qty: 90 TABLET | Refills: 3 | Status: SHIPPED | OUTPATIENT
Start: 2020-12-18 | End: 2021-11-30

## 2021-01-04 RX ORDER — APIXABAN 5 MG/1
TABLET, FILM COATED ORAL
Qty: 60 TABLET | Refills: 2 | Status: SHIPPED | OUTPATIENT
Start: 2021-01-04 | End: 2021-04-02

## 2021-03-04 RX ORDER — HYDROXYZINE HYDROCHLORIDE 25 MG/1
TABLET, FILM COATED ORAL
Qty: 60 TABLET | Refills: 5 | Status: SHIPPED | OUTPATIENT
Start: 2021-03-04 | End: 2021-09-29

## 2021-04-02 RX ORDER — APIXABAN 5 MG/1
TABLET, FILM COATED ORAL
Qty: 60 TABLET | Refills: 2 | Status: SHIPPED | OUTPATIENT
Start: 2021-04-02 | End: 2021-07-09

## 2021-04-05 DIAGNOSIS — Z11.59 NEED FOR HEPATITIS C SCREENING TEST: ICD-10-CM

## 2021-04-05 DIAGNOSIS — Z13.220 LIPID SCREENING: ICD-10-CM

## 2021-04-05 DIAGNOSIS — I10 ESSENTIAL (PRIMARY) HYPERTENSION: ICD-10-CM

## 2021-04-05 DIAGNOSIS — Z13.1 SCREENING FOR DIABETES MELLITUS: ICD-10-CM

## 2021-04-05 LAB
ALBUMIN SERPL-MCNC: 4 G/DL (ref 3.5–5.2)
ALP BLD-CCNC: 55 U/L (ref 35–104)
ALT SERPL-CCNC: 17 U/L (ref 5–33)
ANION GAP SERPL CALCULATED.3IONS-SCNC: 8 MMOL/L (ref 7–19)
AST SERPL-CCNC: 19 U/L (ref 5–32)
BASOPHILS ABSOLUTE: 0 K/UL (ref 0–0.2)
BASOPHILS RELATIVE PERCENT: 0.5 % (ref 0–1)
BILIRUB SERPL-MCNC: <0.2 MG/DL (ref 0.2–1.2)
BUN BLDV-MCNC: 13 MG/DL (ref 8–23)
CALCIUM SERPL-MCNC: 9.1 MG/DL (ref 8.8–10.2)
CHLORIDE BLD-SCNC: 106 MMOL/L (ref 98–111)
CHOLESTEROL, TOTAL: 253 MG/DL (ref 160–199)
CO2: 27 MMOL/L (ref 22–29)
CREAT SERPL-MCNC: 0.7 MG/DL (ref 0.5–0.9)
EOSINOPHILS ABSOLUTE: 0.2 K/UL (ref 0–0.6)
EOSINOPHILS RELATIVE PERCENT: 2.1 % (ref 0–5)
GFR AFRICAN AMERICAN: >59
GFR NON-AFRICAN AMERICAN: >60
GLUCOSE BLD-MCNC: 104 MG/DL (ref 74–109)
HBA1C MFR BLD: 5.4 % (ref 4–6)
HCT VFR BLD CALC: 44.5 % (ref 37–47)
HDLC SERPL-MCNC: 65 MG/DL (ref 65–121)
HEMOGLOBIN: 14 G/DL (ref 12–16)
HEPATITIS C ANTIBODY INTERPRETATION: NORMAL
IMMATURE GRANULOCYTES #: 0 K/UL
LDL CHOLESTEROL CALCULATED: 161 MG/DL
LYMPHOCYTES ABSOLUTE: 3.2 K/UL (ref 1.1–4.5)
LYMPHOCYTES RELATIVE PERCENT: 36.1 % (ref 20–40)
MCH RBC QN AUTO: 29 PG (ref 27–31)
MCHC RBC AUTO-ENTMCNC: 31.5 G/DL (ref 33–37)
MCV RBC AUTO: 92.1 FL (ref 81–99)
MONOCYTES ABSOLUTE: 0.7 K/UL (ref 0–0.9)
MONOCYTES RELATIVE PERCENT: 8 % (ref 0–10)
NEUTROPHILS ABSOLUTE: 4.7 K/UL (ref 1.5–7.5)
NEUTROPHILS RELATIVE PERCENT: 52.8 % (ref 50–65)
PDW BLD-RTO: 13.8 % (ref 11.5–14.5)
PLATELET # BLD: 244 K/UL (ref 130–400)
PMV BLD AUTO: 10.3 FL (ref 9.4–12.3)
POTASSIUM SERPL-SCNC: 4.9 MMOL/L (ref 3.5–5)
RBC # BLD: 4.83 M/UL (ref 4.2–5.4)
SODIUM BLD-SCNC: 141 MMOL/L (ref 136–145)
TOTAL PROTEIN: 6.6 G/DL (ref 6.6–8.7)
TRIGL SERPL-MCNC: 137 MG/DL (ref 0–149)
WBC # BLD: 8.8 K/UL (ref 4.8–10.8)

## 2021-04-12 ENCOUNTER — OFFICE VISIT (OUTPATIENT)
Dept: FAMILY MEDICINE CLINIC | Age: 66
End: 2021-04-12
Payer: COMMERCIAL

## 2021-04-12 ENCOUNTER — HOSPITAL ENCOUNTER (OUTPATIENT)
Dept: GENERAL RADIOLOGY | Age: 66
Discharge: HOME | End: 2021-04-12
Payer: COMMERCIAL

## 2021-04-12 VITALS
BODY MASS INDEX: 43.6 KG/M2 | HEART RATE: 93 BPM | SYSTOLIC BLOOD PRESSURE: 112 MMHG | DIASTOLIC BLOOD PRESSURE: 78 MMHG | WEIGHT: 262 LBS | OXYGEN SATURATION: 96 % | TEMPERATURE: 97 F

## 2021-04-12 DIAGNOSIS — I10 ESSENTIAL (PRIMARY) HYPERTENSION: Primary | ICD-10-CM

## 2021-04-12 DIAGNOSIS — G89.29 CHRONIC MIDLINE LOW BACK PAIN WITHOUT SCIATICA: ICD-10-CM

## 2021-04-12 DIAGNOSIS — M54.50 CHRONIC MIDLINE LOW BACK PAIN WITHOUT SCIATICA: ICD-10-CM

## 2021-04-12 DIAGNOSIS — R06.00 DYSPNEA, UNSPECIFIED TYPE: ICD-10-CM

## 2021-04-12 DIAGNOSIS — I82.409 RECURRENT DEEP VEIN THROMBOSIS (DVT) (HCC): ICD-10-CM

## 2021-04-12 DIAGNOSIS — I73.9 INTERMITTENT CLAUDICATION (HCC): ICD-10-CM

## 2021-04-12 DIAGNOSIS — E78.01 FAMILIAL HYPERCHOLESTEROLEMIA: ICD-10-CM

## 2021-04-12 PROCEDURE — 99214 OFFICE O/P EST MOD 30 MIN: CPT | Performed by: FAMILY MEDICINE

## 2021-04-12 PROCEDURE — 71046 X-RAY EXAM CHEST 2 VIEWS: CPT

## 2021-04-12 RX ORDER — PRAVASTATIN SODIUM 10 MG
10 TABLET ORAL DAILY
Qty: 30 TABLET | Refills: 5 | Status: SHIPPED | OUTPATIENT
Start: 2021-04-12 | End: 2021-11-04

## 2021-04-12 ASSESSMENT — PATIENT HEALTH QUESTIONNAIRE - PHQ9
SUM OF ALL RESPONSES TO PHQ QUESTIONS 1-9: 0
SUM OF ALL RESPONSES TO PHQ9 QUESTIONS 1 & 2: 0
SUM OF ALL RESPONSES TO PHQ QUESTIONS 1-9: 0
2. FEELING DOWN, DEPRESSED OR HOPELESS: 0

## 2021-04-12 NOTE — PROGRESS NOTES
current facility-administered medications for this visit. Allergies   Allergen Reactions    Ceclor [Cefaclor]     Ciprofloxacin Hcl     Ultram [Tramadol]      fatigue    Zocor [Simvastatin]      cough       Health Maintenance   Topic Date Due    HIV screen  Never done    Shingles Vaccine (1 of 2) Never done    Breast cancer screen  10/28/2021    Lipid screen  04/05/2022    Potassium monitoring  04/05/2022    Creatinine monitoring  04/05/2022    Colon cancer screen fecal DNA test (Cologuard)  10/15/2022    Diabetes screen  04/05/2024    DTaP/Tdap/Td vaccine (2 - Td) 05/01/2028    Flu vaccine  Completed    COVID-19 Vaccine  Completed    Hepatitis C screen  Completed    Hepatitis A vaccine  Aged Out    Hepatitis B vaccine  Aged Out    Hib vaccine  Aged Out    Meningococcal (ACWY) vaccine  Aged Out    Pneumococcal 0-64 years Vaccine  Aged Out       Subjective:      Review of Systems   Constitutional: Negative for chills and fever. HENT: Negative for congestion. Respiratory: Positive for shortness of breath. Negative for cough and chest tightness. Cardiovascular: Negative for chest pain, palpitations and leg swelling. Gastrointestinal: Negative for abdominal pain, anal bleeding, constipation, diarrhea and nausea. Genitourinary: Negative for difficulty urinating. Musculoskeletal: Positive for arthralgias and myalgias. Negative for back pain, gait problem and joint swelling. Psychiatric/Behavioral: Negative. SeeHPI for visit specific review of symptoms. All others negative      Objective:   /78   Pulse 93   Temp 97 °F (36.1 °C)   Wt 262 lb (118.8 kg)   SpO2 96%   BMI 43.60 kg/m²   Physical Exam  Constitutional:       Appearance: She is well-developed. She is not ill-appearing. Eyes:      Pupils: Pupils are equal, round, and reactive to light. Neck:      Musculoskeletal: Normal range of motion and neck supple.    Cardiovascular:      Rate and Rhythm: Normal rate and regular rhythm. Heart sounds: No murmur. No friction rub. Pulmonary:      Effort: Pulmonary effort is normal. No respiratory distress. Breath sounds: Normal breath sounds. No wheezing or rales. Abdominal:      General: Bowel sounds are normal. There is no distension. Palpations: Abdomen is soft. Tenderness: There is no abdominal tenderness. There is no guarding or rebound. Neurological:      Mental Status: She is alert.    Psychiatric:         Behavior: Behavior normal.           Recent Results (from the past 672 hour(s))   Comprehensive Metabolic Panel    Collection Time: 04/05/21  7:39 AM   Result Value Ref Range    Sodium 141 136 - 145 mmol/L    Potassium 4.9 3.5 - 5.0 mmol/L    Chloride 106 98 - 111 mmol/L    CO2 27 22 - 29 mmol/L    Anion Gap 8 7 - 19 mmol/L    Glucose 104 74 - 109 mg/dL    BUN 13 8 - 23 mg/dL    CREATININE 0.7 0.5 - 0.9 mg/dL    GFR Non-African American >60 >60    GFR African American >59 >59    Calcium 9.1 8.8 - 10.2 mg/dL    Total Protein 6.6 6.6 - 8.7 g/dL    Albumin 4.0 3.5 - 5.2 g/dL    Total Bilirubin <0.2 0.2 - 1.2 mg/dL    Alkaline Phosphatase 55 35 - 104 U/L    ALT 17 5 - 33 U/L    AST 19 5 - 32 U/L   Hemoglobin A1C    Collection Time: 04/05/21  7:39 AM   Result Value Ref Range    Hemoglobin A1C 5.4 4.0 - 6.0 %   Hepatitis C Antibody    Collection Time: 04/05/21  7:39 AM   Result Value Ref Range    Hep C Ab Interp Non-Reactive    CBC Auto Differential    Collection Time: 04/05/21  7:39 AM   Result Value Ref Range    WBC 8.8 4.8 - 10.8 K/uL    RBC 4.83 4.20 - 5.40 M/uL    Hemoglobin 14.0 12.0 - 16.0 g/dL    Hematocrit 44.5 37.0 - 47.0 %    MCV 92.1 81.0 - 99.0 fL    MCH 29.0 27.0 - 31.0 pg    MCHC 31.5 (L) 33.0 - 37.0 g/dL    RDW 13.8 11.5 - 14.5 %    Platelets 292 288 - 530 K/uL    MPV 10.3 9.4 - 12.3 fL    Neutrophils % 52.8 50.0 - 65.0 %    Lymphocytes % 36.1 20.0 - 40.0 %    Monocytes % 8.0 0.0 - 10.0 %    Eosinophils % 2.1 0.0 - 5.0 %    Basophils % 0.5 0.0 - 1.0 %    Neutrophils Absolute 4.7 1.5 - 7.5 K/uL    Immature Granulocytes # 0.0 K/uL    Lymphocytes Absolute 3.2 1.1 - 4.5 K/uL    Monocytes Absolute 0.70 0.00 - 0.90 K/uL    Eosinophils Absolute 0.20 0.00 - 0.60 K/uL    Basophils Absolute 0.00 0.00 - 0.20 K/uL   Lipid Panel    Collection Time: 04/05/21  7:39 AM   Result Value Ref Range    Cholesterol, Total 253 (H) 160 - 199 mg/dL    Triglycerides 137 0 - 149 mg/dL    HDL 65 65 - 121 mg/dL    LDL Calculated 161 <100 mg/dL               Assessment & Plan: The following diagnoses and conditions are stable with no further orders unless indicated:  1. Essential (primary) hypertension  BP Readings from Last 3 Encounters:   04/12/21 112/78   12/15/20 130/76   10/08/20 124/76     Blood pressure stable    2. Chronic midline low back pain without sciatica  Stable continue with Tylenol. Avoid NSAIDs    3. Recurrent deep vein thrombosis (DVT) (HCC)  Continue with Eliquis    4. Familial hypercholesterolemia  She tried statins in the past and had adverse effects. Suggest we start a low intensity statin and adjust as tolerated  Lab Results   Component Value Date    CHOL 253 (H) 04/05/2021    CHOL 235 (H) 10/05/2020    CHOL 266 (H) 01/03/2020     Lab Results   Component Value Date    TRIG 137 04/05/2021    TRIG 192 (H) 10/05/2020    TRIG 203 (H) 01/03/2020     Lab Results   Component Value Date    HDL 65 04/05/2021    HDL 62 (L) 10/05/2020    HDL 69 01/03/2020     Lab Results   Component Value Date    LDLCALC 161 04/05/2021    LDLCALC 135 10/05/2020    LDLCALC 156 01/03/2020     No results found for: LABVLDL, VLDL  No results found for: CHOLHDLRATIO    - pravastatin (PRAVACHOL) 10 MG tablet; Take 1 tablet by mouth daily  Dispense: 30 tablet; Refill: 5  - Comprehensive Metabolic Panel; Future  - CK; Future  - Lipid Panel; Future    5. Dyspnea, unspecified type  Obtain pulmonary function testing and chest x-ray  - XR CHEST (2 VW);  Future  - Full PFT Study With Bronchodilator; Future    6. Intermittent claudication (HCC)  Arterial studies of the bilateral lower extremities  - VL LOWER EXTREMITY ARTERIAL SEGMENTAL PRESSURES W PPG; Future            Return in about 3 months (around 7/12/2021) for Routine follow up - 15 minute visit. Discussed use, benefit, and side effects of prescribed medications. All patient questions answered. Pt voiced understanding. Reviewed health maintenance. Instructedto continue current medications, diet and exercise. Patient agreed with treatmentplan. Follow up as directed. Note dictated using Dragon Dictation software  Sometimes this dictation software makes erroneous transcriptions.

## 2021-04-15 ASSESSMENT — ENCOUNTER SYMPTOMS
ABDOMINAL PAIN: 0
NAUSEA: 0
SHORTNESS OF BREATH: 1
COUGH: 0
DIARRHEA: 0
BACK PAIN: 0
CONSTIPATION: 0
CHEST TIGHTNESS: 0
ANAL BLEEDING: 0

## 2021-05-08 ENCOUNTER — OFFICE VISIT (OUTPATIENT)
Age: 66
End: 2021-05-08

## 2021-05-08 DIAGNOSIS — Z11.59 SCREENING FOR VIRAL DISEASE: Primary | ICD-10-CM

## 2021-05-08 LAB — SARS-COV-2, PCR: NOT DETECTED

## 2021-05-08 PROCEDURE — 99999 PR OFFICE/OUTPT VISIT,PROCEDURE ONLY: CPT | Performed by: NURSE PRACTITIONER

## 2021-05-10 ENCOUNTER — HOSPITAL ENCOUNTER (OUTPATIENT)
Dept: PULMONOLOGY | Age: 66
Discharge: HOME | End: 2021-05-10
Payer: COMMERCIAL

## 2021-05-10 VITALS — HEIGHT: 65 IN | BODY MASS INDEX: 37.49 KG/M2 | WEIGHT: 225 LBS

## 2021-05-10 DIAGNOSIS — R06.00 DYSPNEA, UNSPECIFIED TYPE: ICD-10-CM

## 2021-05-10 PROCEDURE — 94729 DIFFUSING CAPACITY: CPT

## 2021-05-10 PROCEDURE — 94729 DIFFUSING CAPACITY: CPT | Performed by: INTERNAL MEDICINE

## 2021-05-10 PROCEDURE — 94726 PLETHYSMOGRAPHY LUNG VOLUMES: CPT | Performed by: INTERNAL MEDICINE

## 2021-05-10 PROCEDURE — 94727 GAS DIL/WSHOT DETER LNG VOL: CPT

## 2021-05-10 PROCEDURE — 94060 EVALUATION OF WHEEZING: CPT | Performed by: INTERNAL MEDICINE

## 2021-05-10 PROCEDURE — 6370000000 HC RX 637 (ALT 250 FOR IP): Performed by: FAMILY MEDICINE

## 2021-05-10 PROCEDURE — 94060 EVALUATION OF WHEEZING: CPT

## 2021-05-10 RX ORDER — ALBUTEROL SULFATE 90 UG/1
4 AEROSOL, METERED RESPIRATORY (INHALATION) ONCE
Status: COMPLETED | OUTPATIENT
Start: 2021-05-10 | End: 2021-05-10

## 2021-05-10 RX ORDER — FEXOFENADINE HCL 180 MG/1
180 TABLET ORAL DAILY
COMMUNITY

## 2021-05-10 RX ADMIN — ALBUTEROL SULFATE 4 PUFF: 90 AEROSOL, METERED RESPIRATORY (INHALATION) at 11:35

## 2021-05-10 NOTE — PROCEDURES
The forced vital capacity is normal.  FEV1 is mildly reduced. FEV1/FVC ratio is normal.  After bronchodilator therapy there was no significant change in FEV1. Total lung capacity is normal.  Residual volume is reduced. Diffusing capacity uncorrected for alveolar volume is normal.    Impression:    Nonspecific pulmonary function study. Clinical correlation recommended.

## 2021-05-14 ENCOUNTER — HOSPITAL ENCOUNTER (OUTPATIENT)
Dept: VASCULAR LAB | Age: 66
Discharge: HOME | End: 2021-05-14
Payer: COMMERCIAL

## 2021-05-14 DIAGNOSIS — I73.9 INTERMITTENT CLAUDICATION (HCC): ICD-10-CM

## 2021-05-14 PROCEDURE — 93923 UPR/LXTR ART STDY 3+ LVLS: CPT

## 2021-07-07 DIAGNOSIS — E78.01 FAMILIAL HYPERCHOLESTEROLEMIA: ICD-10-CM

## 2021-07-07 LAB
ALBUMIN SERPL-MCNC: 4.4 G/DL (ref 3.5–5.2)
ALP BLD-CCNC: 65 U/L (ref 35–104)
ALT SERPL-CCNC: 20 U/L (ref 5–33)
ANION GAP SERPL CALCULATED.3IONS-SCNC: 10 MMOL/L (ref 7–19)
AST SERPL-CCNC: 24 U/L (ref 5–32)
BILIRUB SERPL-MCNC: 0.3 MG/DL (ref 0.2–1.2)
BUN BLDV-MCNC: 12 MG/DL (ref 8–23)
CALCIUM SERPL-MCNC: 9.7 MG/DL (ref 8.8–10.2)
CHLORIDE BLD-SCNC: 106 MMOL/L (ref 98–111)
CHOLESTEROL, TOTAL: 231 MG/DL (ref 160–199)
CO2: 26 MMOL/L (ref 22–29)
CREAT SERPL-MCNC: 0.8 MG/DL (ref 0.5–0.9)
GFR AFRICAN AMERICAN: >59
GFR NON-AFRICAN AMERICAN: >60
GLUCOSE BLD-MCNC: 107 MG/DL (ref 74–109)
HDLC SERPL-MCNC: 73 MG/DL (ref 65–121)
LDL CHOLESTEROL CALCULATED: 133 MG/DL
POTASSIUM SERPL-SCNC: 5.1 MMOL/L (ref 3.5–5)
SODIUM BLD-SCNC: 142 MMOL/L (ref 136–145)
TOTAL CK: 102 U/L (ref 26–192)
TOTAL PROTEIN: 7 G/DL (ref 6.6–8.7)
TRIGL SERPL-MCNC: 127 MG/DL (ref 0–149)

## 2021-07-09 RX ORDER — APIXABAN 5 MG/1
TABLET, FILM COATED ORAL
Qty: 60 TABLET | Refills: 2 | Status: SHIPPED | OUTPATIENT
Start: 2021-07-09 | End: 2021-09-29

## 2021-07-09 NOTE — TELEPHONE ENCOUNTER
Anaya Mcdowellmalik called to request a refill on her medication.       Last office visit : 4/12/2021   Next office visit : 7/13/2021     Requested Prescriptions     Pending Prescriptions Disp Refills    ELIQUIS 5 MG TABS tablet [Pharmacy Med Name: ELIQUIS 5MG TABS] 60 tablet 2     Sig: TAKE 1 TABLET BY MOUTH 2 TIMES A DAY            Trevin Garcia MA

## 2021-07-13 ENCOUNTER — OFFICE VISIT (OUTPATIENT)
Dept: FAMILY MEDICINE CLINIC | Age: 66
End: 2021-07-13
Payer: COMMERCIAL

## 2021-07-13 VITALS
TEMPERATURE: 97 F | WEIGHT: 263 LBS | DIASTOLIC BLOOD PRESSURE: 74 MMHG | HEART RATE: 92 BPM | BODY MASS INDEX: 43.77 KG/M2 | SYSTOLIC BLOOD PRESSURE: 126 MMHG | OXYGEN SATURATION: 98 %

## 2021-07-13 DIAGNOSIS — I10 ESSENTIAL (PRIMARY) HYPERTENSION: ICD-10-CM

## 2021-07-13 DIAGNOSIS — F41.8 DEPRESSION WITH ANXIETY: ICD-10-CM

## 2021-07-13 DIAGNOSIS — G89.29 CHRONIC MIDLINE LOW BACK PAIN WITHOUT SCIATICA: ICD-10-CM

## 2021-07-13 DIAGNOSIS — M17.0 PRIMARY OSTEOARTHRITIS OF BOTH KNEES: ICD-10-CM

## 2021-07-13 DIAGNOSIS — M54.50 CHRONIC MIDLINE LOW BACK PAIN WITHOUT SCIATICA: ICD-10-CM

## 2021-07-13 DIAGNOSIS — E78.01 FAMILIAL HYPERCHOLESTEROLEMIA: Primary | ICD-10-CM

## 2021-07-13 DIAGNOSIS — I82.409 RECURRENT DEEP VEIN THROMBOSIS (DVT) (HCC): ICD-10-CM

## 2021-07-13 PROCEDURE — 99214 OFFICE O/P EST MOD 30 MIN: CPT | Performed by: FAMILY MEDICINE

## 2021-07-13 NOTE — PROGRESS NOTES
[facility] PHYSICIAN SERVICES  [facility] FAMILY MEDICINE  [address] [address]   [place] [address]  Dept: [phone]  Dept Fax: [phone]  Loc: [phone]    Brandee Renae is a 63 y.o. female who presents today for her medical conditions/complaints as noted below. Brandee Renae is here for 3 Month Follow-Up        HPI:   CC: Here today to discuss the following:    Hyperlipidemia  Tolerating current cholesterol medication without side effects. No body aches. Attempting to reduce processed sugar and cholesterol from diet. Hypertension  Compliant with medications. No adverse effects from medication. No lightheadedness, palpitations, or chest pain. Lower Back Pain, Chronic  Compliant with medications. No adverse effects from medication. Symptoms continue to be stable. Lower back pain is described as a dull ache and occasionally sharp and stabbing. No radiation. Relieved with her current pain medication. No numbness or weakness in lower extremities. Currently satisfied with treatment regimen as it provides some relief. She continues have chronic pain of the bilateral knees. She continues to postpone knee replacement surgery as her 's health is taken priority. She has a history of recurrent DVT and is on chronic anticoagulation. Tolerating well. No bleeding issues since last visit      Depression with Anxiety  Compliant with medications. No adverse effects from medication. Depression and anxiety symptoms are stable today. No suicidal or homicidal ideation. Excessive worry, insomnia, and anxiousness are stable. Energy, concentration, and apathy are stable.       HPI    Past Medical History:   Diagnosis Date    Chronic midline low back pain without sciatica 10/2/2017    Essential (primary) hypertension 10/2/2017    Familial hypercholesterolemia 10/2/2017    Generalized anxiety disorder 10/2/2017    Glaucoma     Obstructive sleep apnea syndrome 10/2/2017  Osteoarthritis, knee       Past Surgical History:   Procedure Laterality Date    CHOLECYSTECTOMY      HYSTERECTOMY, TOTAL ABDOMINAL      KNEE ARTHROSCOPY      TUBAL LIGATION         Family History   Problem Relation Age of Onset    Heart Disease Mother     Heart Disease Father     Other Maternal Grandmother         aneurysm       Social History     Tobacco Use    Smoking status: Passive Smoke Exposure - Never Smoker    Smokeless tobacco: Never Used   Substance Use Topics    Alcohol use: Not Currently     Current Outpatient Medications   Medication Sig Dispense Refill    ELIQUIS 5 MG TABS tablet TAKE 1 TABLET BY MOUTH 2 TIMES A DAY 60 tablet 2    fexofenadine (ALLEGRA ALLERGY) 180 MG tablet Take 180 mg by mouth daily      pravastatin (PRAVACHOL) 10 MG tablet Take 1 tablet by mouth daily 30 tablet 5    hydrOXYzine (ATARAX) 25 MG tablet TAKE 1 TO 2 TABLETS BY MOUTH AT BEDTIME AS NEEDED FOR INSOMNIA 60 tablet 5    sertraline (ZOLOFT) 100 MG tablet Take 1 tablet by mouth daily 90 tablet 3    albuterol sulfate HFA (VENTOLIN HFA) 108 (90 Base) MCG/ACT inhaler Inhale 2 puffs into the lungs 4 times daily as needed for Wheezing 1 Inhaler 0    cyclobenzaprine (FLEXERIL) 10 MG tablet TAKE 1 TABLET BY MOUTH EVERY NIGHT AT BEDTIME AS NEEDED FOR MUSCLE SPASMS 90 tablet 2    Calcium Carbonate (CALCIUM 600 PO) Take by mouth      losartan (COZAAR) 50 MG tablet Take 1 tablet by mouth daily 90 tablet 3    metoprolol tartrate (LOPRESSOR) 25 MG tablet Take 1 tablet by mouth 2 times daily 180 tablet 3    furosemide (LASIX) 40 MG tablet Take 1 tablet by mouth daily 90 tablet 3    Latanoprostene Bunod (VYZULTA) 0.024 % SOLN Apply to eye      Calcium Carbonate-Vit D-Min (CALCIUM-VITAMIN D-MINERALS) 600-800 MG-UNIT CHEW Take by mouth      Turmeric 450 MG CAPS Take by mouth      aspirin 81 MG EC tablet Take 81 mg by mouth daily      Ascorbic Acid (VITAMIN C) 500 MG CAPS Take by mouth      Cholecalciferol (VITAMIN D3) 2000 units CAPS Take by mouth 2 times daily      brimonidine (ALPHAGAN P) 0.1 % SOLN 1 drop 2 times daily      Latanoprost-Timolol Maleate 0.005-0.5 % SOLN Apply to eye       No current facility-administered medications for this visit. Allergies   Allergen Reactions    Ceclor [Cefaclor]     Ciprofloxacin Hcl     Ultram [Tramadol]      fatigue    Zocor [Simvastatin]      cough       Health Maintenance   Topic Date Due    HIV screen  Never done    Shingles Vaccine (1 of 2) Never done    Flu vaccine (1) 09/01/2021    Breast cancer screen  10/28/2021    Lipid screen  07/07/2022    Potassium monitoring  07/07/2022    Creatinine monitoring  07/07/2022    Colon cancer screen fecal DNA test (Cologuard)  10/15/2022    Diabetes screen  04/05/2024    DTaP/Tdap/Td vaccine (2 - Td or Tdap) 05/01/2028    COVID-19 Vaccine  Completed    Hepatitis C screen  Completed    Hepatitis A vaccine  Aged Out    Hepatitis B vaccine  Aged Out    Hib vaccine  Aged Out    Meningococcal (ACWY) vaccine  Aged Out    Pneumococcal 0-64 years Vaccine  Aged Out       Subjective:      Review of Systems  Review of Systems   Constitutional: Negative for chills and fever. HENT: Negative for congestion. Respiratory: Negative for cough, chest tightness and shortness of breath. Cardiovascular: Negative for chest pain, palpitations and leg swelling. Gastrointestinal: Negative for abdominal pain, anal bleeding, constipation, diarrhea and nausea. Genitourinary: Negative for difficulty urinating. Psychiatric/Behavioral: Negative. SeeHPI for visit specific review of symptoms. All others negative      Objective:   /74   Pulse 92   Temp 97 °F (36.1 °C)   Wt 263 lb (119.3 kg)   SpO2 98%   BMI 43.77 kg/m²   Physical Exam  Physical Exam   Constitutional: She appears well-developed. Does not appear ill. Neck: Normal range of motion. Neck supple. No masses. Neck Symmetric. Normal tracheal position.   No thyroid enlargement  Cardiovascular: Normal rate and regular rhythm. Exam reveals no friction rub. Carotid arteries: no bruit observed. No murmur heard. Respiratory:  Effort normal and breath sounds normal. No respiratory distress. No wheezes. No rales. No use of accessory muscles or intercostal retractions. Neurological: alert. Psychiatric: normal mood and affect. Her behavior is normal. Normal judgement and insight observed. Recent Results (from the past 672 hour(s))   Lipid Panel    Collection Time: 07/07/21  7:48 AM   Result Value Ref Range    Cholesterol, Total 231 (H) 160 - 199 mg/dL    Triglycerides 127 0 - 149 mg/dL    HDL 73 65 - 121 mg/dL    LDL Calculated 133 <100 mg/dL   CK    Collection Time: 07/07/21  7:48 AM   Result Value Ref Range    Total  26 - 192 U/L   Comprehensive Metabolic Panel    Collection Time: 07/07/21  7:48 AM   Result Value Ref Range    Sodium 142 136 - 145 mmol/L    Potassium 5.1 (H) 3.5 - 5.0 mmol/L    Chloride 106 98 - 111 mmol/L    CO2 26 22 - 29 mmol/L    Anion Gap 10 7 - 19 mmol/L    Glucose 107 74 - 109 mg/dL    BUN 12 8 - 23 mg/dL    CREATININE 0.8 0.5 - 0.9 mg/dL    GFR Non-African American >60 >60    GFR African American >59 >59    Calcium 9.7 8.8 - 10.2 mg/dL    Total Protein 7.0 6.6 - 8.7 g/dL    Albumin 4.4 3.5 - 5.2 g/dL    Total Bilirubin 0.3 0.2 - 1.2 mg/dL    Alkaline Phosphatase 65 35 - 104 U/L    ALT 20 5 - 33 U/L    AST 24 5 - 32 U/L               Assessment & Plan: The following diagnoses and conditions are stable with no further orders unless indicated:  1.  Familial hypercholesterolemia  Lab Results   Component Value Date    CHOL 231 (H) 07/07/2021    CHOL 253 (H) 04/05/2021    CHOL 235 (H) 10/05/2020     Lab Results   Component Value Date    TRIG 127 07/07/2021    TRIG 137 04/05/2021    TRIG 192 (H) 10/05/2020     Lab Results   Component Value Date    HDL 73 07/07/2021    HDL 65 04/05/2021    HDL 62 (L) 10/05/2020     Lab Results Component Value Date    LDLCALC 133 07/07/2021    LDLCALC 161 04/05/2021    [address] 135 10/05/2020     No results found for: LABVLDL, VLDL  No results found for: CHOLHDLRATIO  LDL has dropped almost 30 points. She has been unable to tolerate moderate to high intensity statins in the past.  She is only taking pravastatin 5 mg at the moment. Suggested she increase the 10 milligrams to see if she tolerates it well. - Comprehensive Metabolic Panel; Future  - Lipid Panel; Future    2. Essential (primary) hypertension  BP Readings from Last 3 Encounters:   07/13/21 126/74   04/12/21 112/78   12/15/20 130/76     Stable  - CBC Auto Differential; Future    3. Chronic midline low back pain without sciatica  Stable    4. Primary osteoarthritis of both knees  She primarily relies on Tylenol    5. Recurrent deep vein thrombosis (DVT) (HCC)  Continue with Eliquis    6. Depression with anxiety  Stable on Zoloft            Return in about 3 months (around 10/13/2021) for Routine follow up - 15 minute visit. Discussed use, benefit, and side effects of prescribed medications. All patient questions answered. Pt voiced understanding. Reviewed health maintenance. Instructedto continue current medications, diet and exercise. Patient agreed with treatmentplan. Follow up as directed. Note dictated using Dragon Dictation software  Sometimes this dictation software makes erroneous transcriptions.

## 2021-09-14 LAB
CHOLESTEROL, TOTAL: 223 MG/DL (ref 160–199)
GLUCOSE BLD-MCNC: 100 MG/DL (ref 74–109)
HDLC SERPL-MCNC: 57 MG/DL (ref 65–121)
LDL CHOLESTEROL CALCULATED: 127 MG/DL
TRIGL SERPL-MCNC: 193 MG/DL (ref 0–149)

## 2021-09-28 DIAGNOSIS — G89.29 CHRONIC MIDLINE LOW BACK PAIN WITHOUT SCIATICA: ICD-10-CM

## 2021-09-28 DIAGNOSIS — M17.0 PRIMARY OSTEOARTHRITIS OF BOTH KNEES: ICD-10-CM

## 2021-09-28 DIAGNOSIS — M54.50 CHRONIC MIDLINE LOW BACK PAIN WITHOUT SCIATICA: ICD-10-CM

## 2021-09-29 RX ORDER — HYDROXYZINE HYDROCHLORIDE 25 MG/1
TABLET, FILM COATED ORAL
Qty: 60 TABLET | Refills: 5 | Status: SHIPPED | OUTPATIENT
Start: 2021-09-29 | End: 2022-09-29

## 2021-09-29 RX ORDER — APIXABAN 5 MG/1
TABLET, FILM COATED ORAL
Qty: 60 TABLET | Refills: 2 | Status: SHIPPED | OUTPATIENT
Start: 2021-09-29 | End: 2021-12-26 | Stop reason: SDUPTHER

## 2021-09-29 RX ORDER — CYCLOBENZAPRINE HCL 10 MG
TABLET ORAL
Qty: 90 TABLET | Refills: 2 | Status: SHIPPED | OUTPATIENT
Start: 2021-09-29 | End: 2021-12-26 | Stop reason: SDUPTHER

## 2021-10-20 DIAGNOSIS — I10 ESSENTIAL (PRIMARY) HYPERTENSION: ICD-10-CM

## 2021-10-21 RX ORDER — LOSARTAN POTASSIUM 50 MG/1
TABLET ORAL
Qty: 90 TABLET | Refills: 3 | Status: SHIPPED | OUTPATIENT
Start: 2021-10-21

## 2021-10-27 DIAGNOSIS — I72.8 SPLENIC ARTERY ANEURYSM (HCC): Primary | ICD-10-CM

## 2021-11-03 DIAGNOSIS — E78.01 FAMILIAL HYPERCHOLESTEROLEMIA: ICD-10-CM

## 2021-11-04 RX ORDER — PRAVASTATIN SODIUM 10 MG
TABLET ORAL
Qty: 30 TABLET | Refills: 5 | Status: SHIPPED | OUTPATIENT
Start: 2021-11-04 | End: 2022-06-02

## 2021-11-22 ENCOUNTER — OFFICE VISIT (OUTPATIENT)
Dept: CARDIOLOGY CLINIC | Age: 66
End: 2021-11-22
Payer: COMMERCIAL

## 2021-11-22 VITALS
DIASTOLIC BLOOD PRESSURE: 70 MMHG | OXYGEN SATURATION: 99 % | SYSTOLIC BLOOD PRESSURE: 134 MMHG | HEART RATE: 82 BPM | WEIGHT: 248 LBS | BODY MASS INDEX: 41.32 KG/M2 | HEIGHT: 65 IN

## 2021-11-22 DIAGNOSIS — I10 ESSENTIAL (PRIMARY) HYPERTENSION: Primary | ICD-10-CM

## 2021-11-22 DIAGNOSIS — E78.5 HYPERLIPIDEMIA, UNSPECIFIED HYPERLIPIDEMIA TYPE: ICD-10-CM

## 2021-11-22 PROCEDURE — 99213 OFFICE O/P EST LOW 20 MIN: CPT | Performed by: NURSE PRACTITIONER

## 2021-11-22 ASSESSMENT — ENCOUNTER SYMPTOMS
WHEEZING: 0
CHEST TIGHTNESS: 0
SORE THROAT: 0
COUGH: 0
SHORTNESS OF BREATH: 0

## 2021-11-22 NOTE — PROGRESS NOTES
[facility] Cardiology   Established Patient Office Visit  [address]  [address] [address] [facility]  [phone]        OFFICE VISIT:  2021    Porfirio Mail - : 1955    Reason For Visit:  Justus Carr is a 61 y.o. female who is here for 1 Year Follow Up (No cardiac symptoms)    1. Essential (primary) hypertension    2.  Hyperlipidemia, unspecified hyperlipidemia type        Patient with a family history of coronary artery disease which includes her father having had bypass surgery and stents. Belkys Santos is a non-smoker. Mayi Chavez, she is exposed to secondhand smoke at home.     Patient with a history of hypertension and hyperlipidemia.        Patient presented to clinic on 2020 as a new patient with complaints of shortness of breath, chest pain and tachycardia.     2D echo, dobutamine stress echocardiogram and ZIO Patch were ordered.     2D echo showed:   Summary   Structurally normal mitral valve.   Structurally normal aortic valve.   Tricuspid valve is structurally normal.   Mild tricuspid regurgitation with estimated RVSP of 28 mm Hg. Normal left ventricular size with preserved LV function and an estimated ejection fraction of approximately 55-60%. Left ventricular size is normal .   Normal left ventricular wall thickness.   No regional wall motion abnormalities.      Signature      ----------------------------------------------------------------   Electronically signed by Fer Stroud MD(Interpreting   physician) on 2020 01:21 PM        Dobutamine stress echocardiogram showed   Summary   Dobutamine stress echocardiogram without clinical, electrocardiographic,   or echocardiographic evidence of myocardial ischemia.      Signature      ----------------------------------------------------------------   Electronically signed by Lex Alvarez MD(Interpreting physician)  Laura Santana 2020 09:51 PM        ZIO Patch showed:  Normal sinus rhythm with a normal heart rate of 42 bpm, maximum 136 bpm. Average 83 bpm.  There was no VT, pauses, high degree AV blocks, atrial fib nor SVT noted.         Elieser Monterroso is a 61 y.o. female with the following history as recorded in [facility]:    Patient Active Problem List    Diagnosis Date Noted    Splenic artery aneurysm ([facility] 03/06/2020    Osteoarthritis, knee     Essential (primary) hypertension 10/02/2017    Bilateral carpal tunnel syndrome 10/02/2017    Generalized anxiety disorder 10/02/2017    Familial hypercholesterolemia 10/02/2017    Chronic midline low back pain without sciatica 10/02/2017    Obstructive sleep apnea syndrome 10/02/2017    Recurrent deep vein thrombosis (DVT) ([facility] 10/02/2017    Recurrent pulmonary embolism (HCC) 10/02/2017     Current Outpatient Medications   Medication Sig Dispense Refill    metoprolol tartrate (LOPRESSOR) 25 MG tablet Take 1 tablet by mouth 2 times daily 180 tablet 3    pravastatin (PRAVACHOL) 10 MG tablet TAKE 1 TABLET BY MOUTH ONE TIME A DAY 30 tablet 5    losartan (COZAAR) 50 MG tablet TAKE 1 TABLET BY MOUTH ONE TIME A DAY 90 tablet 3    cyclobenzaprine (FLEXERIL) 10 MG tablet TAKE 1 TABLET BY MOUTH EVERY NIGHT AT BEDTIME AS NEEDED FOR MUSCLE SPASMS 90 tablet 2    ELIQUIS 5 MG TABS tablet TAKE ONE TABLET BY MOUTH TWO TIMES A DAY 60 tablet 2    hydrOXYzine (ATARAX) 25 MG tablet TAKE 1-2 TABLETS BY MOUTH TAKE TABLET BY MOUTH EVERY NIGHT AT BEDTIME AS NEEDED FOR INSOMNIA 60 tablet 5    fexofenadine (ALLEGRA ALLERGY) 180 MG tablet Take 180 mg by mouth daily      sertraline (ZOLOFT) 100 MG tablet Take 1 tablet by mouth daily 90 tablet 3    albuterol sulfate HFA (VENTOLIN HFA) 108 (90 Base) MCG/ACT inhaler Inhale 2 puffs into the lungs 4 times daily as needed for Wheezing 1 Inhaler 0    Calcium Carbonate (CALCIUM 600 PO) Take by mouth      furosemide (LASIX) 40 MG tablet Take 1 tablet by mouth daily 90 tablet 3    Latanoprostene Bunod (VYZULTA) 0.024 % SOLN Apply to eye      Calcium Carbonate-Vit D-Min (CALCIUM-VITAMIN D-MINERALS) 600-800 MG-UNIT CHEW Take by mouth      Turmeric 450 MG CAPS Take by mouth      aspirin 81 MG EC tablet Take 81 mg by mouth daily      Ascorbic Acid (VITAMIN C) 500 MG CAPS Take by mouth      Cholecalciferol (VITAMIN D3) 2000 units CAPS Take by mouth 2 times daily       No current facility-administered medications for this visit. Allergies: Ceclor [cefaclor], Ciprofloxacin hcl, Ultram [tramadol], and Zocor [simvastatin]  Past Medical History:   Diagnosis Date    Chronic midline low back pain without sciatica 10/2/2017    Essential (primary) hypertension 10/2/2017    Familial hypercholesterolemia 10/2/2017    Generalized anxiety disorder 10/2/2017    Glaucoma     Obstructive sleep apnea syndrome 10/2/2017    Osteoarthritis, knee      Past Surgical History:   Procedure Laterality Date    CHOLECYSTECTOMY      HYSTERECTOMY, TOTAL ABDOMINAL      KNEE ARTHROSCOPY      TUBAL LIGATION       Family History   Problem Relation Age of Onset    Heart Disease Mother     Heart Disease Father     Other Maternal Grandmother         aneurysm     Social History     Tobacco Use    Smoking status: Passive Smoke Exposure - Never Smoker    Smokeless tobacco: Never Used   Substance Use Topics    Alcohol use: Not Currently          Review of Systems:    Review of Systems   Constitutional: Negative for activity change, chills, diaphoresis, fatigue and fever. HENT: Negative for congestion and sore throat. Respiratory: Negative for cough, chest tightness, shortness of breath and wheezing. Cardiovascular: Negative for chest pain, palpitations and leg swelling. Neurological: Negative for dizziness, syncope, light-headedness and headaches. Psychiatric/Behavioral: Negative for confusion. The patient is not nervous/anxious.          Objective:    /70   Pulse 82   Ht 5' 5\" (1.651 m)   Wt 248 lb (112.5 kg)   SpO2 99%   BMI 41.27 kg/m²     GENERAL - well developed and well nourished, conversant  HEENT -  PERRLA, Hearing appears normal, gentleman lids are normal.  External inspection of ears and nose appear normal.  NECK - no thyromegaly, no JVD, trachea is in the midline  CARDIOVASCULAR - PMI is in the mid line clavicular position, Normal S1 and S2 with no systolic murmur. No S3 or S4    PULMONARY - no respiratory distress. No wheezes or rales. Lungs are clear to ausculation, normal respiratory effort. ABDOMEN  - soft, non tender, no rebound  MUSCULOSKELETAL  - range of motion of the upper and lower extermites appears normal and equal and is without pain   EXTREMITIES - no significant edema   NEUROLOGIC - gait and station are normal  SKIN - turgor is normal, can warm and dry. PSYCHIATRIC - normal mood and affect, alert and orientated x 3,      ASSESSMENT:    ALL THE CARDIOLOGY PROBLEMS ARE LISTED ABOVE; HOWEVER, THE FOLLOWING SPECIFIC CARDIAC PROBLEMS / CONDITIONS WERE ADDRESSED AND TREATED DURING THE OFFICE VISIT TODAY:                                                                                            MEDICAL DECISION MAKING             Cardiac Specific Problem / Diagnosis  Discussion and Data Reviewed Diagnostic Procedures Ordered Management Options Selected           1. Hypertension  Stable   Review and summation of old records:    Blood pressure 134/70. O2 sat 99%. Patient is on losartan 50 mg daily. Metoprolol tartrate 25 mg twice daily. No Continue current medications:     Yes           2. Hyperlipidemia  Stable   Patient is on Pravachol 10 mg daily. No Continue current medications: Yes                                     No orders of the defined types were placed in this encounter. Orders Placed This Encounter   Medications    metoprolol tartrate (LOPRESSOR) 25 MG tablet     Sig: Take 1 tablet by mouth 2 times daily     Dispense:  180 tablet     Refill:  3       Discussed with patient.     Return in about 1 year (around 11/22/2022) for yearly with me . I greatly appreciate the opportunity to meet Isidro Olszewski and your confidence in allowing me to participate in her cardiovascular care. SAQIB Ramirez NP  11/22/2021 2:53 PM CST                    This dictation was generated by voice recognition computer software. Although all attempts are made to edit dictation for accuracy, there may be errors in the transcription that are not intended.

## 2021-11-30 RX ORDER — SERTRALINE HYDROCHLORIDE 100 MG/1
TABLET, FILM COATED ORAL
Qty: 90 TABLET | Refills: 3 | Status: SHIPPED | OUTPATIENT
Start: 2021-11-30 | End: 2022-10-27 | Stop reason: SDUPTHER

## 2021-12-10 ENCOUNTER — HOSPITAL ENCOUNTER (OUTPATIENT)
Dept: CT IMAGING | Age: 66
Discharge: HOME | End: 2021-12-10
Payer: COMMERCIAL

## 2021-12-10 DIAGNOSIS — I72.8 SPLENIC ARTERY ANEURYSM (HCC): ICD-10-CM

## 2021-12-10 LAB
GFR AFRICAN AMERICAN: >60
GFR NON-AFRICAN AMERICAN: >60
PERFORMED ON: NORMAL
POC CREATININE: 0.7 MG/DL (ref 0.3–1.3)
POC SAMPLE TYPE: NORMAL

## 2021-12-10 PROCEDURE — 6360000004 HC RX CONTRAST MEDICATION: Performed by: NURSE PRACTITIONER

## 2021-12-10 PROCEDURE — 74174 CTA ABD&PLVS W/CONTRAST: CPT

## 2021-12-10 PROCEDURE — 82565 ASSAY OF CREATININE: CPT

## 2021-12-10 RX ADMIN — IOPAMIDOL 90 ML: 755 INJECTION, SOLUTION INTRAVENOUS at 08:35

## 2021-12-13 DIAGNOSIS — I10 ESSENTIAL (PRIMARY) HYPERTENSION: ICD-10-CM

## 2021-12-13 DIAGNOSIS — E78.01 FAMILIAL HYPERCHOLESTEROLEMIA: ICD-10-CM

## 2021-12-13 LAB
ALBUMIN SERPL-MCNC: 4.3 G/DL (ref 3.5–5.2)
ALP BLD-CCNC: 61 U/L (ref 35–104)
ALT SERPL-CCNC: 16 U/L (ref 5–33)
ANION GAP SERPL CALCULATED.3IONS-SCNC: 8 MMOL/L (ref 7–19)
AST SERPL-CCNC: 21 U/L (ref 5–32)
BASOPHILS ABSOLUTE: 0 K/UL (ref 0–0.2)
BASOPHILS RELATIVE PERCENT: 0.6 % (ref 0–1)
BILIRUB SERPL-MCNC: 0.3 MG/DL (ref 0.2–1.2)
BUN BLDV-MCNC: 13 MG/DL (ref 8–23)
CALCIUM SERPL-MCNC: 9.6 MG/DL (ref 8.8–10.2)
CHLORIDE BLD-SCNC: 105 MMOL/L (ref 98–111)
CHOLESTEROL, TOTAL: 201 MG/DL (ref 160–199)
CO2: 27 MMOL/L (ref 22–29)
CREAT SERPL-MCNC: 0.7 MG/DL (ref 0.5–0.9)
EOSINOPHILS ABSOLUTE: 0.1 K/UL (ref 0–0.6)
EOSINOPHILS RELATIVE PERCENT: 1.7 % (ref 0–5)
GFR AFRICAN AMERICAN: >59
GFR NON-AFRICAN AMERICAN: >60
GLUCOSE BLD-MCNC: 107 MG/DL (ref 74–109)
HCT VFR BLD CALC: 44.3 % (ref 37–47)
HDLC SERPL-MCNC: 58 MG/DL (ref 65–121)
HEMOGLOBIN: 14 G/DL (ref 12–16)
IMMATURE GRANULOCYTES #: 0 K/UL
LDL CHOLESTEROL CALCULATED: 103 MG/DL
LYMPHOCYTES ABSOLUTE: 2.8 K/UL (ref 1.1–4.5)
LYMPHOCYTES RELATIVE PERCENT: 39.3 % (ref 20–40)
MCH RBC QN AUTO: 29.7 PG (ref 27–31)
MCHC RBC AUTO-ENTMCNC: 31.6 G/DL (ref 33–37)
MCV RBC AUTO: 93.9 FL (ref 81–99)
MONOCYTES ABSOLUTE: 0.5 K/UL (ref 0–0.9)
MONOCYTES RELATIVE PERCENT: 7.4 % (ref 0–10)
NEUTROPHILS ABSOLUTE: 3.5 K/UL (ref 1.5–7.5)
NEUTROPHILS RELATIVE PERCENT: 50.6 % (ref 50–65)
PDW BLD-RTO: 13.4 % (ref 11.5–14.5)
PLATELET # BLD: 236 K/UL (ref 130–400)
PMV BLD AUTO: 10.1 FL (ref 9.4–12.3)
POTASSIUM SERPL-SCNC: 5 MMOL/L (ref 3.5–5)
RBC # BLD: 4.72 M/UL (ref 4.2–5.4)
SODIUM BLD-SCNC: 140 MMOL/L (ref 136–145)
TOTAL PROTEIN: 6.8 G/DL (ref 6.6–8.7)
TRIGL SERPL-MCNC: 202 MG/DL (ref 0–149)
WBC # BLD: 7 K/UL (ref 4.8–10.8)

## 2021-12-16 ENCOUNTER — VIRTUAL VISIT (OUTPATIENT)
Dept: VASCULAR SURGERY | Age: 66
End: 2021-12-16
Payer: COMMERCIAL

## 2021-12-16 DIAGNOSIS — I72.8 SPLENIC ARTERY ANEURYSM (HCC): Primary | ICD-10-CM

## 2021-12-16 PROCEDURE — 99213 OFFICE O/P EST LOW 20 MIN: CPT | Performed by: NURSE PRACTITIONER

## 2021-12-16 NOTE — PROGRESS NOTES
Frank Romero (:  1955) is a 61 y.o. female,Established patient, here for evaluation of the following chief complaint(s): Follow-up          SUBJECTIVE/OBJECTIVE:  She has a known history of splenic artery aneurysm for 1 - 5 years. She has not had abdominal pain. She has not had back pain in the cervical spine, thoracic spine, lumbar spine and sacral spine region. This pain is unchanged since the last visit. Pain is rated as 0.            Frank Romero is a 61 y.o. female with the following history as recorded in [facility]:  Patient Active Problem List    Diagnosis Date Noted    Splenic artery aneurysm ([facility] 2020    Osteoarthritis, knee     Essential (primary) hypertension 10/02/2017    Bilateral carpal tunnel syndrome 10/02/2017    Generalized anxiety disorder 10/02/2017    Familial hypercholesterolemia 10/02/2017    Chronic midline low back pain without sciatica 10/02/2017    Obstructive sleep apnea syndrome 10/02/2017    Recurrent deep vein thrombosis (DVT) ([facility] 10/02/2017    Recurrent pulmonary embolism (HCC) 10/02/2017     Current Outpatient Medications   Medication Sig Dispense Refill    sertraline (ZOLOFT) 100 MG tablet TAKE ONE TABLET ONE TIME DAILY 90 tablet 3    metoprolol tartrate (LOPRESSOR) 25 MG tablet Take 1 tablet by mouth 2 times daily 180 tablet 3    pravastatin (PRAVACHOL) 10 MG tablet TAKE 1 TABLET BY MOUTH ONE TIME A DAY 30 tablet 5    losartan (COZAAR) 50 MG tablet TAKE 1 TABLET BY MOUTH ONE TIME A DAY 90 tablet 3    cyclobenzaprine (FLEXERIL) 10 MG tablet TAKE 1 TABLET BY MOUTH EVERY NIGHT AT BEDTIME AS NEEDED FOR MUSCLE SPASMS 90 tablet 2    ELIQUIS 5 MG TABS tablet TAKE ONE TABLET BY MOUTH TWO TIMES A DAY 60 tablet 2    hydrOXYzine (ATARAX) 25 MG tablet TAKE 1-2 TABLETS BY MOUTH TAKE TABLET BY MOUTH EVERY NIGHT AT BEDTIME AS NEEDED FOR INSOMNIA 60 tablet 5    fexofenadine (ALLEGRA ALLERGY) 180 MG tablet Take 180 mg by mouth daily      albuterol sulfate HFA (VENTOLIN HFA) 108 (90 Base) MCG/ACT inhaler Inhale 2 puffs into the lungs 4 times daily as needed for Wheezing 1 Inhaler 0    Calcium Carbonate (CALCIUM 600 PO) Take by mouth      furosemide (LASIX) 40 MG tablet Take 1 tablet by mouth daily 90 tablet 3    Latanoprostene Bunod (VYZULTA) 0.024 % SOLN Apply to eye      Calcium Carbonate-Vit D-Min (CALCIUM-VITAMIN D-MINERALS) 600-800 MG-UNIT CHEW Take by mouth      Turmeric 450 MG CAPS Take by mouth      aspirin 81 MG EC tablet Take 81 mg by mouth daily      Ascorbic Acid (VITAMIN C) 500 MG CAPS Take by mouth      Cholecalciferol (VITAMIN D3) 2000 units CAPS Take by mouth 2 times daily       No current facility-administered medications for this visit. Allergies: Ceclor [cefaclor], Ciprofloxacin hcl, Ultram [tramadol], and Zocor [simvastatin]  Past Medical History:   Diagnosis Date    Chronic midline low back pain without sciatica 10/2/2017    Essential (primary) hypertension 10/2/2017    Familial hypercholesterolemia 10/2/2017    Generalized anxiety disorder 10/2/2017    Glaucoma     Obstructive sleep apnea syndrome 10/2/2017    Osteoarthritis, knee      Past Surgical History:   Procedure Laterality Date    CHOLECYSTECTOMY      HYSTERECTOMY, TOTAL ABDOMINAL      KNEE ARTHROSCOPY      TUBAL LIGATION       Family History   Problem Relation Age of Onset    Heart Disease Mother     Heart Disease Father     Other Maternal Grandmother         aneurysm     Social History     Tobacco Use    Smoking status: Passive Smoke Exposure - Never Smoker    Smokeless tobacco: Never Used   Substance Use Topics    Alcohol use: Not Currently       ROS  Eyes  no sudden vision change or amaurosis. Respiratory  no significant shortness of breath,  Cardiovascular  no chest pain or syncope.  has leg swelling. no claudication. Musculoskeletal  no gait disturbance  Skin  no new wound.   Neurologic   No speech difficulty or lateralizing weakness. All other review of systems are negative. No flowsheet data found. Physical Exam    Due to this being a TeleHealth encounter, evaluation of the following organ systems is limited: Vitals/Constitutional/EENT/Resp/CV/GI//MS/Neuro/Skin/Heme-Lymph-Imm. Constitutional  well developed, well nourished. No diaphoresis or acute distress. Neck- ROM appears normal  Extremities -No cyanosis, clubbing, has edema. No signs atheroembolic event. Pulmonary  effort appears normal.  No respiratory distress. No accessory muscle use  Neurologic  alert and oriented X 3. Cranial Nerves II-XII grossly intact  Skin  intact. No rash, erythema, or pallor. Psychiatric  mood, affect, and behavior appear normal.  Judgment and thought processes appear normal.    CT - 1. Unchanged rim calcified splenic artery aneurysm  This aneurysm has not chaned since the last visit. Individual films reviewed:  Yes. These results have been reviewed with the patient. Disease process is stable and chronic      Reviewed previous studies including: CT scan  Individual images were reviewed. I agree with the findings  Results were discussed with the patient. ASSESSMENT/PLAN:  1. Splenic artery aneurysm (HCC)          Proceed with 1 year with ct  Continue support hose for venous insuffciency  Symptoms of rupture reviewed with the patient including sudden onset severe back pain or abdominal pain. This pain can sometimes radiate into the groin or leg. The patient may experience a feeling of impending doom or death. If this occurs they have been insturcted to call 911 and get to the emergency room telling them you have an aneurysm. Patient has voiced understanding. Litzy Whiteside is a 61 y.o. female being evaluated by a Virtual Visit (video visit) encounter to address concerns as mentioned above. A caregiver was present when appropriate.  Due to this being a TeleHealth encounter (During COVID-19 public health emergency), evaluation of the following organ systems was limited: Vitals/Constitutional/EENT/Resp/CV/GI//MS/Neuro/Skin/Heme-Lymph-Imm. Pursuant to the emergency declaration under the [address] [place] [address], [address] [place] [address] authority and the [employer] Act, this Virtual Visit was conducted with patient's (and/or legal guardian's) consent, to reduce the patient's risk of exposure to COVID-19 and provide necessary medical care. The patient (and/or legal guardian) has also been advised to contact this office for worsening conditions or problems, and seek emergency medical treatment and/or call 911 if deemed necessary. Patient identification was verified at the start of the visit: Yes      Services were provided through a video synchronous discussion virtually to substitute for in-person clinic visit. Patient and provider were located at their individual homes. An electronic signature was used to authenticate this note.     --SAQIB Granda

## 2021-12-17 ENCOUNTER — OFFICE VISIT (OUTPATIENT)
Dept: FAMILY MEDICINE CLINIC | Age: 66
End: 2021-12-17
Payer: COMMERCIAL

## 2021-12-17 VITALS
TEMPERATURE: 96.4 F | HEART RATE: 80 BPM | OXYGEN SATURATION: 99 % | DIASTOLIC BLOOD PRESSURE: 76 MMHG | SYSTOLIC BLOOD PRESSURE: 122 MMHG | WEIGHT: 246 LBS | BODY MASS INDEX: 40.98 KG/M2 | HEIGHT: 65 IN

## 2021-12-17 DIAGNOSIS — E78.01 FAMILIAL HYPERCHOLESTEROLEMIA: ICD-10-CM

## 2021-12-17 DIAGNOSIS — I82.409 RECURRENT DEEP VEIN THROMBOSIS (DVT) (HCC): ICD-10-CM

## 2021-12-17 DIAGNOSIS — R53.83 OTHER FATIGUE: ICD-10-CM

## 2021-12-17 DIAGNOSIS — I10 ESSENTIAL (PRIMARY) HYPERTENSION: Primary | ICD-10-CM

## 2021-12-17 DIAGNOSIS — Z12.31 ENCOUNTER FOR SCREENING MAMMOGRAM FOR BREAST CANCER: ICD-10-CM

## 2021-12-17 DIAGNOSIS — F41.8 DEPRESSION WITH ANXIETY: ICD-10-CM

## 2021-12-17 PROCEDURE — 99214 OFFICE O/P EST MOD 30 MIN: CPT | Performed by: FAMILY MEDICINE

## 2021-12-17 NOTE — PROGRESS NOTES
[facility]  [facility]  [address] [facility] [address]  [facility] [place] [address]  Dept: [phone]  Dept Fax: [phone]  Loc: [phone]    Keshawn Courtney is a 61 y.o. female who presents today for her medical conditions/complaints as noted below. Keshawn Courtney is here for Follow-up        HPI:   CC: Here today to discuss the following:    Hypertension  Compliant with medications. No adverse effects from medication. No lightheadedness, palpitations, or chest pain. Hyperlipidemia  Tolerating current cholesterol medication without side effects. No body aches. Attempting to reduce processed sugar and cholesterol from diet. She remains on Eliquis for history of DVT. Depression with Anxiety  Compliant with medications. No adverse effects from medication. Depression and anxiety symptoms are stable today. No suicidal or homicidal ideation. Excessive worry, insomnia, and anxiousness are stable. Energy, concentration, and apathy are stable.             HPI    Past Medical History:   Diagnosis Date    Chronic midline low back pain without sciatica 10/2/2017    Essential (primary) hypertension 10/2/2017    Familial hypercholesterolemia 10/2/2017    Generalized anxiety disorder 10/2/2017    Glaucoma     Obstructive sleep apnea syndrome 10/2/2017    Osteoarthritis, knee       Past Surgical History:   Procedure Laterality Date    CHOLECYSTECTOMY      HYSTERECTOMY, TOTAL ABDOMINAL      KNEE ARTHROSCOPY      TUBAL LIGATION         Family History   Problem Relation Age of Onset    Heart Disease Mother     Heart Disease Father     Other Maternal Grandmother         aneurysm       Social History     Tobacco Use    Smoking status: Passive Smoke Exposure - Never Smoker    Smokeless tobacco: Never Used   Substance Use Topics    Alcohol use: Not Currently     Current Outpatient Medications   Medication Sig Dispense Refill    sertraline (ZOLOFT) 100 MG tablet TAKE ONE TABLET ONE TIME DAILY 90 tablet 3    metoprolol tartrate (LOPRESSOR) 25 MG tablet Take 1 tablet by mouth 2 times daily 180 tablet 3    pravastatin (PRAVACHOL) 10 MG tablet TAKE 1 TABLET BY MOUTH ONE TIME A DAY 30 tablet 5    losartan (COZAAR) 50 MG tablet TAKE 1 TABLET BY MOUTH ONE TIME A DAY 90 tablet 3    cyclobenzaprine (FLEXERIL) 10 MG tablet TAKE 1 TABLET BY MOUTH EVERY NIGHT AT BEDTIME AS NEEDED FOR MUSCLE SPASMS 90 tablet 2    ELIQUIS 5 MG TABS tablet TAKE ONE TABLET BY MOUTH TWO TIMES A DAY 60 tablet 2    hydrOXYzine (ATARAX) 25 MG tablet TAKE 1-2 TABLETS BY MOUTH TAKE TABLET BY MOUTH EVERY NIGHT AT BEDTIME AS NEEDED FOR INSOMNIA 60 tablet 5    fexofenadine (ALLEGRA ALLERGY) 180 MG tablet Take 180 mg by mouth daily      albuterol sulfate HFA (VENTOLIN HFA) 108 (90 Base) MCG/ACT inhaler Inhale 2 puffs into the lungs 4 times daily as needed for Wheezing 1 Inhaler 0    Calcium Carbonate (CALCIUM 600 PO) Take by mouth      furosemide (LASIX) 40 MG tablet Take 1 tablet by mouth daily 90 tablet 3    Latanoprostene Bunod (VYZULTA) 0.024 % SOLN Apply to eye      Calcium Carbonate-Vit D-Min (CALCIUM-VITAMIN D-MINERALS) 600-800 MG-UNIT CHEW Take by mouth      Turmeric 450 MG CAPS Take by mouth      aspirin 81 MG EC tablet Take 81 mg by mouth daily      Ascorbic Acid (VITAMIN C) 500 MG CAPS Take by mouth      Cholecalciferol (VITAMIN D3) 2000 units CAPS Take by mouth 2 times daily       No current facility-administered medications for this visit.      Allergies   Allergen Reactions    Ceclor [Cefaclor]     Ciprofloxacin Hcl     Ultram [Tramadol]      fatigue    Zocor [Simvastatin]      cough       Health Maintenance   Topic Date Due    HIV screen  Never done    Shingles Vaccine (1 of 2) Never done    COVID-19 Vaccine (3 - Booster for Moderna series) 07/20/2021    Breast cancer screen  10/28/2021    Colon cancer screen fecal DNA test (Cologuard)  10/15/2022    Lipid screen  12/13/2022  Potassium monitoring  12/13/2022    Creatinine monitoring  12/13/2022    Diabetes screen  04/05/2024    DTaP/Tdap/Td vaccine (2 - Td or Tdap) 05/01/2028    Flu vaccine  Completed    Hepatitis C screen  Completed    Hepatitis A vaccine  Aged Out    Hepatitis B vaccine  Aged Out    Hib vaccine  Aged Out    Meningococcal (ACWY) vaccine  Aged Out    Pneumococcal 0-64 years Vaccine  Aged Out       Subjective:      Review of Systems  Review of Systems   Constitutional: Negative for chills and fever. HENT: Negative for congestion. Respiratory: Negative for cough, chest tightness and shortness of breath. Cardiovascular: Negative for chest pain, palpitations and leg swelling. Gastrointestinal: Negative for abdominal pain, anal bleeding, constipation, diarrhea and nausea. Genitourinary: Negative for difficulty urinating. Psychiatric/Behavioral: Negative. SeeHPI for visit specific review of symptoms. All others negative      Objective:   /76   Pulse 80   Temp 96.4 °F (35.8 °C) (Temporal)   Ht 5' 5\" (1.651 m)   Wt 246 lb (111.6 kg)   SpO2 99%   BMI 40.94 kg/m²   Physical Exam  Physical Exam   Constitutional: She appears well-developed. Does not appear ill. Neck: Normal range of motion. Neck supple. No masses. Neck Symmetric. Normal tracheal position. No thyroid enlargement  Cardiovascular: Normal rate and regular rhythm. Exam reveals no friction rub. Carotid arteries: no bruit observed. No murmur heard. Respiratory:  Effort normal and breath sounds normal. No respiratory distress. No wheezes. No rales. No use of accessory muscles or intercostal retractions. Neurological: alert. Psychiatric: normal mood and affect. Her behavior is normal. Normal judgement and insight observed.       Recent Results (from the past 672 hour(s))   POCT Venous    Collection Time: 12/10/21  8:23 AM   Result Value Ref Range    POC Creatinine 0.7 0.3 - 1.3 mg/dL    GFR Non-African American >60 >60 GFR  >60 >60    Sample Type Unspecified     Performed on [facility]    CBC Auto Differential    Collection Time: 12/13/21  8:57 AM   Result Value Ref Range    WBC 7.0 4.8 - 10.8 K/uL    RBC 4.72 4.20 - 5.40 M/uL    Hemoglobin 14.0 12.0 - 16.0 g/dL    Hematocrit 44.3 37.0 - 47.0 %    MCV 93.9 81.0 - 99.0 fL    MCH 29.7 27.0 - 31.0 pg    MCHC 31.6 (L) 33.0 - 37.0 g/dL    RDW 13.4 11.5 - 14.5 %    Platelets 336 401 - 452 K/uL    MPV 10.1 9.4 - 12.3 fL    Neutrophils % 50.6 50.0 - 65.0 %    Lymphocytes % 39.3 20.0 - 40.0 %    Monocytes % 7.4 0.0 - 10.0 %    Eosinophils % 1.7 0.0 - 5.0 %    Basophils % 0.6 0.0 - 1.0 %    Neutrophils Absolute 3.5 1.5 - 7.5 K/uL    Immature Granulocytes # 0.0 K/uL    Lymphocytes Absolute 2.8 1.1 - 4.5 K/uL    Monocytes Absolute 0.50 0.00 - 0.90 K/uL    Eosinophils Absolute 0.10 0.00 - 0.60 K/uL    Basophils Absolute 0.00 0.00 - 0.20 K/uL   Lipid Panel    Collection Time: 12/13/21  8:57 AM   Result Value Ref Range    Cholesterol, Total 201 (H) 160 - 199 mg/dL    Triglycerides 202 (H) 0 - 149 mg/dL    HDL 58 (L) 65 - 121 mg/dL    LDL Calculated 103 <100 mg/dL   Comprehensive Metabolic Panel    Collection Time: 12/13/21  8:57 AM   Result Value Ref Range    Sodium 140 136 - 145 mmol/L    Potassium 5.0 3.5 - 5.0 mmol/L    Chloride 105 98 - 111 mmol/L    CO2 27 22 - 29 mmol/L    Anion Gap 8 7 - 19 mmol/L    Glucose 107 74 - 109 mg/dL    BUN 13 8 - 23 mg/dL    CREATININE 0.7 0.5 - 0.9 mg/dL    GFR Non-African American >60 >60    GFR African American >59 >59    Calcium 9.6 8.8 - 10.2 mg/dL    Total Protein 6.8 6.6 - 8.7 g/dL    Albumin 4.3 3.5 - 5.2 g/dL    Total Bilirubin 0.3 0.2 - 1.2 mg/dL    Alkaline Phosphatase 61 35 - 104 U/L    ALT 16 5 - 33 U/L    AST 21 5 - 32 U/L       Lab Results   Component Value Date    CHOL 201 (H) 12/13/2021    CHOL 223 (H) 09/14/2021    CHOL 231 (H) 07/07/2021     Lab Results   Component Value Date    TRIG 202 (H) 12/13/2021    TRIG 193 (H) 09/14/2021

## 2021-12-17 NOTE — PATIENT INSTRUCTIONS
What Everyone Should Know about Shingles Vaccine (Shingrix)    CONTACT YOUR INSURANCE TO SEE IF YOUR POLICY COVERS THIS VACCINE    One of the Recommended Vaccines  Shingles vaccination is the only way to protect against shingles and postherpetic neuralgia (PHN), the most common complication from shingles. CDC recommends that healthy adults 50 years and older get two doses of the shingles vaccine called Shingrix®,  by 2 to 6 months, to prevent shingles and the complications from the disease. Your doctor or pharmacist can give you Shingrix as a shot in your upper arm. Shingrix provides strong protection against shingles and PHN. Two doses of Shingrix is more than 90% effective at preventing shingles and PHN. Protection stays above 85% for at least the first four years after you get vaccinated. Shingrix is the preferred vaccine, over Zostavax®, a shingles vaccine in use since 2006. Who Should Get Shingrix? Healthy adults 50 years and older should get two doses of Shingrix,  by 2 to 6 months. You should get Shingrix even if in the past you   had shingles   received Zostavax   are not sure if you had chickenpox  Vaccine for Those 50 Years and Older  Shingrix reduces the risk of shingles and PHN by more than 90% in people 48 and older. CDC recommends the vaccine for healthy adults 48 and older. There is no maximum age for getting Shingrix. If you had shingles in the past, you can get Shingrix to help prevent future occurrences of the disease. There is no specific length of time that you need to wait after having shingles before you can receive Shingrix, but generally you should make sure the shingles rash has gone away before getting vaccinated. You can get Shingrix whether or not you remember having had chickenpox in the past. Studies show that more than 99% of Americans 40 years and older have had chickenpox, even if they dont remember having the disease.  Chickenpox and shingles are related because they are caused by the same virus (varicella zoster virus). After a person recovers from chickenpox, the virus stays dormant (inactive) in the body. It can reactivate years later and cause shingles. If you had Zostavax in the recent past, you should wait at least eight weeks before getting Shingrix. Talk to your healthcare provider to determine the best time to get Shingrix. Shingrix is available in [place] [place] and pharmacies. If you have questions about Shingrix, talk with your healthcare provider. Who Should Not Get Shingrix? The side effects of the Shingrix are temporary, and usually last 2 to 3 days. While you may experience pain for a few days after getting Shingrix, the pain will be less severe than having shingles and the complications from the disease. You should not get Shingrix if you:   have ever had a severe allergic reaction to any component of the vaccine or after a dose of Shingrix   tested negative for immunity to varicella zoster virus. If you test negative, you should get chickenpox vaccine.  currently have shingles   currently are pregnant or breastfeeding. Women who are pregnant or breastfeeding should wait to get Shingrix. If you have a minor acute (starts suddenly) illness, such as a cold, you may get Shingrix. But if you have a moderate or severe acute illness, you should usually wait until you recover before getting the vaccine. This includes anyone with a temperature of 101.3°F or higher. How Well Does Shingrix Work? Two doses of Shingrix provides strong protection against shingles and postherpetic neuralgia (PHN), the most common complication of shingles.  In adults 48to 71years old who got two doses, Shingrix was 97% effective in preventing shingles; among adults 70 years and older, Shingrix was 91% effective.    In adults 48to 71years old who got two doses, Shingrix was 91% effective in preventing PHN; among adults 70 years and older, Shingrix was 89% [phone]. If you have any questions about side effects from Shingrix, talk with your doctor. The shingles vaccine does not contain thimerosal (a preservative containing mercury). How Can I Pay For Shingrix? There are several ways shingles vaccine may be paid for:   Medicare   Medicare Part D plans cover the shingles vaccine, but there may be a cost to you depending on your plan. There may be a copay for the vaccine, or you may need to pay in full then get reimbursed for a certain amount.  Medicare Part B does not cover the shingles vaccine. Medicaid   Medicaid may or may not cover the vaccine. Contact your insurer to find out. Private health insurance   Many private health insurance plans will cover the vaccine. Contact your insurer to find out. Vaccine assistance programs   Some pharmaceutical companies provide vaccines to eligible adults who cannot afford them. You may want to check with the vaccine , [employer], about Shingrix.

## 2021-12-26 DIAGNOSIS — G89.29 CHRONIC MIDLINE LOW BACK PAIN WITHOUT SCIATICA: ICD-10-CM

## 2021-12-26 DIAGNOSIS — M17.0 PRIMARY OSTEOARTHRITIS OF BOTH KNEES: ICD-10-CM

## 2021-12-26 DIAGNOSIS — M54.50 CHRONIC MIDLINE LOW BACK PAIN WITHOUT SCIATICA: ICD-10-CM

## 2021-12-26 RX ORDER — CYCLOBENZAPRINE HCL 10 MG
TABLET ORAL
Qty: 90 TABLET | Refills: 2 | Status: SHIPPED | OUTPATIENT
Start: 2021-12-26

## 2021-12-30 ENCOUNTER — HOSPITAL ENCOUNTER (OUTPATIENT)
Dept: WOMENS IMAGING | Age: 66
Discharge: HOME | End: 2021-12-30
Payer: COMMERCIAL

## 2021-12-30 DIAGNOSIS — Z12.31 ENCOUNTER FOR SCREENING MAMMOGRAM FOR BREAST CANCER: ICD-10-CM

## 2021-12-30 PROCEDURE — 77063 BREAST TOMOSYNTHESIS BI: CPT

## 2022-04-04 NOTE — TELEPHONE ENCOUNTER
Anny Wolff called to request a refill on her medication.       Last office visit : 12/17/2021   Next office visit : 6/17/2022     Requested Prescriptions     Signed Prescriptions Disp Refills    apixaban (ELIQUIS) 5 MG TABS tablet 60 tablet 2     Sig: Take 1 tablet by mouth 2 times daily     Authorizing Provider: Stella Gamboa     Ordering User: Etienne Barnard

## 2022-06-02 DIAGNOSIS — E78.01 FAMILIAL HYPERCHOLESTEROLEMIA: ICD-10-CM

## 2022-06-02 RX ORDER — PRAVASTATIN SODIUM 10 MG
TABLET ORAL
Qty: 90 TABLET | Refills: 3 | Status: SHIPPED | OUTPATIENT
Start: 2022-06-02

## 2022-07-13 RX ORDER — APIXABAN 5 MG/1
TABLET, FILM COATED ORAL
Qty: 60 TABLET | Refills: 2 | Status: SHIPPED | OUTPATIENT
Start: 2022-07-13 | End: 2022-10-13

## 2022-08-03 ENCOUNTER — OFFICE VISIT (OUTPATIENT)
Dept: FAMILY MEDICINE CLINIC | Age: 67
End: 2022-08-03
Payer: COMMERCIAL

## 2022-08-03 VITALS
HEIGHT: 65 IN | TEMPERATURE: 98.2 F | OXYGEN SATURATION: 98 % | HEART RATE: 82 BPM | BODY MASS INDEX: 43.45 KG/M2 | SYSTOLIC BLOOD PRESSURE: 138 MMHG | WEIGHT: 260.8 LBS | DIASTOLIC BLOOD PRESSURE: 82 MMHG

## 2022-08-03 DIAGNOSIS — L03.115 CELLULITIS OF RIGHT LOWER EXTREMITY: Primary | ICD-10-CM

## 2022-08-03 DIAGNOSIS — T63.301A SPIDER BITE WOUND, ACCIDENTAL OR UNINTENTIONAL, INITIAL ENCOUNTER: ICD-10-CM

## 2022-08-03 PROCEDURE — 99213 OFFICE O/P EST LOW 20 MIN: CPT | Performed by: NURSE PRACTITIONER

## 2022-08-03 RX ORDER — SULFAMETHOXAZOLE AND TRIMETHOPRIM 800; 160 MG/1; MG/1
1 TABLET ORAL 2 TIMES DAILY
Qty: 20 TABLET | Refills: 0 | Status: SHIPPED | OUTPATIENT
Start: 2022-08-03 | End: 2022-08-13

## 2022-08-03 ASSESSMENT — PATIENT HEALTH QUESTIONNAIRE - PHQ9
10. IF YOU CHECKED OFF ANY PROBLEMS, HOW DIFFICULT HAVE THESE PROBLEMS MADE IT FOR YOU TO DO YOUR WORK, TAKE CARE OF THINGS AT HOME, OR GET ALONG WITH OTHER PEOPLE: 0
3. TROUBLE FALLING OR STAYING ASLEEP: 0
4. FEELING TIRED OR HAVING LITTLE ENERGY: 0
SUM OF ALL RESPONSES TO PHQ QUESTIONS 1-9: 3
SUM OF ALL RESPONSES TO PHQ QUESTIONS 1-9: 3
5. POOR APPETITE OR OVEREATING: 3
SUM OF ALL RESPONSES TO PHQ9 QUESTIONS 1 & 2: 0
SUM OF ALL RESPONSES TO PHQ QUESTIONS 1-9: 3
6. FEELING BAD ABOUT YOURSELF - OR THAT YOU ARE A FAILURE OR HAVE LET YOURSELF OR YOUR FAMILY DOWN: 0
9. THOUGHTS THAT YOU WOULD BE BETTER OFF DEAD, OR OF HURTING YOURSELF: 0
2. FEELING DOWN, DEPRESSED OR HOPELESS: 0
1. LITTLE INTEREST OR PLEASURE IN DOING THINGS: 0
8. MOVING OR SPEAKING SO SLOWLY THAT OTHER PEOPLE COULD HAVE NOTICED. OR THE OPPOSITE, BEING SO FIGETY OR RESTLESS THAT YOU HAVE BEEN MOVING AROUND A LOT MORE THAN USUAL: 0
7. TROUBLE CONCENTRATING ON THINGS, SUCH AS READING THE NEWSPAPER OR WATCHING TELEVISION: 0
SUM OF ALL RESPONSES TO PHQ QUESTIONS 1-9: 3

## 2022-08-03 ASSESSMENT — ENCOUNTER SYMPTOMS
GASTROINTESTINAL NEGATIVE: 1
RESPIRATORY NEGATIVE: 1
EYES NEGATIVE: 1
ALLERGIC/IMMUNOLOGIC NEGATIVE: 1

## 2022-08-03 NOTE — PROGRESS NOTES
Formerly [facility]  [facility]  [address] [address]  Dept: [phone]  Dept Fax: [phone]  Loc: [phone]    Remi Lew is a 61 y.o. female who presents today for her medical conditions/complaints as noted below. Remi Lew is c/o of Insect Bite (Was bitten by a spider 6 weeks ago)        HPI:     HPI   Chief Complaint   Patient presents with    Insect Bite     Was bitten by a spider 6 weeks ago   She presents with a spider bite to her right inner ankle area that she has had for the past six weeks. She has been using triple antibiotic oint, tea tree oil and wiping. She states she has been trying not to scrub it.    Past Medical History:   Diagnosis Date    Chronic midline low back pain without sciatica 10/2/2017    Essential (primary) hypertension 10/2/2017    Familial hypercholesterolemia 10/2/2017    Generalized anxiety disorder 10/2/2017    Glaucoma     Obstructive sleep apnea syndrome 10/2/2017    Osteoarthritis, knee       Past Surgical History:   Procedure Laterality Date    CHOLECYSTECTOMY      HYSTERECTOMY, TOTAL ABDOMINAL (CERVIX REMOVED)      KNEE ARTHROSCOPY      TUBAL LIGATION         Vitals 8/3/2022 12/17/2021 11/22/2021 7/13/2021 5/10/2021 5/70/8705   SYSTOLIC 963 862 272 914 - 127   DIASTOLIC 82 76 70 74 - 78   Site - - - - - -   Pulse 82 80 82 92 - 93   Temp 98.2 96.4 - 97 - 97   Resp - - - - - -   SpO2 98 99 99 98 - 96   Weight 260 lb 12.8 oz 246 lb 248 lb 263 lb 225 lb 262 lb   Height 5' 5\" 5' 5\" 5' 5\" - 5' 5\" -   Body mass index 43.39 kg/m2 40.93 kg/m2 41.26 kg/m2 - 37.44 kg/m2 -   Pain Level - - - - - -   Some recent data might be hidden       Family History   Problem Relation Age of Onset    Heart Disease Mother     Heart Disease Father     Other Maternal Grandmother         aneurysm       Social History     Tobacco Use    Smoking status: Never     Passive exposure: Yes    Smokeless tobacco: Never   Substance Use Topics    Alcohol use: Not Currently      Current Outpatient Medications on File Prior to Visit   Medication Sig Dispense Refill    ELIQUIS 5 MG TABS tablet TAKE 1 TABLET BY MOUTH TWICE DAILY 60 tablet 2    pravastatin (PRAVACHOL) 10 MG tablet TAKE 1 TABLET BY MOUTH EVERY DAY 90 tablet 3    cyclobenzaprine (FLEXERIL) 10 MG tablet TAKE 1 TABLET BY MOUTH EVERY NIGHT AT BEDTIME AS NEEDED FOR MUSCLE SPASMS 90 tablet 2    sertraline (ZOLOFT) 100 MG tablet TAKE ONE TABLET ONE TIME DAILY 90 tablet 3    metoprolol tartrate (LOPRESSOR) 25 MG tablet Take 1 tablet by mouth 2 times daily 180 tablet 3    losartan (COZAAR) 50 MG tablet TAKE 1 TABLET BY MOUTH ONE TIME A DAY 90 tablet 3    hydrOXYzine (ATARAX) 25 MG tablet TAKE 1-2 TABLETS BY MOUTH TAKE TABLET BY MOUTH EVERY NIGHT AT BEDTIME AS NEEDED FOR INSOMNIA 60 tablet 5    fexofenadine (ALLEGRA) 180 MG tablet Take 180 mg by mouth daily      albuterol sulfate HFA (VENTOLIN HFA) 108 (90 Base) MCG/ACT inhaler Inhale 2 puffs into the lungs 4 times daily as needed for Wheezing 1 Inhaler 0    Latanoprostene Bunod 0.024 % SOLN Apply to eye      Turmeric 450 MG CAPS Take by mouth      aspirin 81 MG EC tablet Take 81 mg by mouth daily      Ascorbic Acid (VITAMIN C) 500 MG CAPS Take by mouth      Cholecalciferol (VITAMIN D3) 2000 units CAPS Take by mouth 2 times daily      Calcium Carbonate (CALCIUM 600 PO) Take by mouth      furosemide (LASIX) 40 MG tablet Take 1 tablet by mouth daily 90 tablet 3    Calcium Carbonate-Vit D-Min (CALCIUM-VITAMIN D-MINERALS) 600-800 MG-UNIT CHEW Take by mouth       No current facility-administered medications on file prior to visit.      Allergies   Allergen Reactions    Ceclor [Cefaclor]     Ciprofloxacin Hcl     Ultram [Tramadol]      fatigue    Zocor [Simvastatin]      cough       Health Maintenance   Topic Date Due    HIV screen  Never done    Shingles vaccine (1 of 2) Never done    COVID-19 Vaccine (4 - Booster for Moderna series) 03/19/2022 ASSESSMENT/PLAN:  1. Cellulitis of right lower extremity  2. Spider bite wound, accidental or unintentional, initial encounter  Advised to elevate extremity  Cleanse twice daily with antibacterial soap and water, apply antibiotic ointment. Start Bactrim. No follow-ups on file. More than 50% of the time was spent counseling and coordinating care for a total time of 15-20min face to face. Follow up in 2 weeks if no improvement. PDMP Monitoring:    Last PDMP Elnor Power as Reviewed [facility]):  Review User Review Instant Review Result            Urine Drug Screenings (1 yr)       POCT Rapid Drug Screen  Collected: 11/6/2018  3:00 PM (Final result)                  Medication Contract and Consent for Opioid Use Documents Filed       Patient Documents       Type of Document Status Date Received Received By Description    Medication Contract Received 5/1/2018  4:46 PM Delfina Stone signed 5/1/18                     Patient given educational materials -see patient instructions. Discussed use, benefit, and side effects of prescribed medications. All patient questions answered. Pt voiced understanding. Reviewed health maintenance. Instructed to continue currentmedications, diet and exercise. Patient agreed with treatment plan. Follow up as directed. MEDICATIONS:  Orders Placed This Encounter   Medications    sulfamethoxazole-trimethoprim (BACTRIM DS) 800-160 MG per tablet     Sig: Take 1 tablet by mouth in the morning and 1 tablet before bedtime. Do all this for 10 days. Dispense:  20 tablet     Refill:  0    mupirocin (BACTROBAN) 2 % ointment     Sig: Apply topically 2 times daily x 5-7 days     Dispense:  30 g     Refill:  0         ORDERS:  No orders of the defined types were placed in this encounter. Follow-up:  No follow-ups on file. PATIENT INSTRUCTIONS:  There are no Patient Instructions on file for this visit.   Electronically signed by SAQIB Zee on 8/3/2022 at 1:15 PM    EMR Dragon/transcription disclaimer:  Much of thisencounter note is electronic transcription/translation of spoken language to printed texts. The electronic translation of spoken language may be erroneous, or at times, nonsensical words or phrases may be inadvertentlytranscribed.   Although I have reviewed the note for such errors, some may still exist.

## 2022-08-12 ENCOUNTER — OFFICE VISIT (OUTPATIENT)
Age: 67
End: 2022-08-12
Payer: COMMERCIAL

## 2022-08-12 ENCOUNTER — TELEPHONE (OUTPATIENT)
Dept: FAMILY MEDICINE CLINIC | Age: 67
End: 2022-08-12

## 2022-08-12 VITALS
SYSTOLIC BLOOD PRESSURE: 138 MMHG | WEIGHT: 262 LBS | OXYGEN SATURATION: 97 % | DIASTOLIC BLOOD PRESSURE: 84 MMHG | TEMPERATURE: 98.4 F | HEIGHT: 65 IN | BODY MASS INDEX: 43.65 KG/M2 | HEART RATE: 63 BPM | RESPIRATION RATE: 14 BRPM

## 2022-08-12 DIAGNOSIS — L03.115 CELLULITIS OF RIGHT LOWER EXTREMITY: Primary | ICD-10-CM

## 2022-08-12 PROCEDURE — 99213 OFFICE O/P EST LOW 20 MIN: CPT | Performed by: PHYSICIAN ASSISTANT

## 2022-08-12 RX ORDER — CLINDAMYCIN HYDROCHLORIDE 150 MG/1
450 CAPSULE ORAL 3 TIMES DAILY
Qty: 63 CAPSULE | Refills: 0 | Status: SHIPPED | OUTPATIENT
Start: 2022-08-12 | End: 2022-08-19

## 2022-08-12 ASSESSMENT — ENCOUNTER SYMPTOMS
SINUS PAIN: 0
NAUSEA: 0
SHORTNESS OF BREATH: 0
ABDOMINAL PAIN: 0
SINUS PRESSURE: 0
EYE PAIN: 0
COUGH: 0
DIARRHEA: 0
ALLERGIC/IMMUNOLOGIC NEGATIVE: 1
SORE THROAT: 0
VOMITING: 0

## 2022-08-12 NOTE — TELEPHONE ENCOUNTER
The patient called and said she seen Lynnette Ahuja a week ago for a spider bite and it hasn't gotten any better. The patient wanted to make a follow appointment with Lynnette Ahuja next week. I made the appointment for the patient but after speaking with some of my coworkers we decided that if the spider bite was getting worse she needed to be seen at Urgent Care. I called the patient back and let her know that we felt she needed to be seen at an Urgent Care before Monday just to be safe. She voiced understanding.

## 2022-08-12 NOTE — PROGRESS NOTES
[address]  [address] [address] [facility] [place] [address] [address]  Dept: [phone]  Dept Fax: [phone]  Loc: [phone]    Doreen Jaime is a 61 y.o. female who presents today for her medical conditions/complaints as noted below. Doreen Jaime is complaining of Insect Bite (Spider bite on right inside foot and just finished a 10 day antibiotic and not looking any better)    HPI:   Patient presents with wound on right medial malleolus that has not improved on Bactrim last dose is tonight. Patient has venous stasis and poor circulation. patient states she has been applying Bactroban ointment and keeping her leg elevated. She denies fever, chills, body aches, nausea or fatigue. Patient admits to pain intermittently to area of the wound. Past Medical History:   Diagnosis Date    Chronic midline low back pain without sciatica 10/2/2017    Essential (primary) hypertension 10/2/2017    Familial hypercholesterolemia 10/2/2017    Generalized anxiety disorder 10/2/2017    Glaucoma     Obstructive sleep apnea syndrome 10/2/2017    Osteoarthritis, knee        Past Surgical History:   Procedure Laterality Date    CHOLECYSTECTOMY      HYSTERECTOMY, TOTAL ABDOMINAL (CERVIX REMOVED)      KNEE ARTHROSCOPY      TUBAL LIGATION         Family History   Problem Relation Age of Onset    Heart Disease Mother     Heart Disease Father     Other Maternal Grandmother         aneurysm       Social History     Tobacco Use    Smoking status: Never     Passive exposure: Yes    Smokeless tobacco: Never   Substance Use Topics    Alcohol use: Not Currently        Current Outpatient Medications   Medication Sig Dispense Refill    clindamycin (CLEOCIN) 150 MG capsule Take 3 capsules by mouth in the morning and 3 capsules at noon and 3 capsules before bedtime. Do all this for 7 days.  63 capsule 0    sulfamethoxazole-trimethoprim (BACTRIM DS) 800-160 MG per tablet Take 1 tablet by mouth in the morning and 1 tablet before bedtime. Do all this for 10 days. 20 tablet 0    mupirocin (BACTROBAN) 2 % ointment Apply topically 2 times daily x 5-7 days 30 g 0    ELIQUIS 5 MG TABS tablet TAKE 1 TABLET BY MOUTH TWICE DAILY 60 tablet 2    pravastatin (PRAVACHOL) 10 MG tablet TAKE 1 TABLET BY MOUTH EVERY DAY 90 tablet 3    cyclobenzaprine (FLEXERIL) 10 MG tablet TAKE 1 TABLET BY MOUTH EVERY NIGHT AT BEDTIME AS NEEDED FOR MUSCLE SPASMS 90 tablet 2    sertraline (ZOLOFT) 100 MG tablet TAKE ONE TABLET ONE TIME DAILY 90 tablet 3    metoprolol tartrate (LOPRESSOR) 25 MG tablet Take 1 tablet by mouth 2 times daily 180 tablet 3    losartan (COZAAR) 50 MG tablet TAKE 1 TABLET BY MOUTH ONE TIME A DAY 90 tablet 3    hydrOXYzine (ATARAX) 25 MG tablet TAKE 1-2 TABLETS BY MOUTH TAKE TABLET BY MOUTH EVERY NIGHT AT BEDTIME AS NEEDED FOR INSOMNIA 60 tablet 5    fexofenadine (ALLEGRA) 180 MG tablet Take 180 mg by mouth daily      albuterol sulfate HFA (VENTOLIN HFA) 108 (90 Base) MCG/ACT inhaler Inhale 2 puffs into the lungs 4 times daily as needed for Wheezing 1 Inhaler 0    Latanoprostene Bunod 0.024 % SOLN Apply to eye      Turmeric 450 MG CAPS Take by mouth      aspirin 81 MG EC tablet Take 81 mg by mouth daily      Ascorbic Acid (VITAMIN C) 500 MG CAPS Take by mouth      Cholecalciferol (VITAMIN D3) 2000 units CAPS Take by mouth 2 times daily       No current facility-administered medications for this visit.        Allergies   Allergen Reactions    Ceclor [Cefaclor]     Ciprofloxacin Hcl     Ultram [Tramadol]      fatigue    Zocor [Simvastatin]      cough       Health Maintenance   Topic Date Due    HIV screen  Never done    Shingles vaccine (1 of 2) Never done    COVID-19 Vaccine (4 - Booster for Moderna series) 03/19/2022    Flu vaccine (1) 09/01/2022    Colorectal Cancer Screen  10/15/2022    Lipids  12/13/2022    Breast cancer screen  12/30/2022    Depression Monitoring  08/03/2023    Diabetes screen 04/05/2024    DTaP/Tdap/Td vaccine (2 - Td or Tdap) 05/01/2028    Hepatitis C screen  Completed    Hepatitis A vaccine  Aged Out    Hepatitis B vaccine  Aged Out    Hib vaccine  Aged Out    Meningococcal (ACWY) vaccine  Aged Out    Pneumococcal 0-64 years Vaccine  Aged Out       Subjective:   Review of Systems   Constitutional:  Negative for chills, fatigue and fever. HENT:  Negative for congestion, postnasal drip, sinus pressure, sinus pain and sore throat. Eyes:  Negative for pain and visual disturbance. Respiratory:  Negative for cough and shortness of breath. Cardiovascular:  Negative for chest pain. Gastrointestinal:  Negative for abdominal pain, diarrhea, nausea and vomiting. Endocrine: Negative for cold intolerance and heat intolerance. Genitourinary:  Negative for frequency, hematuria and urgency. Musculoskeletal:  Negative for myalgias. Skin:  Positive for wound. Negative for rash. Allergic/Immunologic: Negative. Neurological:  Negative for syncope, weakness, light-headedness and headaches. Hematological: Negative. Psychiatric/Behavioral: Negative. Objective    Physical Exam  Constitutional:       General: She is not in acute distress. Appearance: Normal appearance. HENT:      Head: Normocephalic and atraumatic. Right Ear: External ear normal.      Left Ear: External ear normal.      Nose: Nose normal.      Mouth/Throat:      Mouth: Mucous membranes are moist.      Pharynx: Oropharynx is clear. Eyes:      Extraocular Movements: Extraocular movements intact. Conjunctiva/sclera: Conjunctivae normal.   Cardiovascular:      Rate and Rhythm: Normal rate and regular rhythm. Pulses: Normal pulses. Heart sounds: Normal heart sounds. Pulmonary:      Effort: Pulmonary effort is normal.      Breath sounds: Normal breath sounds. No wheezing. Abdominal:      General: Abdomen is flat. Bowel sounds are normal. There is no distension.       Palpations: Abdomen is soft. Tenderness: There is no abdominal tenderness. Musculoskeletal:         General: Normal range of motion. Cervical back: Normal range of motion and neck supple. No tenderness. Feet:       Comments: Wound with healthy tissue, with mild surrounding erythema. Pt states it has gotten bigger while on bactrim. Skin:     General: Skin is warm and dry. Findings: Erythema present. Neurological:      General: No focal deficit present. Mental Status: She is alert and oriented to person, place, and time. Psychiatric:         Mood and Affect: Mood normal.         Behavior: Behavior normal.       /84   Pulse 63   Temp 98.4 °F (36.9 °C)   Resp 14   Ht 5' 5\" (1.651 m)   Wt 262 lb (118.8 kg)   SpO2 97%   BMI 43.60 kg/m²     Assessment         Diagnosis Orders   1. Cellulitis of right lower extremity  Culture, Aerobic and Anaerobic    Bria Jackson ARNP, Wound Care, [place]    clindamycin (CLEOCIN) 150 MG capsule          Plan     Wound culture- will call with results   Wound care referral placed. Complete full course of antibiotics and continue Bactroban ointment. Please follow up with PCP or return to clinic if wound continues to grow larger, you spike a fever or have increased pain. Patient verbalizes understanding and agrees with treatment. Orders Placed This Encounter   Procedures    Culture, Aerobic and Anaerobic     Right medial malleolus    MELLY Messina, Wound Care, [place]     Referral Priority:   Routine     Referral Type:   Eval and Treat     Referral Reason:   Specialty Services Required     Requested Specialty:   Wound Care     Number of Visits Requested:   1         No results found for this visit on 08/12/22. Orders Placed This Encounter   Medications    clindamycin (CLEOCIN) 150 MG capsule     Sig: Take 3 capsules by mouth in the morning and 3 capsules at noon and 3 capsules before bedtime. Do all this for 7 days. Dispense:  63 capsule     Refill:  0        New Prescriptions    CLINDAMYCIN (CLEOCIN) 150 MG CAPSULE    Take 3 capsules by mouth in the morning and 3 capsules at noon and 3 capsules before bedtime. Do all this for 7 days. No follow-ups on file. Discussed use, benefits, and side effects of any prescribed medications. All patient questions were answered. Patient voiced understanding of care plan. Patient was given educational materials - see patient instructions below. There are no Patient Instructions on file for this visit.       Electronically signed by David Hurtado PA-C on 8/12/2022 at 7:20 PM

## 2022-08-13 NOTE — PATIENT INSTRUCTIONS
Wound culture- will call with results   Wound care referral placed. Complete full course of antibiotics and continue Bactroban ointment. Please follow up with PCP or return to clinic if wound continues to grow larger, you spike a fever or have increased pain. Patient verbalizes understanding and agrees with treatment.

## 2022-08-15 ENCOUNTER — HOSPITAL ENCOUNTER (OUTPATIENT)
Dept: WOUND CARE | Age: 67
Discharge: HOME | End: 2022-08-15
Payer: COMMERCIAL

## 2022-08-15 VITALS
BODY MASS INDEX: 43.65 KG/M2 | RESPIRATION RATE: 18 BRPM | HEIGHT: 65 IN | SYSTOLIC BLOOD PRESSURE: 147 MMHG | WEIGHT: 262 LBS | HEART RATE: 87 BPM | DIASTOLIC BLOOD PRESSURE: 84 MMHG | TEMPERATURE: 98.4 F

## 2022-08-15 DIAGNOSIS — E66.01 CLASS 3 SEVERE OBESITY DUE TO EXCESS CALORIES WITH SERIOUS COMORBIDITY AND BODY MASS INDEX (BMI) OF 40.0 TO 44.9 IN ADULT (HCC): ICD-10-CM

## 2022-08-15 DIAGNOSIS — I73.9 PVD (PERIPHERAL VASCULAR DISEASE) (HCC): ICD-10-CM

## 2022-08-15 DIAGNOSIS — L97.312 SKIN ULCER OF RIGHT ANKLE WITH FAT LAYER EXPOSED (HCC): ICD-10-CM

## 2022-08-15 PROBLEM — E66.813 CLASS 3 SEVERE OBESITY DUE TO EXCESS CALORIES WITH SERIOUS COMORBIDITY AND BODY MASS INDEX (BMI) OF 40.0 TO 44.9 IN ADULT: Status: ACTIVE | Noted: 2022-08-15

## 2022-08-15 PROCEDURE — 97597 DBRDMT OPN WND 1ST 20 CM/<: CPT | Performed by: NURSE PRACTITIONER

## 2022-08-15 PROCEDURE — 99215 OFFICE O/P EST HI 40 MIN: CPT | Performed by: NURSE PRACTITIONER

## 2022-08-15 PROCEDURE — 97597 DBRDMT OPN WND 1ST 20 CM/<: CPT

## 2022-08-15 PROCEDURE — 99213 OFFICE O/P EST LOW 20 MIN: CPT

## 2022-08-15 RX ORDER — LIDOCAINE 50 MG/G
OINTMENT TOPICAL ONCE
Status: CANCELLED | OUTPATIENT
Start: 2022-08-15 | End: 2022-08-15

## 2022-08-15 RX ORDER — LIDOCAINE HYDROCHLORIDE 20 MG/ML
JELLY TOPICAL ONCE
Status: CANCELLED | OUTPATIENT
Start: 2022-08-15 | End: 2022-08-15

## 2022-08-15 RX ORDER — CLOBETASOL PROPIONATE 0.5 MG/G
OINTMENT TOPICAL ONCE
Status: CANCELLED | OUTPATIENT
Start: 2022-08-15 | End: 2022-08-15

## 2022-08-15 RX ORDER — PREDNISONE 10 MG/1
TABLET ORAL
Qty: 10 TABLET | Refills: 0 | Status: SHIPPED | OUTPATIENT
Start: 2022-08-15 | End: 2022-08-26 | Stop reason: ALTCHOICE

## 2022-08-15 RX ORDER — LIDOCAINE HYDROCHLORIDE 20 MG/ML
JELLY TOPICAL PRN
Status: DISCONTINUED | OUTPATIENT
Start: 2022-08-15 | End: 2022-08-17 | Stop reason: HOSPADM

## 2022-08-15 RX ORDER — BACITRACIN, NEOMYCIN, POLYMYXIN B 400; 3.5; 5 [USP'U]/G; MG/G; [USP'U]/G
OINTMENT TOPICAL ONCE
Status: CANCELLED | OUTPATIENT
Start: 2022-08-15 | End: 2022-08-15

## 2022-08-15 RX ORDER — GENTAMICIN SULFATE 1 MG/G
OINTMENT TOPICAL ONCE
Status: CANCELLED | OUTPATIENT
Start: 2022-08-15 | End: 2022-08-15

## 2022-08-15 RX ORDER — BETAMETHASONE DIPROPIONATE 0.05 %
OINTMENT (GRAM) TOPICAL ONCE
Status: CANCELLED | OUTPATIENT
Start: 2022-08-15 | End: 2022-08-15

## 2022-08-15 RX ORDER — BACITRACIN ZINC AND POLYMYXIN B SULFATE 500; 1000 [USP'U]/G; [USP'U]/G
OINTMENT TOPICAL ONCE
Status: CANCELLED | OUTPATIENT
Start: 2022-08-15 | End: 2022-08-15

## 2022-08-15 RX ORDER — LIDOCAINE 40 MG/G
CREAM TOPICAL ONCE
Status: CANCELLED | OUTPATIENT
Start: 2022-08-15 | End: 2022-08-15

## 2022-08-15 RX ORDER — LIDOCAINE HYDROCHLORIDE 40 MG/ML
SOLUTION TOPICAL ONCE
Status: CANCELLED | OUTPATIENT
Start: 2022-08-15 | End: 2022-08-15

## 2022-08-15 RX ORDER — GINSENG 100 MG
CAPSULE ORAL ONCE
Status: CANCELLED | OUTPATIENT
Start: 2022-08-15 | End: 2022-08-15

## 2022-08-15 ASSESSMENT — PAIN DESCRIPTION - LOCATION: LOCATION: ANKLE

## 2022-08-15 ASSESSMENT — PAIN DESCRIPTION - ORIENTATION: ORIENTATION: RIGHT

## 2022-08-15 ASSESSMENT — VISUAL ACUITY: OU: 1

## 2022-08-15 ASSESSMENT — PAIN DESCRIPTION - DESCRIPTORS: DESCRIPTORS: ACHING;SHARP;SHOOTING

## 2022-08-15 ASSESSMENT — PAIN SCALES - GENERAL: PAINLEVEL_OUTOF10: 8

## 2022-08-15 NOTE — PROGRESS NOTES
Patient Care Team:  Salas Maki MD as PCP - General (Family Medicine)  Salas Maki MD as PCP - [address] [place] [address]  [facility] Provider  Chuck Garcia DO as Consulting Physician (Vascular Surgery)  SAQIB Gonsalves NP as Nurse Practitioner (Nurse Practitioner [facility])    TODAY'S DATE:  8/15/2022     HISTORY of PRESENTILLNESS HPI   Miranda Jackson is a 61 y.o. female who presents today for wound evaluation. She reports she developed a wound on right ankle. This started 2 month(s) ago. She believes this is not healing. She has been applying nothing. She has not had  fever or chills. She has a history of venous disease and has been wearing compression stockings.   Wound Type:venous  Wound Location: right medial ankle  Modifying factors:venous stasis, obesity, and inflammation    Patient Active Problem List   Diagnosis Code    Essential (primary) hypertension I10    Bilateral carpal tunnel syndrome G56.03    Generalized anxiety disorder F41.1    Familial hypercholesterolemia E78.01    Chronic midline low back pain without sciatica M54.50, G89.29    Obstructive sleep apnea syndrome G47.33    Recurrent deep vein thrombosis (DVT) ([facility]) I82.409    Recurrent pulmonary embolism ([facility]) I26.99    Osteoarthritis, knee M17.10    Splenic artery aneurysm ([facility]) I72.8    Skin ulcer of right ankle with fat layer exposed ([facility] L97.312    PVD (peripheral vascular disease) ([facility]) I73.9    Class 3 severe obesity due to excess calories with serious comorbidity and body mass index (BMI) of 40.0 to 44.9 in adult ([facility] E66.01, Z68.41       Miranda Jackson is a 61 y.o. female with the following history reviewed and recorded in [facility]:    Current Outpatient Medications   Medication Sig Dispense Refill    predniSONE (DELTASONE) 10 MG tablet Prednisone taper dosing  60mg x 2 days  50mg x 2 days  40mg x 2 days  30 mg x 2 days  20 mg x 2 days  10 mg x 2 days 10 tablet 0    clindamycin (CLEOCIN) 150 MG capsule Take 3 capsules by mouth in the morning and 3 capsules at noon and 3 capsules before bedtime. Do all this for 7 days.  63 capsule 0    mupirocin (BACTROBAN) 2 % ointment Apply topically 2 times daily x 5-7 days 30 g 0    ELIQUIS 5 MG TABS tablet TAKE 1 TABLET BY MOUTH TWICE DAILY 60 tablet 2    pravastatin (PRAVACHOL) 10 MG tablet TAKE 1 TABLET BY MOUTH EVERY DAY 90 tablet 3    cyclobenzaprine (FLEXERIL) 10 MG tablet TAKE 1 TABLET BY MOUTH EVERY NIGHT AT BEDTIME AS NEEDED FOR MUSCLE SPASMS 90 tablet 2    sertraline (ZOLOFT) 100 MG tablet TAKE ONE TABLET ONE TIME DAILY 90 tablet 3    metoprolol tartrate (LOPRESSOR) 25 MG tablet Take 1 tablet by mouth 2 times daily 180 tablet 3    losartan (COZAAR) 50 MG tablet TAKE 1 TABLET BY MOUTH ONE TIME A DAY 90 tablet 3    hydrOXYzine (ATARAX) 25 MG tablet TAKE 1-2 TABLETS BY MOUTH TAKE TABLET BY MOUTH EVERY NIGHT AT BEDTIME AS NEEDED FOR INSOMNIA 60 tablet 5    fexofenadine (ALLEGRA) 180 MG tablet Take 180 mg by mouth daily      albuterol sulfate HFA (VENTOLIN HFA) 108 (90 Base) MCG/ACT inhaler Inhale 2 puffs into the lungs 4 times daily as needed for Wheezing 1 Inhaler 0    Latanoprostene Bunod 0.024 % SOLN Apply to eye      Turmeric 450 MG CAPS Take by mouth      aspirin 81 MG EC tablet Take 81 mg by mouth daily      Ascorbic Acid (VITAMIN C) 500 MG CAPS Take by mouth      Cholecalciferol (VITAMIN D3) 2000 units CAPS Take by mouth 2 times daily       Current Facility-Administered Medications   Medication Dose Route Frequency Provider Last Rate Last Admin    lidocaine (XYLOCAINE) 2 % uro-jet   Topical PRN SAQIB Irby - CNP         Allergies: Ceclor [cefaclor], Ciprofloxacin hcl, Ultram [tramadol], and Zocor [simvastatin]  Past Medical History:   Diagnosis Date    Chronic midline low back pain without sciatica 10/2/2017    Essential (primary) hypertension 10/2/2017    Familial hypercholesterolemia 10/2/2017    Generalized anxiety disorder 10/2/2017    Glaucoma     Obstructive sleep apnea syndrome 10/2/2017    Osteoarthritis, knee        Past Surgical History:   Procedure Laterality Date    CHOLECYSTECTOMY      HYSTERECTOMY, TOTAL ABDOMINAL (CERVIX REMOVED)      KNEE ARTHROSCOPY      TUBAL LIGATION       Family History   Problem Relation Age of Onset    Heart Disease Mother     Heart Disease Father     Other Maternal Grandmother         aneurysm     Social History     Tobacco Use    Smoking status: Never     Passive exposure: Yes    Smokeless tobacco: Never   Substance Use Topics    Alcohol use: Not Currently         Review of Systems    Review of Systems   Skin:  Positive for wound. All other systems reviewed and are negative. All other review of systems are negative. Physical Exam    BP (!) 147/84   Pulse 87   Temp 98.4 °F (36.9 °C) (Temporal)   Resp 18   Ht 5' 5\" (1.651 m)   Wt 262 lb (118.8 kg)   BMI 43.60 kg/m²     Physical Exam  Vitals reviewed. Constitutional:       Appearance: Normal appearance. She is obese. HENT:      Head: Normocephalic and atraumatic. Right Ear: External ear normal.      Left Ear: External ear normal.   Eyes:      General: Lids are normal. Lids are everted, no foreign bodies appreciated. Vision grossly intact. Gaze aligned appropriately. Cardiovascular:      Rate and Rhythm: Normal rate and regular rhythm. Pulses: Normal pulses. Heart sounds: Normal heart sounds. Pulmonary:      Effort: Pulmonary effort is normal.      Breath sounds: Normal breath sounds. Abdominal:      General: Bowel sounds are normal.   Musculoskeletal:         General: Normal range of motion. Skin:     General: Skin is warm and dry. Capillary Refill: Capillary refill takes 2 to 3 seconds. Findings: Wound present. Neurological:      Mental Status: She is alert and oriented to person, place, and time. Psychiatric:         Mood and Affect: Mood normal.         Behavior: Behavior normal.         Thought Content:  Thought content normal. Judgment: Judgment normal.           Post Debridement Measurements and Assessment:    The patientspain isPain Level: 8  . Wound is has improved. Please refer to nursing measurements and assessment regarding wound pre and postdebridement. Wound 08/15/22 Pretibial Distal;Right wound 1- right ankle venous (Active)   Wound Image    08/15/22 1443   Wound Etiology Venous 08/15/22 1443   Dressing Status Old drainage noted 08/15/22 1443   Wound Cleansed Soap and water 08/15/22 1443   Dressing/Treatment Hydrofera blue; Other (comment) 08/15/22 1534   Wound Length (cm) 1.2 cm 08/15/22 1443   Wound Width (cm) 0.6 cm 08/15/22 1443   Wound Depth (cm) 0.1 cm 08/15/22 1443   Wound Surface Area (cm^2) 0.72 cm^2 08/15/22 1443   Wound Volume (cm^3) 0.072 cm^3 08/15/22 1443   Post-Procedure Length (cm) 1.2 cm 08/15/22 1534   Post-Procedure Width (cm) 0.6 cm 08/15/22 1534   Post-Procedure Depth (cm) 0.1 cm 08/15/22 1534   Post-Procedure Surface Area (cm^2) 0.72 cm^2 08/15/22 1534   Post-Procedure Volume (cm^3) 0.072 cm^3 08/15/22 1534   Wound Assessment Slough 08/15/22 1443   Drainage Amount Moderate 08/15/22 1443   Drainage Description Serosanguinous 08/15/22 1443   Odor None 08/15/22 1443   Jaz-wound Assessment Blanchable erythema 08/15/22 1443   Margins Defined edges 08/15/22 1443   Wound Thickness Description not for Pressure Injury Full thickness 08/15/22 1443   Number of days: 0            Debridement: Selective Debridement/Non-Excisional Debridement    Using forceps the wound(s)/ulcer(s) was/were sharply debrided down through and including the removal of epidermis and dermis.         Devitalized Tissue Debrided:  fibrin, biofilm, slough, and exudate    Pre Debridement Measurements:  Are located in the Wound/Ulcer Documentation Flow Sheet    Wound/Ulcer #: 1    Post Debridement Measurements:  Wound/Ulcer Descriptions are Pre Debridement except measurements:          Percent of Wound(s)/Ulcer(s) Debrided: 100%    Total Surface Area Debrided:  0.72 sq cm     Diabetic/Pressure/Non Pressure Ulcers only:  Ulcer: N/A     Estimated Blood Loss:  Minimal    Hemostasis Achieved:  by pressure    Procedural Pain:  10  / 10     Post Procedural Pain:  9 / 10     Response to treatment:  Well tolerated by patient., With complaints of pain. Assessment    1. Skin ulcer of right ankle with fat layer exposed ([address]    2. PVD (peripheral vascular disease) ([facility])    3. Class 3 severe obesity due to excess calories with serious comorbidity and body mass index (BMI) of 40.0 to 44.9 in adult [facility])          Plan  1.. PRAMOD in clinic    Planfor wound - Dress per physician order  Treatment:     Compression : Yes   Offloading : No   Dressing : Right leg Wound:   Wash with soap and water. Apply saline moistened hydrofera blue to wound bed then vaseline gauze then wrap with apply Calamine Coflex Unnaboot from toes to knee. Change once weekly . Discussed importance of leg compression and leg elevation, wound care, and plan of care. Patient understanding and questions answered. I spent a total of  60 minutes face to face with the patient. Over 75% of that time was spent on counseling and care coordination. Patient was told that if symptoms worsen or new symptoms develop they are to go to the emergency department immediately. Patient was educated on diagnosis and treatment plan. All of patient's questions were answered, and the patient understands the discharge plan. Discussed appropriate home care of this wound. Wound redressed. Patient instructions were given.   Recommend no smoking  Offloading instructions given    [address] and [facility] Oxygen Therapy   Physician Orders and Discharge Instructions  [address] [facility] [place]  [place], [address]  Telephone: [phone]    NAME:  Erika:  1955  MEDICAL RECORD NUMBER:  [id number]  DATE: @ED@    Discharge condition: Stable    Discharge to: Home    Left via:Private automobile    Accompanied by:  self    ECF/HHA: none    Start steroids as prescribed    Dressing Orders:  Right leg Wound:   Wash with soap and water. Apply saline moistened hydrofera blue to wound bed then vaseline gauze then wrap with apply Calamine Coflex Unnaboot from toes to knee. Change once weekly . Compression Therapy is an important part of your program of care. Compression makes it easier for your body to pump the blood from your legs back to your heart, helping alleviate a condition called chronic venous insufficiency. Chronic venous insufficiency is an underlying cause of your injury, and managing it properly will help you to heal.      Keep the bandage in place at all times unless you experience foot pain or numbness or coolness of the toes. Do not attempt to remove and reapply the bandage system. Give it time. Your bandage may feel tight at first. This is normal. Your bandage will become more comfortable as swelling goes down. Be active, as suggested by your healthcare provider. Daily walks or mild exercise encourages better blood flow to aid healing. Put your feet up when sitting for long periods of time. Keep the bandage dry. Wet compression bandages are more likely to slip down and/or cause damage to good skin. PRECAUTIONS For your safety, compression therapy should be used under the supervision of a healthcare professional. Contact your care provider if you experience any of the following:  Pain that does not go away with elevation. Discoloration or numbness of the toes. The bandage slipping out of place  Fluid leaking through the bandage. Treatment Orders:  Protein rich diet (unless restricted by your physician); Multivitamin daily; Elevate legs above the level of your heart when sitting 3-4 times daily for at least one hour each time, avoid standing for long periods of time.     [facility] follow up visit _____Friday/Tues with Bria________________________  (Please note your next appointment above and if you are unable to keep, kindly give a 24 hour notice. Thank you.)          If you experience any of the following, please call the [email or web address] during business hours:    * Increase in Pain  * Temperature over 101  * Increase in drainage from your wound  * Drainage with a foul odor  * Bleeding  * Increase in swelling  * Need for compression bandage changes due to slippage, breakthrough drainage. If you need medical attention outside of the business hours of the [email or web address] please contact your PCP or go to the nearest emergency room.

## 2022-08-15 NOTE — DISCHARGE INSTRUCTIONS
[address] [facility]   Physician Orders and Discharge Instructions  [address]  Telephone: [phone]    NAME:  Heladio Bell OF BIRTH:  1955  MEDICAL RECORD NUMBER:  [id number]  DATE:  @ED@    Discharge condition: Stable    Discharge to: Home    Left via:Private automobile    Accompanied by:  self    ECF/HHA: none    Start steroids as prescribed    Dressing Orders:  Right leg Wound:   Wash with soap and water. Apply saline moistened hydrofera blue to wound bed then vaseline gauze then wrap with apply Calamine Coflex Unnaboot from toes to knee. Change once weekly . Compression Therapy is an important part of your program of care. Compression makes it easier for your body to pump the blood from your legs back to your heart, helping alleviate a condition called chronic venous insufficiency. Chronic venous insufficiency is an underlying cause of your injury, and managing it properly will help you to heal.      Keep the bandage in place at all times unless you experience foot pain or numbness or coolness of the toes. Do not attempt to remove and reapply the bandage system. Give it time. Your bandage may feel tight at first. This is normal. Your bandage will become more comfortable as swelling goes down. Be active, as suggested by your healthcare provider. Daily walks or mild exercise encourages better blood flow to aid healing. Put your feet up when sitting for long periods of time. Keep the bandage dry. Wet compression bandages are more likely to slip down and/or cause damage to good skin. PRECAUTIONS For your safety, compression therapy should be used under the supervision of a healthcare professional. Contact your care provider if you experience any of the following:  Pain that does not go away with elevation. Discoloration or numbness of the toes.   The bandage slipping out of place  Fluid leaking through the bandage. Treatment Orders:  Protein rich diet (unless restricted by your physician); Multivitamin daily; Elevate legs above the level of your heart when sitting 3-4 times daily for at least one hour each time, avoid standing for long periods of time. [address] [place] [address],[address] follow up visit _____Friday/Tues with Bria________________________  (Please note your next appointment above and if you are unable to keep, kindly give a 24 hour notice. Thank you.)          If you experience any of the following, please call the [facility] during business hours:    * Increase in Pain  * Temperature over 101  * Increase in drainage from your wound  * Drainage with a foul odor  * Bleeding  * Increase in swelling  * Need for compression bandage changes due to slippage, breakthrough drainage. If you need medical attention outside of the business hours of the [facility] please contact your PCP or go to the nearest emergency room.

## 2022-08-15 NOTE — PLAN OF CARE
Problem: Discharge Planning  Goal: Discharge to home or other facility with appropriate resources  Outcome: Progressing     Problem: Pain  Goal: Verbalizes/displays adequate comfort level or baseline comfort level  Outcome: Progressing     Problem: Wound:  Goal: Will show signs of wound healing; wound closure and no evidence of infection  Description: Will show signs of wound healing; wound closure and no evidence of infection  Outcome: Progressing     Problem: Venous:  Goal: Signs of wound healing will improve  Description: Signs of wound healing will improve  Outcome: Progressing     Problem: Compression therapy:  Goal: Will be free from complications associated with compression therapy  Description: Will be free from complications associated with compression therapy  Outcome: Progressing     Problem: Weight control:  Goal: Ability to maintain an optimal weight for height and age will be supported  Description: Ability to maintain an optimal weight for height and age will be supported  Outcome: Progressing     Problem: Falls - Risk of:  Goal: Will remain free from falls  Description: Will remain free from falls  Outcome: Progressing

## 2022-08-17 LAB
ANAEROBIC CULTURE: ABNORMAL
GRAM STAIN RESULT: ABNORMAL
ORGANISM: ABNORMAL
WOUND/ABSCESS: ABNORMAL

## 2022-08-17 NOTE — DISCHARGE INSTRUCTIONS
[address] [facility]   Physician Orders and Discharge Instructions  [address]  Telephone: [phone]    NAME:  Nicolás Henderson OF BIRTH:  1955  MEDICAL RECORD NUMBER:  [id number]  DATE:  @ED@    Discharge condition: Stable    Discharge to: Home    Left via:Private automobile    Accompanied by:  self    ECF/HHA: none     Dressing Orders:   Right leg Wound:   Wash with soap and water. Apply saline moistened hydrofera blue to wound bed then vaseline gauze then wrap with apply Calamine Coflex Unnaboot from toes to knee. Change once weekly . Compression Therapy is an important part of your program of care. Compression makes it easier for your body to pump the blood from your legs back to your heart, helping alleviate a condition called chronic venous insufficiency. Chronic venous insufficiency is an underlying cause of your injury, and managing it properly will help you to heal.       · Keep the bandage in place at all times unless you experience foot pain or numbness or coolness of the toes. Do not attempt to remove and reapply the bandage system. · Give it time. Your bandage may feel tight at first. This is normal. Your bandage will become more comfortable as swelling goes down. · Be active, as suggested by your healthcare provider. Daily walks or mild exercise encourages better blood flow to aid healing. · Put your feet up when sitting for long periods of time. · Keep the bandage dry. Wet compression bandages are more likely to slip down and/or cause damage to good skin. PRECAUTIONS For your safety, compression therapy should be used under the supervision of a healthcare professional. Contact your care provider if you experience any of the following:   · Pain that does not go away with elevation. · Discoloration or numbness of the toes.    · The bandage slipping out of place   · Fluid leaking through the bandage. Treatment Orders:   Protein rich diet (unless restricted by your physician); Multivitamin daily; Elevate legs above the level of your heart when sitting 3-4 times daily for at least one hour each time, avoid standing for long periods of time. Complete steroids prescription as directed    [address] [facility] follow up visit ______Friday then 1 week with Bria_______________________  (Please note your next appointment above and if you are unable to keep, kindly give a 24 hour notice. Thank you.)          If you experience any of the following, please call the [facility] [address] during business hours:    * Increase in Pain  * Temperature over 101  * Increase in drainage from your wound  * Drainage with a foul odor  * Bleeding  * Increase in swelling  * Need for compression bandage changes due to slippage, breakthrough drainage. If you need medical attention outside of the business hours of the [facility] please contact your PCP or go to the nearest emergency room.

## 2022-08-19 ENCOUNTER — HOSPITAL ENCOUNTER (OUTPATIENT)
Dept: WOUND CARE | Age: 67
Discharge: HOME | End: 2022-08-19
Payer: COMMERCIAL

## 2022-08-19 VITALS
BODY MASS INDEX: 43.65 KG/M2 | RESPIRATION RATE: 18 BRPM | TEMPERATURE: 97 F | SYSTOLIC BLOOD PRESSURE: 142 MMHG | HEART RATE: 77 BPM | WEIGHT: 262 LBS | DIASTOLIC BLOOD PRESSURE: 84 MMHG | HEIGHT: 65 IN

## 2022-08-19 DIAGNOSIS — I73.9 PVD (PERIPHERAL VASCULAR DISEASE) (HCC): ICD-10-CM

## 2022-08-19 DIAGNOSIS — L97.312 SKIN ULCER OF RIGHT ANKLE WITH FAT LAYER EXPOSED (HCC): Primary | ICD-10-CM

## 2022-08-19 DIAGNOSIS — E66.01 CLASS 3 SEVERE OBESITY DUE TO EXCESS CALORIES WITH SERIOUS COMORBIDITY AND BODY MASS INDEX (BMI) OF 40.0 TO 44.9 IN ADULT (HCC): ICD-10-CM

## 2022-08-19 PROCEDURE — 29580 STRAPPING UNNA BOOT: CPT

## 2022-08-19 ASSESSMENT — PAIN SCALES - GENERAL: PAINLEVEL_OUTOF10: 5

## 2022-08-19 ASSESSMENT — PAIN DESCRIPTION - PAIN TYPE: TYPE: ACUTE PAIN

## 2022-08-19 ASSESSMENT — PAIN DESCRIPTION - FREQUENCY: FREQUENCY: INTERMITTENT

## 2022-08-19 NOTE — PLAN OF CARE
[place] Application   Below Knee    NAME:  Landrum Epley  YOB: 1955  MEDICAL RECORD NUMBER:  [id number]  DATE:  8/19/2022    Renee Arm boot: Applied moisturizing agent to dry skin as needed. Appied primary and secondary dressing as ordered. Applied Unna roll from toes to knee overlapping each time. Applied ace wrap or coban from toes to below the knee. Instructed patient/caregiver to keep dressing dry and intact. DO NOT REMOVE DRESSING. Instructed pt/family/caregiver to report excessive draining, loose bandage, wet dressing, severe pain or tingling in toes. Applied [place] dressing below the knee to right lower leg. Unna Boot(s) were applied per  Guidelines.      Electronically signed by Edie Alfonso RN on 8/19/2022 at 9:53 AM

## 2022-08-23 ENCOUNTER — HOSPITAL ENCOUNTER (OUTPATIENT)
Dept: WOUND CARE | Age: 67
Discharge: HOME | End: 2022-08-23
Payer: COMMERCIAL

## 2022-08-23 VITALS
BODY MASS INDEX: 43.65 KG/M2 | DIASTOLIC BLOOD PRESSURE: 81 MMHG | HEART RATE: 78 BPM | HEIGHT: 65 IN | TEMPERATURE: 97.5 F | SYSTOLIC BLOOD PRESSURE: 144 MMHG | WEIGHT: 262 LBS | RESPIRATION RATE: 18 BRPM

## 2022-08-23 DIAGNOSIS — I73.9 PVD (PERIPHERAL VASCULAR DISEASE) (HCC): ICD-10-CM

## 2022-08-23 DIAGNOSIS — E66.01 CLASS 3 SEVERE OBESITY DUE TO EXCESS CALORIES WITH SERIOUS COMORBIDITY AND BODY MASS INDEX (BMI) OF 40.0 TO 44.9 IN ADULT (HCC): ICD-10-CM

## 2022-08-23 DIAGNOSIS — L97.312 SKIN ULCER OF RIGHT ANKLE WITH FAT LAYER EXPOSED (HCC): Primary | ICD-10-CM

## 2022-08-23 PROCEDURE — 6370000000 HC RX 637 (ALT 250 FOR IP): Performed by: NURSE PRACTITIONER

## 2022-08-23 PROCEDURE — 99212 OFFICE O/P EST SF 10 MIN: CPT | Performed by: NURSE PRACTITIONER

## 2022-08-23 PROCEDURE — 99213 OFFICE O/P EST LOW 20 MIN: CPT

## 2022-08-23 RX ORDER — LIDOCAINE 50 MG/G
OINTMENT TOPICAL ONCE
Status: CANCELLED | OUTPATIENT
Start: 2022-08-23 | End: 2022-08-23

## 2022-08-23 RX ORDER — LIDOCAINE 40 MG/G
CREAM TOPICAL ONCE
Status: CANCELLED | OUTPATIENT
Start: 2022-08-23 | End: 2022-08-23

## 2022-08-23 RX ORDER — LIDOCAINE HYDROCHLORIDE 20 MG/ML
JELLY TOPICAL ONCE
Status: CANCELLED | OUTPATIENT
Start: 2022-08-23 | End: 2022-08-23

## 2022-08-23 RX ORDER — BETAMETHASONE DIPROPIONATE 0.05 %
OINTMENT (GRAM) TOPICAL ONCE
Status: CANCELLED | OUTPATIENT
Start: 2022-08-23 | End: 2022-08-23

## 2022-08-23 RX ORDER — BACITRACIN, NEOMYCIN, POLYMYXIN B 400; 3.5; 5 [USP'U]/G; MG/G; [USP'U]/G
OINTMENT TOPICAL ONCE
Status: CANCELLED | OUTPATIENT
Start: 2022-08-23 | End: 2022-08-23

## 2022-08-23 RX ORDER — BACITRACIN ZINC AND POLYMYXIN B SULFATE 500; 1000 [USP'U]/G; [USP'U]/G
OINTMENT TOPICAL ONCE
Status: CANCELLED | OUTPATIENT
Start: 2022-08-23 | End: 2022-08-23

## 2022-08-23 RX ORDER — GENTAMICIN SULFATE 1 MG/G
OINTMENT TOPICAL ONCE
Status: CANCELLED | OUTPATIENT
Start: 2022-08-23 | End: 2022-08-23

## 2022-08-23 RX ORDER — GINSENG 100 MG
CAPSULE ORAL ONCE
Status: CANCELLED | OUTPATIENT
Start: 2022-08-23 | End: 2022-08-23

## 2022-08-23 RX ORDER — LIDOCAINE HYDROCHLORIDE 20 MG/ML
JELLY TOPICAL ONCE
Status: COMPLETED | OUTPATIENT
Start: 2022-08-23 | End: 2022-08-23

## 2022-08-23 RX ORDER — CLOBETASOL PROPIONATE 0.5 MG/G
OINTMENT TOPICAL ONCE
Status: CANCELLED | OUTPATIENT
Start: 2022-08-23 | End: 2022-08-23

## 2022-08-23 RX ORDER — LIDOCAINE HYDROCHLORIDE 40 MG/ML
SOLUTION TOPICAL ONCE
Status: CANCELLED | OUTPATIENT
Start: 2022-08-23 | End: 2022-08-23

## 2022-08-23 RX ADMIN — LIDOCAINE HYDROCHLORIDE: 20 JELLY TOPICAL at 09:29

## 2022-08-23 ASSESSMENT — PAIN DESCRIPTION - ORIENTATION: ORIENTATION: RIGHT

## 2022-08-23 ASSESSMENT — PAIN DESCRIPTION - FREQUENCY: FREQUENCY: INTERMITTENT

## 2022-08-23 ASSESSMENT — PAIN SCALES - GENERAL: PAINLEVEL_OUTOF10: 1

## 2022-08-23 ASSESSMENT — PAIN DESCRIPTION - LOCATION: LOCATION: ANKLE

## 2022-08-23 ASSESSMENT — PAIN DESCRIPTION - PAIN TYPE: TYPE: ACUTE PAIN

## 2022-08-23 NOTE — PROGRESS NOTES
[place] Application   Below Knee    NAME:  Vanesa Manzo  YOB: 1955  MEDICAL RECORD NUMBER:  [id number]  DATE:  8/23/2022    Chayo Arenas boot: Applied moisturizing agent to dry skin as needed. Appied primary and secondary dressing as ordered. Applied Unna roll from toes to knee overlapping each time. Applied ace wrap or coban from toes to below the knee. Instructed patient/caregiver to keep dressing dry and intact. DO NOT REMOVE DRESSING. Instructed pt/family/caregiver to report excessive draining, loose bandage, wet dressing, severe pain or tingling in toes. Applied [place] dressing below the knee to right lower leg. Unna Boot(s) were applied per  Guidelines.      Electronically signed by Estee Vazquez RN on 8/23/2022 at 10:13 AM

## 2022-08-23 NOTE — PROGRESS NOTES
[address]   Progress Note and Procedure Note      [address] [facility] RECORD NUMBER:  [id number]  AGE: 61 y.o. GENDER: female  : 1955  EPISODE DATE:  2022    Subjective:     Chief Complaint   Patient presents with    Wound Check     Patient presents today for recheck right ankle wound. HISTORY of PRESENT ILLNESS HPI     Vanesa Manzo is a 61 y.o. female who presents today for wound/ulcer evaluation.    History of Wound Context: right leg wound follow up/eval and treat    Ulcer Identification:  Ulcer Type: venous  Contributing Factors: edema, venous stasis, and obesity    Wound: N/A        PAST MEDICAL HISTORY        Diagnosis Date    Chronic midline low back pain without sciatica 10/2/2017    Essential (primary) hypertension 10/2/2017    Familial hypercholesterolemia 10/2/2017    Generalized anxiety disorder 10/2/2017    Glaucoma     Obstructive sleep apnea syndrome 10/2/2017    Osteoarthritis, knee        PAST SURGICAL HISTORY    Past Surgical History:   Procedure Laterality Date    CHOLECYSTECTOMY      HYSTERECTOMY, TOTAL ABDOMINAL (CERVIX REMOVED)      KNEE ARTHROSCOPY      TUBAL LIGATION         FAMILY HISTORY    Family History   Problem Relation Age of Onset    Heart Disease Mother     Heart Disease Father     Other Maternal Grandmother         aneurysm       SOCIAL HISTORY    Social History     Tobacco Use    Smoking status: Never     Passive exposure: Yes    Smokeless tobacco: Never   Vaping Use    Vaping Use: Never used   Substance Use Topics    Alcohol use: Not Currently       ALLERGIES    Allergies   Allergen Reactions    Ceclor [Cefaclor]     Ciprofloxacin Hcl     Ultram [Tramadol]      fatigue    Zocor [Simvastatin]      cough       MEDICATIONS    Current Outpatient Medications on File Prior to Encounter   Medication Sig Dispense Refill    predniSONE (DELTASONE) 10 MG tablet Prednisone taper dosing  60mg x 2 days  50mg x 2 days  40mg x 2 days  30 mg x 2 days  20 mg x 2 days  10 mg x 2 days 10 tablet 0    mupirocin (BACTROBAN) 2 % ointment Apply topically 2 times daily x 5-7 days 30 g 0    ELIQUIS 5 MG TABS tablet TAKE 1 TABLET BY MOUTH TWICE DAILY 60 tablet 2    pravastatin (PRAVACHOL) 10 MG tablet TAKE 1 TABLET BY MOUTH EVERY DAY 90 tablet 3    cyclobenzaprine (FLEXERIL) 10 MG tablet TAKE 1 TABLET BY MOUTH EVERY NIGHT AT BEDTIME AS NEEDED FOR MUSCLE SPASMS 90 tablet 2    sertraline (ZOLOFT) 100 MG tablet TAKE ONE TABLET ONE TIME DAILY 90 tablet 3    metoprolol tartrate (LOPRESSOR) 25 MG tablet Take 1 tablet by mouth 2 times daily 180 tablet 3    losartan (COZAAR) 50 MG tablet TAKE 1 TABLET BY MOUTH ONE TIME A DAY 90 tablet 3    hydrOXYzine (ATARAX) 25 MG tablet TAKE 1-2 TABLETS BY MOUTH TAKE TABLET BY MOUTH EVERY NIGHT AT BEDTIME AS NEEDED FOR INSOMNIA 60 tablet 5    fexofenadine (ALLEGRA) 180 MG tablet Take 180 mg by mouth daily      albuterol sulfate HFA (VENTOLIN HFA) 108 (90 Base) MCG/ACT inhaler Inhale 2 puffs into the lungs 4 times daily as needed for Wheezing 1 Inhaler 0    Latanoprostene Bunod 0.024 % SOLN Apply to eye      Turmeric 450 MG CAPS Take by mouth      aspirin 81 MG EC tablet Take 81 mg by mouth daily      Ascorbic Acid (VITAMIN C) 500 MG CAPS Take by mouth      Cholecalciferol (VITAMIN D3) 2000 units CAPS Take by mouth 2 times daily       No current facility-administered medications on file prior to encounter. REVIEW OF SYSTEMS    Pertinent items are noted in HPI.     Objective:      BP (!) 144/81   Pulse 78   Temp 97.5 °F (36.4 °C) (Temporal)   Resp 18   Ht 5' 5\" (1.651 m)   Wt 262 lb (118.8 kg)   BMI 43.60 kg/m²     Wt Readings from Last 3 Encounters:   08/23/22 262 lb (118.8 kg)   08/19/22 262 lb (118.8 kg)   08/15/22 262 lb (118.8 kg)       PHYSICAL EXAM    General Appearance: alert and oriented to person, place and time, well developed and well- nourished, in no acute distress  Skin: warm and dry, no rash or erythema  Head: normocephalic and atraumatic  Eyes: pupils equal, round, and reactive to light, extraocular eye movements intact, conjunctivae normal  ENT: tympanic membrane, external ear and ear canal normal bilaterally, nose without deformity, nasal mucosa and turbinates normal without polyps  Neck: supple and non-tender without mass, no thyromegaly or thyroid nodules, no cervical lymphadenopathy  Pulmonary/Chest: clear to auscultation bilaterally- no wheezes, rales or rhonchi, normal air movement, no respiratory distress  Extremities: no cyanosis, clubbing or edema  Musculoskeletal: normal range of motion, no joint swelling, deformity or tenderness  Neurologic: reflexes normal and symmetric, no cranial nerve deficit, gait, coordination and speech normal      Assessment:      Patient Active Problem List   Diagnosis Code    Essential (primary) hypertension I10    Bilateral carpal tunnel syndrome G56.03    Generalized anxiety disorder F41.1    Familial hypercholesterolemia E78.01    Chronic midline low back pain without sciatica M54.50, G89.29    Obstructive sleep apnea syndrome G47.33    Recurrent deep vein thrombosis (DVT) ([facility]) I82.409    Recurrent pulmonary embolism ([facility]) I26.99    Osteoarthritis, knee M17.10    Splenic artery aneurysm ([facility]) I72.8    Skin ulcer of right ankle with fat layer exposed ([facility] L97.312    PVD (peripheral vascular disease) ([facility]) I73.9    Class 3 severe obesity due to excess calories with serious comorbidity and body mass index (BMI) of 40.0 to 44.9 in adult ([facility] E66.01, Z68.41                 Wound 08/15/22 Pretibial Distal;Right wound 1- right ankle venous (Active)   Wound Image   08/23/22 0935   Wound Etiology Venous 08/23/22 0935   Dressing Status New dressing applied 08/23/22 1000   Wound Cleansed Soap and water 08/23/22 0935   Dressing/Treatment Hydrofera blue; Other (comment); Moisten with saline;Petroleum gauze 08/23/22 1000   Wound Length (cm) 0.4 cm 08/23/22 0935   Wound Width (cm) 0.3 cm 08/23/22 0935   Wound Depth (cm) 0.1 cm 08/23/22 0935   Wound Surface Area (cm^2) 0.12 cm^2 08/23/22 0935   Change in Wound Size % (l*w) 83.33 08/23/22 0935   Wound Volume (cm^3) 0.012 cm^3 08/23/22 0935   Wound Healing % 83 08/23/22 0935   Post-Procedure Length (cm) 1.2 cm 08/15/22 1534   Post-Procedure Width (cm) 0.6 cm 08/15/22 1534   Post-Procedure Depth (cm) 0.1 cm 08/15/22 1534   Post-Procedure Surface Area (cm^2) 0.72 cm^2 08/15/22 1534   Post-Procedure Volume (cm^3) 0.072 cm^3 08/15/22 1534   Distance Tunneling (cm) 0 cm 08/23/22 0935   Tunneling Position ___ O'Clock 0 08/23/22 0935   Undermining Starts ___ O'Clock 0 08/23/22 0935   Undermining Ends___ O'Clock 0 08/23/22 0935   Undermining Maxium Distance (cm) 0 08/23/22 0935   Wound Assessment [place]/red;Slough 08/23/22 0935   Drainage Amount Moderate 08/23/22 0935   Drainage Description Serosanguinous 08/23/22 0935   Odor None 08/23/22 0935   Jaz-wound Assessment Blanchable erythema 08/23/22 0935   Margins Defined edges 08/23/22 0935   Wound Thickness Description not for Pressure Injury Full thickness 08/23/22 0935   Number of days: 7       Plan:     Problem List Items Addressed This Visit          Circulatory    PVD (peripheral vascular disease) ([address]    Relevant Orders    Initiate Outpatient Wound Care Protocol       Other    * (Principal) Skin ulcer of right ankle with fat layer exposed ([address] - Primary    Relevant Orders    Initiate Outpatient Wound Care Protocol    Class 3 severe obesity due to excess calories with serious comorbidity and body mass index (BMI) of 40.0 to 44.9 in [facility])    Relevant Orders    Initiate Outpatient Wound Care Protocol           Treatment Note please see attached Discharge Instructions    In my professional opinion this patient would benefit from HBO Therapy: No    Written patient dismissal instructions given to patient and signed by patient or POA.          Ms. Kat High is healing well, she has 2 days left of steroids then compression only. Follow up 1 week with me. Discharge [address] and Hyper[facility] O[facility] Therapy   Physician Orders and Discharge Instructions  [address] [facility]  Telephone: [phone]    NAME:  Erika:  1955  MEDICAL RECORD NUMBER:  [id number]  DATE:  @ED@    Discharge condition: Stable    Discharge to: Home    Left via:Private automobile    Accompanied by:  self    ECF/HHA: none     Dressing Orders:   Right leg Wound:   Wash with soap and water. Apply saline moistened hydrofera blue to wound bed then vaseline gauze then wrap with apply Calamine Coflex Unnaboot from toes to knee. Change once weekly . Compression Therapy is an important part of your program of care. Compression makes it easier for your body to pump the blood from your legs back to your heart, helping alleviate a condition called chronic venous insufficiency. Chronic venous insufficiency is an underlying cause of your injury, and managing it properly will help you to heal.       · Keep the bandage in place at all times unless you experience foot pain or numbness or coolness of the toes. Do not attempt to remove and reapply the bandage system. · Give it time. Your bandage may feel tight at first. This is normal. Your bandage will become more comfortable as swelling goes down. · Be active, as suggested by your healthcare provider. Daily walks or mild exercise encourages better blood flow to aid healing. · Put your feet up when sitting for long periods of time. · Keep the bandage dry. Wet compression bandages are more likely to slip down and/or cause damage to good skin.      PRECAUTIONS For your safety, compression therapy should be used under the supervision of a healthcare professional. Contact your care provider if you experience any of the following:   · Pain that does not go away

## 2022-08-24 NOTE — DISCHARGE INSTRUCTIONS
[address] [facility] O[facility] Therapy   Physician Orders and Discharge Instructions  [address]  Telephone: [phone]    NAME:  Todd Graham OF BIRTH:  1955  MEDICAL RECORD NUMBER:  [id number]  DATE:  @ED@    Discharge condition: Stable    Discharge to: Home    Left via:Private automobile    Accompanied by:  self    ECF/HHA: none     1) Moisturize your  the right ankle and foot with a good thick cream like Eucerin Cream, Aquaphor or Cocoa Butter (in a jar) at least twice daily. Dry skin has a greater likelihood of breaking down if it is stretched by swelling. 2) Elevate legs when sitting, above the heart preferably. Avoid sleeping in a recliner. 3) Avoid standing for long periods of time. 4) Walking is good. 5)  Wear knee high graduated compression stockings 20 mmHg on both legs. (This is what prevents these ulcerations)  Put these on in the morning no more than 20 minutes after rising and remove before going to bed. You can hand wash and hang to dry overnight. We recommend having two pairs to rotate washing and wearing. 6) Be very careful with your legs. Often these are caused by a bump or scrape that doesn't heal well. Healing is prevented or slowed by your Venous insufficiency. Compression and keeping the swelling out of your legs is the key to the condition of your skin. 7) You can buy these stockings  at many pharmacies and even online, but you must have a calf and ankle measurement (taken in the morning before your legs swell) to order them. You will also need the strength that the physician has ordered. Most insurance companies do not pay for these unless you have an open ulceration currently. 8)  Replace your stockings every 6 months as they stretch out and become less effective. 9) If you are overweight, losing weight can be helpful.        May use duoderm to cover area to  protect if needed    Follow up with Vascular as scheduled    [facility] follow up visit ___________as needed__________________  (Please note your next appointment above and if you are unable to keep, kindly give a 24 hour notice. Thank you.)          If you experience any of the following, please call the [address] during business hours:    * Increase in Pain  * Temperature over 101  * Increase in drainage from your wound  * Drainage with a foul odor  * Bleeding  * Increase in swelling  * Need for compression bandage changes due to slippage, breakthrough drainage. If you need medical attention outside of the business hours of the [address] please contact your PCP or go to the nearest emergency room.

## 2022-08-26 ENCOUNTER — HOSPITAL ENCOUNTER (OUTPATIENT)
Dept: WOUND CARE | Age: 67
Discharge: HOME | End: 2022-08-26
Payer: COMMERCIAL

## 2022-08-26 VITALS
DIASTOLIC BLOOD PRESSURE: 70 MMHG | SYSTOLIC BLOOD PRESSURE: 116 MMHG | TEMPERATURE: 97.4 F | RESPIRATION RATE: 18 BRPM | HEART RATE: 82 BPM

## 2022-08-26 DIAGNOSIS — I73.9 PVD (PERIPHERAL VASCULAR DISEASE) (HCC): ICD-10-CM

## 2022-08-26 DIAGNOSIS — L97.312 SKIN ULCER OF RIGHT ANKLE WITH FAT LAYER EXPOSED (HCC): Primary | ICD-10-CM

## 2022-08-26 DIAGNOSIS — E66.01 CLASS 3 SEVERE OBESITY DUE TO EXCESS CALORIES WITH SERIOUS COMORBIDITY AND BODY MASS INDEX (BMI) OF 40.0 TO 44.9 IN ADULT (HCC): ICD-10-CM

## 2022-08-26 PROCEDURE — 29580 STRAPPING UNNA BOOT: CPT

## 2022-08-26 RX ORDER — LIDOCAINE 50 MG/G
OINTMENT TOPICAL ONCE
Status: CANCELLED | OUTPATIENT
Start: 2022-08-26 | End: 2022-08-26

## 2022-08-26 RX ORDER — LIDOCAINE HYDROCHLORIDE 20 MG/ML
JELLY TOPICAL ONCE
Status: CANCELLED | OUTPATIENT
Start: 2022-08-26 | End: 2022-08-26

## 2022-08-26 RX ORDER — BACITRACIN, NEOMYCIN, POLYMYXIN B 400; 3.5; 5 [USP'U]/G; MG/G; [USP'U]/G
OINTMENT TOPICAL ONCE
Status: CANCELLED | OUTPATIENT
Start: 2022-08-26 | End: 2022-08-26

## 2022-08-26 RX ORDER — GINSENG 100 MG
CAPSULE ORAL ONCE
Status: CANCELLED | OUTPATIENT
Start: 2022-08-26 | End: 2022-08-26

## 2022-08-26 RX ORDER — LIDOCAINE HYDROCHLORIDE 40 MG/ML
SOLUTION TOPICAL ONCE
Status: CANCELLED | OUTPATIENT
Start: 2022-08-26 | End: 2022-08-26

## 2022-08-26 RX ORDER — BACITRACIN ZINC AND POLYMYXIN B SULFATE 500; 1000 [USP'U]/G; [USP'U]/G
OINTMENT TOPICAL ONCE
Status: CANCELLED | OUTPATIENT
Start: 2022-08-26 | End: 2022-08-26

## 2022-08-26 RX ORDER — CLOBETASOL PROPIONATE 0.5 MG/G
OINTMENT TOPICAL ONCE
Status: CANCELLED | OUTPATIENT
Start: 2022-08-26 | End: 2022-08-26

## 2022-08-26 RX ORDER — GENTAMICIN SULFATE 1 MG/G
OINTMENT TOPICAL ONCE
Status: CANCELLED | OUTPATIENT
Start: 2022-08-26 | End: 2022-08-26

## 2022-08-26 RX ORDER — LIDOCAINE 40 MG/G
CREAM TOPICAL ONCE
Status: CANCELLED | OUTPATIENT
Start: 2022-08-26 | End: 2022-08-26

## 2022-08-26 RX ORDER — BETAMETHASONE DIPROPIONATE 0.05 %
OINTMENT (GRAM) TOPICAL ONCE
Status: CANCELLED | OUTPATIENT
Start: 2022-08-26 | End: 2022-08-26

## 2022-08-26 NOTE — PLAN OF CARE
Problem: Discharge Planning  Goal: Discharge to home or other facility with appropriate resources  Outcome: Progressing     Problem: Wound:  Goal: Will show signs of wound healing; wound closure and no evidence of infection  Description: Will show signs of wound healing; wound closure and no evidence of infection  Outcome: Progressing     Problem: Venous:  Goal: Signs of wound healing will improve  Description: Signs of wound healing will improve  Outcome: Progressing     Problem: Compression therapy:  Goal: Will be free from complications associated with compression therapy  Description: Will be free from complications associated with compression therapy  Outcome: Progressing     Problem: Weight control:  Goal: Ability to maintain an optimal weight for height and age will be supported  Description: Ability to maintain an optimal weight for height and age will be supported  Outcome: Progressing     Problem: Falls - Risk of:  Goal: Will remain free from falls  Description: Will remain free from falls  Outcome: Progressing

## 2022-08-26 NOTE — DISCHARGE INSTRUCTIONS
[address] and [facility] Oxygen Therapy   Physician Orders and Discharge Instructions  [address] [place] [address]  Telephone: [phone]    NAME:  Chuy Lo OF BIRTH:  1955  MEDICAL RECORD NUMBER:  [id number]  DATE:  8/26/22    Discharge condition: Stable    Discharge to: Home    Left via:Private automobile    Accompanied by:  self    ECF/HHA: none    Dressing Orders:   Right leg Wound:   Wash with soap and water. Apply saline moistened hydrofera blue to wound bed then vaseline gauze then wrap with apply Calamine Coflex Unnaboot from toes to knee. Change once weekly . Compression Therapy is an important part of your program of care. Compression makes it easier for your body to pump the blood from your legs back to your heart, helping alleviate a condition called chronic venous insufficiency. Chronic venous insufficiency is an underlying cause of your injury, and managing it properly will help you to heal.       · Keep the bandage in place at all times unless you experience foot pain or numbness or coolness of the toes. Do not attempt to remove and reapply the bandage system. · Give it time. Your bandage may feel tight at first. This is normal. Your bandage will become more comfortable as swelling goes down. · Be active, as suggested by your healthcare provider. Daily walks or mild exercise encourages better blood flow to aid healing. · Put your feet up when sitting for long periods of time. · Keep the bandage dry. Wet compression bandages are more likely to slip down and/or cause damage to good skin. PRECAUTIONS For your safety, compression therapy should be used under the supervision of a healthcare professional. Contact your care provider if you experience any of the following:   · Pain that does not go away with elevation. · Discoloration or numbness of the toes.    · The bandage slipping out of place   · Fluid leaking through the bandage. Treatment Orders:   Protein rich diet (unless restricted by your physician); Multivitamin daily; Elevate legs above the level of your heart when sitting 3-4 times daily for at least one hour each time, avoid standing for long periods of time. [address] [place] [address],[address] follow up visit ______8/30/22_______________________  (Please note your next appointment above and if you are unable to keep, kindly give a 24 hour notice. Thank you.)          If you experience any of the following, please call the [facility] during business hours:    * Increase in Pain  * Temperature over 101  * Increase in drainage from your wound  * Drainage with a foul odor  * Bleeding  * Increase in swelling  * Need for compression bandage changes due to slippage, breakthrough drainage. If you need medical attention outside of the business hours of the [address] please contact your PCP or go to the nearest emergency room.

## 2022-08-26 NOTE — PROGRESS NOTES
Jodean Gaston Application   Below Knee    NAME:  Vielka Brennan  YOB: 1955  MEDICAL RECORD NUMBER:  [id number]  DATE:  8/26/2022    Ilia Iha boot: Applied moisturizing agent to dry skin as needed. Appied primary and secondary dressing as ordered. Applied Unna roll from toes to knee overlapping each time. Applied ace wrap or coban from toes to below the knee. Instructed patient/caregiver to keep dressing dry and intact. DO NOT REMOVE DRESSING. Instructed pt/family/caregiver to report excessive draining, loose bandage, wet dressing, severe pain or tingling in toes. Applied Jodean Gaston dressing below the knee to right lower leg. Unna Boot(s) were applied per  Guidelines.      Electronically signed by Art Palacios RN on 8/26/2022 at 11:59 AM

## 2022-08-30 ENCOUNTER — HOSPITAL ENCOUNTER (OUTPATIENT)
Dept: WOUND CARE | Age: 67
Discharge: HOME | End: 2022-08-30
Payer: COMMERCIAL

## 2022-08-30 VITALS
DIASTOLIC BLOOD PRESSURE: 70 MMHG | SYSTOLIC BLOOD PRESSURE: 126 MMHG | RESPIRATION RATE: 20 BRPM | TEMPERATURE: 96.9 F | HEART RATE: 78 BPM

## 2022-08-30 DIAGNOSIS — L97.312 SKIN ULCER OF RIGHT ANKLE WITH FAT LAYER EXPOSED (HCC): Primary | ICD-10-CM

## 2022-08-30 DIAGNOSIS — E66.01 CLASS 3 SEVERE OBESITY DUE TO EXCESS CALORIES WITH SERIOUS COMORBIDITY AND BODY MASS INDEX (BMI) OF 40.0 TO 44.9 IN ADULT (HCC): ICD-10-CM

## 2022-08-30 DIAGNOSIS — I73.9 PVD (PERIPHERAL VASCULAR DISEASE) (HCC): ICD-10-CM

## 2022-08-30 PROCEDURE — 99213 OFFICE O/P EST LOW 20 MIN: CPT

## 2022-08-30 PROCEDURE — 6370000000 HC RX 637 (ALT 250 FOR IP): Performed by: NURSE PRACTITIONER

## 2022-08-30 PROCEDURE — 99212 OFFICE O/P EST SF 10 MIN: CPT | Performed by: NURSE PRACTITIONER

## 2022-08-30 RX ORDER — BETAMETHASONE DIPROPIONATE 0.05 %
OINTMENT (GRAM) TOPICAL ONCE
Status: CANCELLED | OUTPATIENT
Start: 2022-08-30 | End: 2022-08-30

## 2022-08-30 RX ORDER — LIDOCAINE 50 MG/G
OINTMENT TOPICAL ONCE
Status: CANCELLED | OUTPATIENT
Start: 2022-08-30 | End: 2022-08-30

## 2022-08-30 RX ORDER — LIDOCAINE HYDROCHLORIDE 40 MG/ML
SOLUTION TOPICAL ONCE
Status: CANCELLED | OUTPATIENT
Start: 2022-08-30 | End: 2022-08-30

## 2022-08-30 RX ORDER — LIDOCAINE HYDROCHLORIDE 20 MG/ML
JELLY TOPICAL ONCE
Status: COMPLETED | OUTPATIENT
Start: 2022-08-30 | End: 2022-08-30

## 2022-08-30 RX ORDER — LIDOCAINE HYDROCHLORIDE 20 MG/ML
JELLY TOPICAL ONCE
Status: CANCELLED | OUTPATIENT
Start: 2022-08-30 | End: 2022-08-30

## 2022-08-30 RX ORDER — LIDOCAINE 40 MG/G
CREAM TOPICAL ONCE
Status: CANCELLED | OUTPATIENT
Start: 2022-08-30 | End: 2022-08-30

## 2022-08-30 RX ORDER — CLOBETASOL PROPIONATE 0.5 MG/G
OINTMENT TOPICAL ONCE
Status: CANCELLED | OUTPATIENT
Start: 2022-08-30 | End: 2022-08-30

## 2022-08-30 RX ORDER — BACITRACIN ZINC AND POLYMYXIN B SULFATE 500; 1000 [USP'U]/G; [USP'U]/G
OINTMENT TOPICAL ONCE
Status: CANCELLED | OUTPATIENT
Start: 2022-08-30 | End: 2022-08-30

## 2022-08-30 RX ORDER — BACITRACIN, NEOMYCIN, POLYMYXIN B 400; 3.5; 5 [USP'U]/G; MG/G; [USP'U]/G
OINTMENT TOPICAL ONCE
Status: CANCELLED | OUTPATIENT
Start: 2022-08-30 | End: 2022-08-30

## 2022-08-30 RX ORDER — GENTAMICIN SULFATE 1 MG/G
OINTMENT TOPICAL ONCE
Status: CANCELLED | OUTPATIENT
Start: 2022-08-30 | End: 2022-08-30

## 2022-08-30 RX ORDER — GINSENG 100 MG
CAPSULE ORAL ONCE
Status: CANCELLED | OUTPATIENT
Start: 2022-08-30 | End: 2022-08-30

## 2022-08-30 RX ADMIN — LIDOCAINE HYDROCHLORIDE: 20 JELLY TOPICAL at 09:38

## 2022-08-30 ASSESSMENT — PAIN SCALES - GENERAL: PAINLEVEL_OUTOF10: 0

## 2022-08-30 NOTE — PROGRESS NOTES
[address]   Progress Note and Procedure Note      [address] [facility] RECORD NUMBER:  [id number]  AGE: 61 y.o. GENDER: female  : 1955  EPISODE DATE:  2022    Subjective:     Chief Complaint   Patient presents with    Wound Infection     Pt states she might be healed, unnaboot in place to R. leg         HISTORY of PRESENT ILLNESS HPI     Olman Rodriguez is a 61 y.o. female who presents today for wound/ulcer evaluation.    History of Wound Context: right ankle wound follow up/eval and treat    Ulcer Identification:  Ulcer Type: venous  Contributing Factors: venous stasis and obesity    Wound: N/A        PAST MEDICAL HISTORY        Diagnosis Date    Chronic midline low back pain without sciatica 10/2/2017    Essential (primary) hypertension 10/2/2017    Familial hypercholesterolemia 10/2/2017    Generalized anxiety disorder 10/2/2017    Glaucoma     Obstructive sleep apnea syndrome 10/2/2017    Osteoarthritis, knee        PAST SURGICAL HISTORY    Past Surgical History:   Procedure Laterality Date    CHOLECYSTECTOMY      HYSTERECTOMY, TOTAL ABDOMINAL (CERVIX REMOVED)      KNEE ARTHROSCOPY      TUBAL LIGATION         FAMILY HISTORY    Family History   Problem Relation Age of Onset    Heart Disease Mother     Heart Disease Father     Other Maternal Grandmother         aneurysm       SOCIAL HISTORY    Social History     Tobacco Use    Smoking status: Never     Passive exposure: Yes    Smokeless tobacco: Never   Vaping Use    Vaping Use: Never used   Substance Use Topics    Alcohol use: Not Currently       ALLERGIES    Allergies   Allergen Reactions    Ceclor [Cefaclor]     Ciprofloxacin Hcl     Ultram [Tramadol]      fatigue    Zocor [Simvastatin]      cough       MEDICATIONS    Current Outpatient Medications on File Prior to Encounter   Medication Sig Dispense Refill    mupirocin (BACTROBAN) 2 % ointment Apply topically 2 times daily x 5-7 days 30 g 0    ELIQUIS 5 MG TABS tablet TAKE 1 TABLET BY MOUTH TWICE DAILY 60 tablet 2    pravastatin (PRAVACHOL) 10 MG tablet TAKE 1 TABLET BY MOUTH EVERY DAY 90 tablet 3    cyclobenzaprine (FLEXERIL) 10 MG tablet TAKE 1 TABLET BY MOUTH EVERY NIGHT AT BEDTIME AS NEEDED FOR MUSCLE SPASMS 90 tablet 2    sertraline (ZOLOFT) 100 MG tablet TAKE ONE TABLET ONE TIME DAILY 90 tablet 3    metoprolol tartrate (LOPRESSOR) 25 MG tablet Take 1 tablet by mouth 2 times daily 180 tablet 3    losartan (COZAAR) 50 MG tablet TAKE 1 TABLET BY MOUTH ONE TIME A DAY 90 tablet 3    hydrOXYzine (ATARAX) 25 MG tablet TAKE 1-2 TABLETS BY MOUTH TAKE TABLET BY MOUTH EVERY NIGHT AT BEDTIME AS NEEDED FOR INSOMNIA 60 tablet 5    fexofenadine (ALLEGRA) 180 MG tablet Take 180 mg by mouth daily      albuterol sulfate HFA (VENTOLIN HFA) 108 (90 Base) MCG/ACT inhaler Inhale 2 puffs into the lungs 4 times daily as needed for Wheezing 1 Inhaler 0    Latanoprostene Bunod 0.024 % SOLN Apply to eye      Turmeric 450 MG CAPS Take by mouth daily      aspirin 81 MG EC tablet Take 81 mg by mouth daily      Ascorbic Acid (VITAMIN C) 500 MG CAPS Take by mouth      Cholecalciferol (VITAMIN D3) 2000 units CAPS Take by mouth 2 times daily       No current facility-administered medications on file prior to encounter. REVIEW OF SYSTEMS    Pertinent items are noted in HPI.     Objective:      /70   Pulse 78   Temp 96.9 °F (36.1 °C) (Temporal)   Resp 20     Wt Readings from Last 3 Encounters:   08/23/22 262 lb (118.8 kg)   08/19/22 262 lb (118.8 kg)   08/15/22 262 lb (118.8 kg)       PHYSICAL EXAM    General Appearance: alert and oriented to person, place and time, well developed and well- nourished, in no acute distress  Skin: warm and dry, no rash or erythema  Head: normocephalic and atraumatic  Eyes: pupils equal, round, and reactive to light, extraocular eye movements intact, conjunctivae normal  ENT: tympanic membrane, external ear and ear canal normal bilaterally, nose without deformity, nasal mucosa and turbinates normal without polyps  Neck: supple and non-tender without mass, no thyromegaly or thyroid nodules, no cervical lymphadenopathy  Pulmonary/Chest: clear to auscultation bilaterally- no wheezes, rales or rhonchi, normal air movement, no respiratory distress  Extremities: no cyanosis, clubbing or edema  Musculoskeletal: normal range of motion, no joint swelling, deformity or tenderness  Neurologic: reflexes normal and symmetric, no cranial nerve deficit, gait, coordination and speech normal      Assessment:      Patient Active Problem List   Diagnosis Code    Essential (primary) hypertension I10    Bilateral carpal tunnel syndrome G56.03    Generalized anxiety disorder F41.1    Familial hypercholesterolemia E78.01    Chronic midline low back pain without sciatica M54.50, G89.29    Obstructive sleep apnea syndrome G47.33    Recurrent deep vein thrombosis (DVT) ([facility]) I82.409    Recurrent pulmonary embolism ([facility]) I26.99    Osteoarthritis, knee M17.10    Splenic artery aneurysm ([facility]) I72.8    Skin ulcer of right ankle with fat layer exposed ([facility] [address] L97.312    PVD (peripheral vascular disease) ([facility]) I73.9    Class 3 severe obesity due to excess calories with serious comorbidity and body mass index (BMI) of 40.0 to 44.9 in adult ([facility]) E66.01, Z68.41                 Wound 08/15/22 Pretibial Distal;Right wound 1- right ankle venous (Active)   Wound Image   08/30/22 0939   Wound Etiology Venous 08/30/22 0939   Dressing Status Old drainage noted 08/30/22 0939   Wound Cleansed Soap and water 08/30/22 0939   Dressing/Treatment Hydrocolloid 08/30/22 1003   Wound Length (cm) 0 cm 08/30/22 0939   Wound Width (cm) 0 cm 08/30/22 0939   Wound Depth (cm) 0 cm 08/30/22 0939   Wound Surface Area (cm^2) 0 cm^2 08/30/22 0939   Change in Wound Size % (l*w) 100 08/30/22 0939   Wound Volume (cm^3) 0 cm^3 08/30/22 0939   Wound Healing % 100 08/30/22 0939   Post-Procedure Length (cm) 0 cm 08/30/22 1003 Post-Procedure Width (cm) 0 cm 08/30/22 1003   Post-Procedure Depth (cm) 0 cm 08/30/22 1003   Post-Procedure Surface Area (cm^2) 0 cm^2 08/30/22 1003   Post-Procedure Volume (cm^3) 0 cm^3 08/30/22 1003   Distance Tunneling (cm) 0 cm 08/30/22 5443   Tunneling Position ___ O'Clock 0 08/30/22 0939   Undermining Starts ___ O'Clock 0 08/30/22 0939   Undermining Ends___ O'Clock 0 08/30/22 0939   Undermining Maxium Distance (cm) 0 08/30/22 0939   Wound Assessment Epithelialization 08/30/22 0939   Drainage Amount None 08/30/22 0939   Drainage Description Other (Comment) 08/30/22 0939   Odor None 08/30/22 0939   Jaz-wound Assessment Intact 08/30/22 0939   Margins Attached edges 08/30/22 0939   Wound Thickness Description not for Pressure Injury Full thickness 08/26/22 1158   Number of days: 14       Plan:     Problem List Items Addressed This Visit          Circulatory    PVD (peripheral vascular disease) ([facility] [address]    Relevant Orders    Initiate Outpatient Wound Care Protocol       Other    * (Principal) Skin ulcer of right ankle with fat layer exposed ([facility] [address] - Primary    Relevant Orders    Initiate Outpatient Wound Care Protocol    Class 3 severe obesity due to excess calories with serious comorbidity and body mass index (BMI) of 40.0 to 44.9 in [facility])    Relevant Orders    Initiate Outpatient Wound Care Protocol           Treatment Note please see attached Discharge Instructions    In my professional opinion this patient would benefit from HBO Therapy: No    Written patient dismissal instructions given to patient and signed by patient or POA. Ms. Nathan Yanes is healed!   Discharge [address] and Hyperbaric Oxygen Therapy   Physician Orders and Discharge Instructions  [address] [facility] [address] [place]  [place], [address]  Telephone: [phone]    NAME:  Agnesester:  1955  MEDICAL RECORD NUMBER:  [id number]  DATE: @ED@    Discharge condition: Stable    Discharge to: Home    Left via:Private automobile    Accompanied by:  self    ECF/HHA: none     1) Moisturize your  the right ankle and foot with a good thick cream like Eucerin Cream, Aquaphor or Cocoa Butter (in a jar) at least twice daily. Dry skin has a greater likelihood of breaking down if it is stretched by swelling. 2) Elevate legs when sitting, above the heart preferably. Avoid sleeping in a recliner. 3) Avoid standing for long periods of time. 4) Walking is good. 5)  Wear knee high graduated compression stockings 20 mmHg on both legs. (This is what prevents these ulcerations)  Put these on in the morning no more than 20 minutes after rising and remove before going to bed. You can hand wash and hang to dry overnight. We recommend having two pairs to rotate washing and wearing. 6) Be very careful with your legs. Often these are caused by a bump or scrape that doesn't heal well. Healing is prevented or slowed by your Venous insufficiency. Compression and keeping the swelling out of your legs is the key to the condition of your skin. 7) You can buy these stockings  at many pharmacies and even online, but you must have a calf and ankle measurement (taken in the morning before your legs swell) to order them. You will also need the strength that the physician has ordered. Most insurance companies do not pay for these unless you have an open ulceration currently. 8)  Replace your stockings every 6 months as they stretch out and become less effective. 9) If you are overweight, losing weight can be helpful. May use duoderm to cover area to  protect if needed    Follow up with Vascular as scheduled    [address] [place] [address],[address] follow up visit ___________as needed__________________  (Please note your next appointment above and if you are unable to keep, kindly give a 24 hour notice.  Thank you.)          If you experience any of the following, please call the [address] during business hours:    * Increase in Pain  * Temperature over 101  * Increase in drainage from your wound  * Drainage with a foul odor  * Bleeding  * Increase in swelling  * Need for compression bandage changes due to slippage, breakthrough drainage. If you need medical attention outside of the business hours of the [facility] please contact your PCP or go to the nearest emergency room.           Electronically signed by SAQIB Gomez CNP on 8/30/2022 at 10:07 AM

## 2022-09-29 RX ORDER — HYDROXYZINE HYDROCHLORIDE 25 MG/1
TABLET, FILM COATED ORAL
Qty: 60 TABLET | Refills: 5 | Status: SHIPPED | OUTPATIENT
Start: 2022-09-29

## 2022-10-13 RX ORDER — APIXABAN 5 MG/1
TABLET, FILM COATED ORAL
Qty: 60 TABLET | Refills: 5 | Status: SHIPPED | OUTPATIENT
Start: 2022-10-13

## 2022-10-21 DIAGNOSIS — E78.01 FAMILIAL HYPERCHOLESTEROLEMIA: ICD-10-CM

## 2022-10-21 DIAGNOSIS — R53.83 OTHER FATIGUE: ICD-10-CM

## 2022-10-21 LAB
ALBUMIN SERPL-MCNC: 4.4 G/DL (ref 3.5–5.2)
ALP BLD-CCNC: 49 U/L (ref 35–104)
ALT SERPL-CCNC: 26 U/L (ref 5–33)
ANION GAP SERPL CALCULATED.3IONS-SCNC: 9 MMOL/L (ref 7–19)
AST SERPL-CCNC: 27 U/L (ref 5–32)
BASOPHILS ABSOLUTE: 0 K/UL (ref 0–0.2)
BASOPHILS RELATIVE PERCENT: 0.5 % (ref 0–1)
BILIRUB SERPL-MCNC: 0.3 MG/DL (ref 0.2–1.2)
BUN BLDV-MCNC: 12 MG/DL (ref 8–23)
CALCIUM SERPL-MCNC: 9.4 MG/DL (ref 8.8–10.2)
CHLORIDE BLD-SCNC: 106 MMOL/L (ref 98–111)
CHOLESTEROL, TOTAL: 200 MG/DL (ref 160–199)
CO2: 28 MMOL/L (ref 22–29)
CREAT SERPL-MCNC: 0.7 MG/DL (ref 0.5–0.9)
EOSINOPHILS ABSOLUTE: 0.3 K/UL (ref 0–0.6)
EOSINOPHILS RELATIVE PERCENT: 5.4 % (ref 0–5)
GFR SERPL CREATININE-BSD FRML MDRD: >60 ML/MIN/{1.73_M2}
GLUCOSE BLD-MCNC: 107 MG/DL (ref 74–109)
HCT VFR BLD CALC: 45 % (ref 37–47)
HDLC SERPL-MCNC: 56 MG/DL (ref 65–121)
HEMOGLOBIN: 14.2 G/DL (ref 12–16)
IMMATURE GRANULOCYTES #: 0 K/UL
LDL CHOLESTEROL CALCULATED: 105 MG/DL
LYMPHOCYTES ABSOLUTE: 2.3 K/UL (ref 1.1–4.5)
LYMPHOCYTES RELATIVE PERCENT: 40.3 % (ref 20–40)
MCH RBC QN AUTO: 30.1 PG (ref 27–31)
MCHC RBC AUTO-ENTMCNC: 31.6 G/DL (ref 33–37)
MCV RBC AUTO: 95.5 FL (ref 81–99)
MONOCYTES ABSOLUTE: 0.6 K/UL (ref 0–0.9)
MONOCYTES RELATIVE PERCENT: 10 % (ref 0–10)
NEUTROPHILS ABSOLUTE: 2.5 K/UL (ref 1.5–7.5)
NEUTROPHILS RELATIVE PERCENT: 43.6 % (ref 50–65)
PDW BLD-RTO: 13.4 % (ref 11.5–14.5)
PLATELET # BLD: 222 K/UL (ref 130–400)
PMV BLD AUTO: 9.7 FL (ref 9.4–12.3)
POTASSIUM SERPL-SCNC: 4.5 MMOL/L (ref 3.5–5)
RBC # BLD: 4.71 M/UL (ref 4.2–5.4)
SODIUM BLD-SCNC: 143 MMOL/L (ref 136–145)
T4 FREE: 0.85 NG/DL (ref 0.93–1.7)
TOTAL PROTEIN: 6.7 G/DL (ref 6.6–8.7)
TRIGL SERPL-MCNC: 195 MG/DL (ref 0–149)
TSH SERPL DL<=0.05 MIU/L-ACNC: 1.49 UIU/ML (ref 0.27–4.2)
WBC # BLD: 5.8 K/UL (ref 4.8–10.8)

## 2022-10-27 ENCOUNTER — OFFICE VISIT (OUTPATIENT)
Dept: FAMILY MEDICINE CLINIC | Age: 67
End: 2022-10-27
Payer: MEDICARE

## 2022-10-27 VITALS
BODY MASS INDEX: 43.32 KG/M2 | DIASTOLIC BLOOD PRESSURE: 84 MMHG | OXYGEN SATURATION: 98 % | WEIGHT: 260 LBS | HEIGHT: 65 IN | TEMPERATURE: 97 F | HEART RATE: 75 BPM | SYSTOLIC BLOOD PRESSURE: 132 MMHG

## 2022-10-27 DIAGNOSIS — I82.409 RECURRENT DEEP VEIN THROMBOSIS (DVT) (HCC): ICD-10-CM

## 2022-10-27 DIAGNOSIS — F51.01 PRIMARY INSOMNIA: ICD-10-CM

## 2022-10-27 DIAGNOSIS — Z78.0 ASYMPTOMATIC MENOPAUSAL STATE: ICD-10-CM

## 2022-10-27 DIAGNOSIS — I26.99 RECURRENT PULMONARY EMBOLISM (HCC): ICD-10-CM

## 2022-10-27 DIAGNOSIS — Z00.00 WELCOME TO MEDICARE PREVENTIVE VISIT: ICD-10-CM

## 2022-10-27 DIAGNOSIS — E78.00 HYPERCHOLESTEROLEMIA: ICD-10-CM

## 2022-10-27 DIAGNOSIS — I10 ESSENTIAL (PRIMARY) HYPERTENSION: ICD-10-CM

## 2022-10-27 DIAGNOSIS — Z00.00 ENCOUNTER FOR MEDICARE ANNUAL WELLNESS EXAM: Primary | ICD-10-CM

## 2022-10-27 DIAGNOSIS — F41.8 DEPRESSION WITH ANXIETY: ICD-10-CM

## 2022-10-27 PROCEDURE — 1124F ACP DISCUSS-NO DSCNMKR DOCD: CPT | Performed by: FAMILY MEDICINE

## 2022-10-27 PROCEDURE — G0402 INITIAL PREVENTIVE EXAM: HCPCS | Performed by: FAMILY MEDICINE

## 2022-10-27 PROCEDURE — G8427 DOCREV CUR MEDS BY ELIG CLIN: HCPCS | Performed by: FAMILY MEDICINE

## 2022-10-27 PROCEDURE — G8400 PT W/DXA NO RESULTS DOC: HCPCS | Performed by: FAMILY MEDICINE

## 2022-10-27 PROCEDURE — G0403 EKG FOR INITIAL PREVENT EXAM: HCPCS | Performed by: FAMILY MEDICINE

## 2022-10-27 PROCEDURE — 3074F SYST BP LT 130 MM HG: CPT | Performed by: FAMILY MEDICINE

## 2022-10-27 PROCEDURE — 3017F COLORECTAL CA SCREEN DOC REV: CPT | Performed by: FAMILY MEDICINE

## 2022-10-27 PROCEDURE — G8484 FLU IMMUNIZE NO ADMIN: HCPCS | Performed by: FAMILY MEDICINE

## 2022-10-27 PROCEDURE — 1036F TOBACCO NON-USER: CPT | Performed by: FAMILY MEDICINE

## 2022-10-27 PROCEDURE — G8417 CALC BMI ABV UP PARAM F/U: HCPCS | Performed by: FAMILY MEDICINE

## 2022-10-27 PROCEDURE — 99214 OFFICE O/P EST MOD 30 MIN: CPT | Performed by: FAMILY MEDICINE

## 2022-10-27 PROCEDURE — 3078F DIAST BP <80 MM HG: CPT | Performed by: FAMILY MEDICINE

## 2022-10-27 PROCEDURE — 1090F PRES/ABSN URINE INCON ASSESS: CPT | Performed by: FAMILY MEDICINE

## 2022-10-27 RX ORDER — SERTRALINE HYDROCHLORIDE 100 MG/1
150 TABLET, FILM COATED ORAL DAILY
Qty: 135 TABLET | Refills: 3 | Status: SHIPPED | OUTPATIENT
Start: 2022-10-27

## 2022-10-27 ASSESSMENT — PATIENT HEALTH QUESTIONNAIRE - PHQ9
SUM OF ALL RESPONSES TO PHQ QUESTIONS 1-9: 5
SUM OF ALL RESPONSES TO PHQ QUESTIONS 1-9: 5
9. THOUGHTS THAT YOU WOULD BE BETTER OFF DEAD, OR OF HURTING YOURSELF: 0
7. TROUBLE CONCENTRATING ON THINGS, SUCH AS READING THE NEWSPAPER OR WATCHING TELEVISION: 0
SUM OF ALL RESPONSES TO PHQ9 QUESTIONS 1 & 2: 3
3. TROUBLE FALLING OR STAYING ASLEEP: 1
SUM OF ALL RESPONSES TO PHQ QUESTIONS 1-9: 5
6. FEELING BAD ABOUT YOURSELF - OR THAT YOU ARE A FAILURE OR HAVE LET YOURSELF OR YOUR FAMILY DOWN: 0
1. LITTLE INTEREST OR PLEASURE IN DOING THINGS: 2
SUM OF ALL RESPONSES TO PHQ QUESTIONS 1-9: 5
8. MOVING OR SPEAKING SO SLOWLY THAT OTHER PEOPLE COULD HAVE NOTICED. OR THE OPPOSITE, BEING SO FIGETY OR RESTLESS THAT YOU HAVE BEEN MOVING AROUND A LOT MORE THAN USUAL: 0
2. FEELING DOWN, DEPRESSED OR HOPELESS: 1
10. IF YOU CHECKED OFF ANY PROBLEMS, HOW DIFFICULT HAVE THESE PROBLEMS MADE IT FOR YOU TO DO YOUR WORK, TAKE CARE OF THINGS AT HOME, OR GET ALONG WITH OTHER PEOPLE: 1
5. POOR APPETITE OR OVEREATING: 1
4. FEELING TIRED OR HAVING LITTLE ENERGY: 0

## 2022-10-27 ASSESSMENT — LIFESTYLE VARIABLES: HOW OFTEN DO YOU HAVE A DRINK CONTAINING ALCOHOL: NEVER

## 2022-10-27 ASSESSMENT — VISUAL ACUITY
OD_CC: 20/40
OS_CC: 20/40

## 2022-10-27 NOTE — PROGRESS NOTES
[facility] PHYSICIAN SERVICES  [facility] FAM[facility] MEDIC[facility]  [address]  [facility] [place] [address]  Dept: [phone]  Dept Fax: [phone]  Loc: [phone]    Donelda Lombard is a 74 y.o. female who presents today for her medical conditions/complaints as noted below. Donelda Lombard is here for Medicare AWV        HPI:   CC: Here today to discuss the following:    Here for welcome to Medicare exam.    Hypertension  Compliant with medications. No adverse effects from medication. No lightheadedness, palpitations, or chest pain. She continues to take anticoagulation, Eliquis, for recurrent DVT. No bleeding issues. Insomnia  Currently stable. Satisfied with current treatment regimen. No adverse effects from medication. Hyperlipidemia  Tolerating current cholesterol medication without side effects. . Attempting to reduce processed sugar and cholesterol from diet. She is requesting her sertraline be increased from 100 mg daily to 150 mg daily. She has multiple stressors in her life including poor health and family members. 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 Her  is very ill as well. HPI    Subjective:      Review of Systems  Review of Systems   Constitutional: Negative for chills and fever. HENT: Negative for congestion. Respiratory: Negative for cough, chest tightness and shortness of breath. Cardiovascular: Negative for chest pain, palpitations and leg swelling. Gastrointestinal: Negative for abdominal pain, anal bleeding, constipation, diarrhea and nausea. Genitourinary: Negative for difficulty urinating. Psychiatric/Behavioral: Negative. SeeHPI for visit specific review of symptoms.   All others negative   Office Visit from 10/27/2022 in [address]    10/27/2022    1125   PHQ-9     Little interest or pleasure in doing things More than half the days   Feeling down, depressed, or hopeless Several days   Trouble falling or staying asleep, or sleeping too much Several days   Feeling tired or having little energy Not at all   Poor appetite or overeating Several days   Feeling bad about yourself - or that you are a failure or have let yourself or your family down Not at all   Trouble concentrating on things, such as reading the newspaper or watching television Not at all   Moving or speaking so slowly that other people could have noticed. Or the opposite - being so fidgety or restless that you have been moving around a lot more than usual Not at all   Thoughts that you would be better off dead, or of hurting yourself in some way Not at all   PHQ-9 Total Score 5   If you checked off any problems, how difficult have these problems made it for you to do your work, take care of things at home, or get along with other people? Somewhat difficult       Objective:   /84   Pulse 75   Temp 97 °F (36.1 °C)   Ht 5' 5\" (1.651 m)   Wt 260 lb (117.9 kg)   SpO2 98%   BMI 43.27 kg/m²   Physical Exam  Physical Exam   Constitutional: She appears well-developed. Does not appear ill. Neck: Neck supple. No masses. Neck Symmetric. Normal tracheal position. No thyroid enlargement  Cardiovascular: Normal rate and regular rhythm. Exam reveals no friction rub. No murmur heard. Respiratory:  Effort normal and breath sounds normal. No respiratory distress. No wheezes. No rales. No use of accessory muscles or intercostal retractions. Neurological: alert. Psychiatric: normal mood and affect.  Her behavior is normal.       Recent Results (from the past 672 hour(s))   TSH without Reflex    Collection Time: 10/21/22  8:50 AM   Result Value Ref Range    TSH 1.490 0.270 - 4.200 uIU/mL   T4, Free    Collection Time: 10/21/22  8:50 AM   Result Value Ref Range    T4 Free 0.85 (L) 0.93 - 1.70 ng/dL   CBC Auto Differential    Collection Time: 10/21/22  8:50 AM   Result Value Ref Range    WBC 5.8 4.8 - 10.8 K/uL    RBC 4.71 4.20 - 5.40 M/uL    Hemoglobin 14.2 12.0 - 16.0 g/dL    Hematocrit 45.0 37.0 - 47.0 %    MCV 95.5 81.0 - 99.0 fL    MCH 30.1 27.0 - 31.0 pg    MCHC 31.6 (L) 33.0 - 37.0 g/dL    RDW 13.4 11.5 - 14.5 %    Platelets 602 305 - 670 K/uL    MPV 9.7 9.4 - 12.3 fL    Neutrophils % 43.6 (L) 50.0 - 65.0 %    Lymphocytes % 40.3 (H) 20.0 - 40.0 %    Monocytes % 10.0 0.0 - 10.0 %    Eosinophils % 5.4 (H) 0.0 - 5.0 %    Basophils % 0.5 0.0 - 1.0 %    Neutrophils Absolute 2.5 1.5 - 7.5 K/uL    Immature Granulocytes # 0.0 K/uL    Lymphocytes Absolute 2.3 1.1 - 4.5 K/uL    Monocytes Absolute 0.60 0.00 - 0.90 K/uL    Eosinophils Absolute 0.30 0.00 - 0.60 K/uL    Basophils Absolute 0.00 0.00 - 0.20 K/uL   Lipid Panel    Collection Time: 10/21/22  8:50 AM   Result Value Ref Range    Cholesterol, Total 200 (H) 160 - 199 mg/dL    Triglycerides 195 (H) 0 - 149 mg/dL    HDL 56 (L) 65 - 121 mg/dL    LDL Calculated 105 <100 mg/dL   Comprehensive Metabolic Panel    Collection Time: 10/21/22  8:50 AM   Result Value Ref Range    Sodium 143 136 - 145 mmol/L    Potassium 4.5 3.5 - 5.0 mmol/L    Chloride 106 98 - 111 mmol/L    CO2 28 22 - 29 mmol/L    Anion Gap 9 7 - 19 mmol/L    Glucose 107 74 - 109 mg/dL    BUN 12 8 - 23 mg/dL    Creatinine 0.7 0.5 - 0.9 mg/dL    Est, Glom Filt Rate >60 >60    Calcium 9.4 8.8 - 10.2 mg/dL    Total Protein 6.7 6.6 - 8.7 g/dL    Albumin 4.4 3.5 - 5.2 g/dL    Total Bilirubin 0.3 0.2 - 1.2 mg/dL    Alkaline Phosphatase 49 35 - 104 U/L    ALT 26 5 - 33 U/L    AST 27 5 - 32 U/L       EKG normal sinus rhythm        Assessment & Plan: The following diagnoses and conditions are stable with no further orders unless indicated:  1. Encounter for Medicare annual wellness exam  2. Welcome to Medicare preventive visit    - EKG 12 Lead - Clinic Performed    3. Asymptomatic menopausal state    - DEXA BONE DENSITY 2 SITES; Future    4.  Essential (primary) hypertension  BP Readings from Last 3 Encounters:   10/27/22 132/84 08/30/22 126/70   08/26/22 116/70     Blood pressure stable continue current medication    5. Recurrent deep vein thrombosis (DVT) (HCC)  Eliquis    6. Hypercholesterolemia  Lab Results   Component Value Date    CHOL 200 (H) 10/21/2022    CHOL 201 (H) 12/13/2021    CHOL 223 (H) 09/14/2021     Lab Results   Component Value Date    TRIG 195 (H) 10/21/2022    TRIG 202 (H) 12/13/2021    TRIG 193 (H) 09/14/2021     Lab Results   Component Value Date    HDL 56 (L) 10/21/2022    HDL 58 (L) 12/13/2021    HDL 57 (L) 09/14/2021     Lab Results   Component Value Date    LDLCALC 105 10/21/2022    LDLCALC 103 12/13/2021    LDLCALC 127 09/14/2021     No results found for: LABVLDL, VLDL  No results found for: CHOLHDLRATIO  Lifestyle changes  overall LDL is stable. 7. Depression with anxiety  Increase sertraline 150 mg daily    8. Primary insomnia  Continue with hydroxyzine    9. Recurrent pulmonary embolism ([address]  Eliquis        Orders Placed This Encounter   Procedures    DEXA BONE DENSITY 2 SITES     Standing Status:   Future     Standing Expiration Date:   10/27/2023    EKG 12 Lead - Clinic Performed     Order Specific Question:   Reason for Exam?     Answer: Other         Return in about 6 months (around 4/27/2023) for Routine follow up - 20 minutes. Discussed use, benefit, and side effects of prescribed medications. All patient questions answered. Pt voiced understanding. Reviewed health maintenance. Instructedto continue current medications, diet and exercise. Patient agreed with treatmentplan.  Follow up as directed.     _______________________________________________________________      Past Medical History:   Diagnosis Date    Chronic midline low back pain without sciatica 10/2/2017    Essential (primary) hypertension 10/2/2017    Familial hypercholesterolemia 10/2/2017    Generalized anxiety disorder 10/2/2017    Glaucoma     Obstructive sleep apnea syndrome 10/2/2017    Osteoarthritis, knee Past Surgical History:   Procedure Laterality Date    CHOLECYSTECTOMY      HYSTERECTOMY, TOTAL ABDOMINAL (CERVIX REMOVED)      KNEE ARTHROSCOPY      TUBAL LIGATION         Family History   Problem Relation Age of Onset    Heart Disease Mother     Heart Disease Father     Other Maternal Grandmother         aneurysm       Social History     Tobacco Use    Smoking status: Never     Passive exposure: Yes    Smokeless tobacco: Never   Substance Use Topics    Alcohol use: Not Currently     Current Outpatient Medications   Medication Sig Dispense Refill    sertraline (ZOLOFT) 100 MG tablet Take 1.5 tablets by mouth daily 135 tablet 3    ELIQUIS 5 MG TABS tablet TAKE 1 TABLET BY MOUTH TWICE DAILY 60 tablet 5    hydrOXYzine HCl (ATARAX) 25 MG tablet TAKE 1 TO 2 TABLETS BY MOUTH EVERY NIGHT AT BEDTIME AS NEEDED FOR INSOMNIA 60 tablet 5    mupirocin (BACTROBAN) 2 % ointment Apply topically 2 times daily x 5-7 days 30 g 0    pravastatin (PRAVACHOL) 10 MG tablet TAKE 1 TABLET BY MOUTH EVERY DAY 90 tablet 3    cyclobenzaprine (FLEXERIL) 10 MG tablet TAKE 1 TABLET BY MOUTH EVERY NIGHT AT BEDTIME AS NEEDED FOR MUSCLE SPASMS 90 tablet 2    metoprolol tartrate (LOPRESSOR) 25 MG tablet Take 1 tablet by mouth 2 times daily 180 tablet 3    losartan (COZAAR) 50 MG tablet TAKE 1 TABLET BY MOUTH ONE TIME A DAY 90 tablet 3    fexofenadine (ALLEGRA) 180 MG tablet Take 180 mg by mouth daily      albuterol sulfate HFA (VENTOLIN HFA) 108 (90 Base) MCG/ACT inhaler Inhale 2 puffs into the lungs 4 times daily as needed for Wheezing 1 Inhaler 0    Latanoprostene Bunod 0.024 % SOLN Apply to eye      Turmeric 450 MG CAPS Take by mouth daily      aspirin 81 MG EC tablet Take 81 mg by mouth daily      Ascorbic Acid (VITAMIN C) 500 MG CAPS Take by mouth      Cholecalciferol (VITAMIN D3) 2000 units CAPS Take by mouth 2 times daily       No current facility-administered medications for this visit.      Allergies   Allergen Reactions    Ceclor [Cefaclor] Ciprofloxacin Hcl     Ultram [Tramadol]      fatigue    Zocor [Simvastatin]      cough       Health Maintenance   Topic Date Due    HIV screen  Never done    DEXA (modify frequency per FRAX score)  Never done    Colorectal Cancer Screen  10/15/2022    Annual Wellness Visit (AWV)  10/27/2022    Shingles vaccine (1 of 2) 10/27/2023 (Originally 10/23/2007)    Breast cancer screen  12/30/2022    Lipids  10/21/2023    Depression Monitoring  10/27/2023    Pneumococcal 65+ years Vaccine (2 - PPSV23 if available, else PCV20) 10/27/2023    Diabetes screen  04/05/2024    DTaP/Tdap/Td vaccine (2 - Td or Tdap) 05/01/2028    Flu vaccine  Completed    COVID-19 Vaccine  Completed    Hepatitis C screen  Completed    Hepatitis A vaccine  Aged Out    Hib vaccine  Aged Out    Meningococcal (ACWY) vaccine  Aged Out       _______________________________________________________________    Note dictated using Dragon Dictation software  Sometimes this dictation software makes erroneous transcriptions.

## 2022-10-27 NOTE — PROGRESS NOTES
Medicare Annual Wellness Visit - Subsequent  Vision Screening    Right eye Left eye Both eyes   Without correction      With correction 20/40 20/40 20/40       Name: Ap Shay Date: 10/27/2022   MRN: [id number] Sex: Female   Age: 74 y.o. Ethnicity: Non- / Non    : 1955 Race: White (non-)      Domonique Bassett is here for   Chief Complaint   Patient presents with    Medicare AWV        Screenings for behavioral, psychosocial and functional/safety risks, and cognitive dysfunction are all negative except as indicated below. These results, as well as other patient data from the [address] form, are documented in Flowsheets linked to this Encounter. Allergies   Allergen Reactions    Ceclor [Cefaclor]     Ciprofloxacin Hcl     Ultram [Tramadol]      fatigue    Zocor [Simvastatin]      cough       Prior to Visit Medications    Medication Sig Taking?  Authorizing Provider   ELIQUIS 5 MG TABS tablet TAKE 1 TABLET BY MOUTH TWICE DAILY Yes Kimo Montano MD   hydrOXYzine HCl (ATARAX) 25 MG tablet TAKE 1 TO 2 TABLETS BY MOUTH EVERY NIGHT AT BEDTIME AS NEEDED FOR INSOMNIA Yes Kimo Montano MD   mupirocin (BACTROBAN) 2 % ointment Apply topically 2 times daily x 5-7 days Yes SAQIB Hook   pravastatin (PRAVACHOL) 10 MG tablet TAKE 1 TABLET BY MOUTH EVERY DAY Yes Kimo Montano MD   cyclobenzaprine (FLEXERIL) 10 MG tablet TAKE 1 TABLET BY MOUTH EVERY NIGHT AT BEDTIME AS NEEDED FOR MUSCLE SPASMS Yes Kimo Montano MD   sertraline (ZOLOFT) 100 MG tablet TAKE ONE TABLET ONE TIME DAILY Yes Kimo Montano MD   metoprolol tartrate (LOPRESSOR) 25 MG tablet Take 1 tablet by mouth 2 times daily Yes SAQIB Bales - NP   losartan (COZAAR) 50 MG tablet TAKE 1 TABLET BY MOUTH ONE TIME A DAY Yes Kimo Montano MD   fexofenadine (ALLEGRA) 180 MG tablet Take 180 mg by mouth daily Yes Historical Provider, MD   albuterol sulfate HFA (VENTOLIN HFA) 108 (90 Base) MCG/ACT inhaler Inhale 2 puffs into the lungs 4 times daily as needed for Wheezing Yes Leonel Lucero APRN - CNP   Latanoprostene Bunod 0.024 % SOLN Apply to eye Yes Historical Provider, MD   Turmeric 450 MG CAPS Take by mouth daily Yes Historical Provider, MD   aspirin 81 MG EC tablet Take 81 mg by mouth daily Yes Historical Provider, MD   Ascorbic Acid (VITAMIN C) 500 MG CAPS Take by mouth Yes Historical Provider, MD   Cholecalciferol (VITAMIN D3) 2000 units CAPS Take by mouth 2 times daily Yes Historical Provider, MD         Past Medical History:   Diagnosis Date    Chronic midline low back pain without sciatica 10/2/2017    Essential (primary) hypertension 10/2/2017    Familial hypercholesterolemia 10/2/2017    Generalized anxiety disorder 10/2/2017    Glaucoma     Obstructive sleep apnea syndrome 10/2/2017    Osteoarthritis, knee          Past Surgical History:   Procedure Laterality Date    CHOLECYSTECTOMY      HYSTERECTOMY, TOTAL ABDOMINAL (CERVIX REMOVED)      KNEE ARTHROSCOPY      TUBAL LIGATION         Family History   Problem Relation Age of Onset    Heart Disease Mother     Heart Disease Father     Other Maternal Grandmother         aneurysm       CareTeam (Including outside providers/suppliers regularly involved in providing care):   Patient Care Team:  Kimo Montano MD as PCP - General (Family Medicine)  Kimo Montano MD as PCP - [facility] Empaneled Provider  Cornelio Salguero DO as Consulting Physician (Vascular Surgery)  SAQIB Bales - NP as Nurse Practitioner (Nurse Practitioner Adult Health)    Wt Readings from Last 3 Encounters:   10/27/22 260 lb (117.9 kg)   08/23/22 262 lb (118.8 kg)   08/19/22 262 lb (118.8 kg)     Vitals:    10/27/22 1117   BP: 132/84   Pulse: 75   Temp: 97 °F (36.1 °C)   SpO2: 98%   Weight: 260 lb (117.9 kg)   Height: 5' 5\" (1.651 m)           The following problems were reviewed today and where indicated follow up appointments were made and/or referrals ordered.     Risk Factor Screenings with Interventions     Fall Risk:  2 or more falls in past year?: no  Fall with injury in past year?: no  Fall Risk Interventions:    Not indicated     Depression:  PHQ-2 Score: 3  Depression Interventions:  Not indicated     Anxiety:     Anxiety Interventions:  Not indicated     Cognitive:  Clock Drawing Test (CDT): Normal  Cognitive Impairment Interventions:  Not indicated     Substance Abuse:  Social History     Socioeconomic History    Marital status:      Spouse name: Not on file    Number of children: Not on file    Years of education: Not on file    Highest education level: Not on file   Occupational History    Not on file   Tobacco Use    Smoking status: Never     Passive exposure: Yes    Smokeless tobacco: Never   Vaping Use    Vaping Use: Never used   Substance and Sexual Activity    Alcohol use: Not Currently    Drug use: Not on file    Sexual activity: Not on file   Other Topics Concern    Not on file   Social History Narrative    Not on file     Social Determinants of Health     Financial Resource Strain: Not on file   Food Insecurity: Not on file   Transportation Needs: Not on file   Physical Activity: Insufficiently Active    Days of Exercise per Week: 1 day    Minutes of Exercise per Session: 10 min   Stress: Not on file   Social Connections: Not on file   Intimate Partner Violence: Not on file   Housing Stability: Not on file        Substance Abuse Interventions:  Not indicated     Health Risk Assessment:     General  In general, how would you say your health is?: Fair  In the past 7 days, have you experienced any of the following: New or Increased Pain, New or Increased Fatigue, Loneliness, Social Isolation, Stress or Anger?: (!) Yes  Select all that apply: (!) New or Increased Fatigue, Stress  Do you get the social and emotional support that you need?: Yes  General Health Risk Interventions:  See progress notes    Health Habits/Nutrition  On average, how many days per week do you engage in moderate to strenuous exercise (like a brisk walk)?: 1 day  On average, how many minutes do you engage in exercise at this level?: 10 min  Have you lost any weight without trying in the past 3 months?: No  Have you seen the dentist within the past year?: (!) No  Body mass index is 43.27 kg/m².   Health Habits/Nutrition Interventions:  Not indicated    Hearing/Vision  Do you or your family notice any trouble with your hearing that hasn't been managed with hearing aids?: No  Do you have difficulty driving, watching TV, or doing any of your daily activities because of your eyesight?: No  Have you had an eye exam within the past year?: Yes  Hearing/Vision Interventions:  Not indicated     Safety  Do you have working smoke detectors?: Yes  Do you have any tripping hazards - loose or unsecured carpets or rugs?: No  Do you have any tripping hazards - clutter in doorways, halls, or stairs?: (!) Yes  Do you have either shower bars, grab bars, non-slip mats or non-slip surfaces in your shower or bathtub?: Yes  Do all of your stairways have a railing or banister?: Yes  Do you always fasten your seatbelt when you are in a car?: Yes  Safety Interventions:  Home safety tips provided    ADLs  In the past 7 days, did you need help from others to perform any of the following everyday activities: Eating, dressing, grooming, bathing, toileting, or walking/balance?: No  In the past 7 days, did you need help from others to take care of any of the following: Laundry, housekeeping, banking/finances, shopping, telephone use, food preparation, transportation, or taking medications?: No  ADL Interventions:  Not indicated     Personalized Preventive Plan   Current Health Maintenance Status  Immunization History   Administered Date(s) Administered    COVID-19, MODERNA BLUE border, Primary or Immunocompromised, (age 12y+), IM, 100 mcg/0.5mL 12/20/2020, 01/20/2021    COVID-19, PFIZER Bivalent BOOSTER, (age 12y+), IM, 30 mcg/0.3 mL dose 10/17/2022    COVID-19, PFIZER PURPLE top, DILUTE for use, (age 15 y+), 30mcg/0.3mL 11/19/2021    Influenza Vaccine, unspecified formulation 10/01/2018    Influenza Virus Vaccine 09/28/2018, 10/24/2019, 10/15/2020, 10/26/2021, 11/30/2021    Tdap (Boostrix, Adacel) 05/01/2018        Health Maintenance   Topic Date Due    HIV screen  Never done    DEXA (modify frequency per FRAX score)  Never done    Pneumococcal 65+ years Vaccine (1 - PCV) Never done    Colorectal Cancer Screen  10/15/2022    Shingles vaccine (1 of 2) 10/27/2023 (Originally 10/23/2007)    Breast cancer screen  12/30/2022    Depression Monitoring  08/03/2023    Lipids  10/21/2023    Diabetes screen  04/05/2024    DTaP/Tdap/Td vaccine (2 - Td or Tdap) 05/01/2028    Flu vaccine  Completed    COVID-19 Vaccine  Completed    Hepatitis C screen  Completed    Hepatitis A vaccine  Aged Out    Hib vaccine  Aged Out    Meningococcal (ACWY) vaccine  Aged Out       Recommendations for Radio Waves Due: see orders.   Recommended screening schedule for the next 5-10 years is provided to the patient in written form: see Patient Instructions/AVS.

## 2022-10-29 PROBLEM — L97.312 SKIN ULCER OF RIGHT ANKLE WITH FAT LAYER EXPOSED (HCC): Status: RESOLVED | Noted: 2022-08-15 | Resolved: 2022-10-29

## 2022-11-13 DIAGNOSIS — I10 ESSENTIAL (PRIMARY) HYPERTENSION: ICD-10-CM

## 2022-11-14 RX ORDER — LOSARTAN POTASSIUM 50 MG/1
TABLET ORAL
Qty: 90 TABLET | Refills: 3 | Status: SHIPPED | OUTPATIENT
Start: 2022-11-14

## 2022-12-11 DIAGNOSIS — M17.0 PRIMARY OSTEOARTHRITIS OF BOTH KNEES: ICD-10-CM

## 2022-12-11 DIAGNOSIS — G89.29 CHRONIC MIDLINE LOW BACK PAIN WITHOUT SCIATICA: ICD-10-CM

## 2022-12-11 DIAGNOSIS — M54.50 CHRONIC MIDLINE LOW BACK PAIN WITHOUT SCIATICA: ICD-10-CM

## 2022-12-12 RX ORDER — CYCLOBENZAPRINE HCL 10 MG
TABLET ORAL
Qty: 90 TABLET | Refills: 2 | Status: SHIPPED | OUTPATIENT
Start: 2022-12-12

## 2023-02-13 ENCOUNTER — TELEPHONE (OUTPATIENT)
Dept: VASCULAR SURGERY | Age: 68
End: 2023-02-13

## 2023-02-13 ENCOUNTER — HOSPITAL ENCOUNTER (OUTPATIENT)
Dept: ULTRASOUND IMAGING | Age: 68
Discharge: HOME | End: 2023-02-13
Payer: MEDICARE

## 2023-02-13 DIAGNOSIS — Z78.0 ASYMPTOMATIC MENOPAUSAL STATE: ICD-10-CM

## 2023-02-13 PROCEDURE — 77080 DXA BONE DENSITY AXIAL: CPT

## 2023-02-14 ENCOUNTER — HOSPITAL ENCOUNTER (OUTPATIENT)
Dept: CT IMAGING | Age: 68
Discharge: HOME | End: 2023-02-14
Payer: MEDICARE

## 2023-02-14 ENCOUNTER — OFFICE VISIT (OUTPATIENT)
Dept: VASCULAR SURGERY | Age: 68
End: 2023-02-14
Payer: MEDICARE

## 2023-02-14 VITALS
DIASTOLIC BLOOD PRESSURE: 82 MMHG | HEIGHT: 65 IN | WEIGHT: 260 LBS | TEMPERATURE: 97.5 F | HEART RATE: 97 BPM | SYSTOLIC BLOOD PRESSURE: 142 MMHG | OXYGEN SATURATION: 99 % | BODY MASS INDEX: 43.32 KG/M2

## 2023-02-14 DIAGNOSIS — M79.605 LEG PAIN, LEFT: ICD-10-CM

## 2023-02-14 DIAGNOSIS — M79.89 LEG SWELLING: ICD-10-CM

## 2023-02-14 DIAGNOSIS — I72.8 SPLENIC ARTERY ANEURYSM (HCC): Primary | ICD-10-CM

## 2023-02-14 DIAGNOSIS — I72.8 SPLENIC ARTERY ANEURYSM (HCC): ICD-10-CM

## 2023-02-14 DIAGNOSIS — M79.604 LEG PAIN, RIGHT: ICD-10-CM

## 2023-02-14 PROCEDURE — 1036F TOBACCO NON-USER: CPT | Performed by: NURSE PRACTITIONER

## 2023-02-14 PROCEDURE — 3077F SYST BP >= 140 MM HG: CPT | Performed by: NURSE PRACTITIONER

## 2023-02-14 PROCEDURE — G8484 FLU IMMUNIZE NO ADMIN: HCPCS | Performed by: NURSE PRACTITIONER

## 2023-02-14 PROCEDURE — G8427 DOCREV CUR MEDS BY ELIG CLIN: HCPCS | Performed by: NURSE PRACTITIONER

## 2023-02-14 PROCEDURE — G8417 CALC BMI ABV UP PARAM F/U: HCPCS | Performed by: NURSE PRACTITIONER

## 2023-02-14 PROCEDURE — 1090F PRES/ABSN URINE INCON ASSESS: CPT | Performed by: NURSE PRACTITIONER

## 2023-02-14 PROCEDURE — 74176 CT ABD & PELVIS W/O CONTRAST: CPT

## 2023-02-14 PROCEDURE — G8400 PT W/DXA NO RESULTS DOC: HCPCS | Performed by: NURSE PRACTITIONER

## 2023-02-14 PROCEDURE — 1124F ACP DISCUSS-NO DSCNMKR DOCD: CPT | Performed by: NURSE PRACTITIONER

## 2023-02-14 PROCEDURE — 3078F DIAST BP <80 MM HG: CPT | Performed by: NURSE PRACTITIONER

## 2023-02-14 PROCEDURE — 3017F COLORECTAL CA SCREEN DOC REV: CPT | Performed by: NURSE PRACTITIONER

## 2023-02-14 PROCEDURE — 99213 OFFICE O/P EST LOW 20 MIN: CPT | Performed by: NURSE PRACTITIONER

## 2023-02-14 NOTE — PROGRESS NOTES
Travis Rowe (:  1955) is a 74 y.o. female,Established patient, here for evaluation of the following chief complaint(s):  Follow-up (Follow up CT Abodmen/Pelvis. )            SUBJECTIVE/OBJECTIVE:  She has a known history of splenic artery aneurysm for 1 - 5 years. She has not had abdominal pain. She has not had back pain in the cervical spine, thoracic spine, lumbar spine, and sacral spine region. This pain is unchanged since the last visit. Pain is rated as 0. She has a known history of of varicose veins and suspected venous insuffciency. She reports prominent veins on the  Bilateral leg(s), lower extremity edema on the  Bilateral leg(s), and the veins are painful -- severity:  moderate. She reports previous treatment includes prescription compression stockings for > 6 months. She does not have a history of spontaneous rupture. She has a hx of right gaiter zone ulcer which is now healed    Differential diagnosis for leg pain includes but is not limited to: varicose veins, peripheral vascular disease, arthritic pain, DVT, lumbar disc disease, and neurogenic pain.        Travis Rowe is a 74 y.o. female with the following history as recorded in [facility]:  Patient Active Problem List    Diagnosis Date Noted    PVD (peripheral vascular disease) ([facility] [address] 08/15/2022    Class 3 severe obesity due to excess calories with serious comorbidity and body mass index (BMI) of 40.0 to 44.9 in adult [facility]) 08/15/2022    Splenic artery aneurysm ([facility] [address] 2020    Osteoarthritis, knee     Essential (primary) hypertension 10/02/2017    Bilateral carpal tunnel syndrome 10/02/2017    Generalized anxiety disorder 10/02/2017    Familial hypercholesterolemia 10/02/2017    Chronic midline low back pain without sciatica 10/02/2017    Obstructive sleep apnea syndrome 10/02/2017    Recurrent deep vein thrombosis (DVT) ([address] 10/02/2017    Recurrent pulmonary embolism ([address] 10/02/2017     Current Outpatient Medications   Medication Sig Dispense Refill    cyclobenzaprine (FLEXERIL) 10 MG tablet TAKE 1 TABLET BY MOUTH EVERY NIGHT AT BEDTIME AS NEEDED FOR MUSCLE SPASMS 90 tablet 2    losartan (COZAAR) 50 MG tablet TAKE 1 TABLET BY MOUTH EVERY DAY 90 tablet 3    sertraline (ZOLOFT) 100 MG tablet Take 1.5 tablets by mouth daily 135 tablet 3    ELIQUIS 5 MG TABS tablet TAKE 1 TABLET BY MOUTH TWICE DAILY 60 tablet 5    hydrOXYzine HCl (ATARAX) 25 MG tablet TAKE 1 TO 2 TABLETS BY MOUTH EVERY NIGHT AT BEDTIME AS NEEDED FOR INSOMNIA 60 tablet 5    mupirocin (BACTROBAN) 2 % ointment Apply topically 2 times daily x 5-7 days 30 g 0    pravastatin (PRAVACHOL) 10 MG tablet TAKE 1 TABLET BY MOUTH EVERY DAY 90 tablet 3    metoprolol tartrate (LOPRESSOR) 25 MG tablet Take 1 tablet by mouth 2 times daily 180 tablet 3    fexofenadine (ALLEGRA) 180 MG tablet Take 180 mg by mouth daily      albuterol sulfate HFA (VENTOLIN HFA) 108 (90 Base) MCG/ACT inhaler Inhale 2 puffs into the lungs 4 times daily as needed for Wheezing 1 Inhaler 0    Latanoprostene Bunod 0.024 % SOLN Apply to eye      Turmeric 450 MG CAPS Take by mouth daily      aspirin 81 MG EC tablet Take 81 mg by mouth daily      Ascorbic Acid (VITAMIN C) 500 MG CAPS Take by mouth      Cholecalciferol (VITAMIN D3) 2000 units CAPS Take by mouth 2 times daily       No current facility-administered medications for this visit.      Allergies: Ceclor [cefaclor], Ciprofloxacin hcl, Ultram [tramadol], and Zocor [simvastatin]  Past Medical History:   Diagnosis Date    Chronic midline low back pain without sciatica 10/2/2017    Essential (primary) hypertension 10/2/2017    Familial hypercholesterolemia 10/2/2017    Generalized anxiety disorder 10/2/2017    Glaucoma     Obstructive sleep apnea syndrome 10/2/2017    Osteoarthritis, knee      Past Surgical History:   Procedure Laterality Date    CHOLECYSTECTOMY      HYSTERECTOMY, TOTAL ABDOMINAL (CERVIX REMOVED)      KNEE ARTHROSCOPY      TUBAL LIGATION       Family History   Problem Relation Age of Onset    Heart Disease Mother     Heart Disease Father     Other Maternal Grandmother         aneurysm     Social History     Tobacco Use    Smoking status: Never     Passive exposure: Yes    Smokeless tobacco: Never   Substance Use Topics    Alcohol use: Not Currently       ROS  Eyes - no sudden vision change or amaurosis. Respiratory - no significant shortness of breath,  Cardiovascular - no chest pain or syncope. No  significant leg swelling. no claudication. Musculoskeletal - no gait disturbance  Skin - no new wound. Neurologic -  No speech difficulty or lateralizing weakness. All other review of systems are negative. Physical Exam    BP (!) 142/82 (Site: Right Upper Arm, Position: Sitting, Cuff Size: Medium Adult)   Pulse 97   Temp 97.5 °F (36.4 °C)   Ht 5' 5\" (1.651 m)   Wt 260 lb (117.9 kg)   SpO2 99%   BMI 43.27 kg/m²       Neck- carotid pulses 2+ to palpation with no bruit  Cardiovascular - Regular rate and rhythm. Pulmonary - effort appears normal.  No respiratory distress. Lungs - Breath sounds normal. No wheezes or rales. GI - Abdomen - soft, non tender, bowel sounds X 4 quadrants. No guarding or rebound tenderness. No distension or palpable mass. Extremities - Radial and brachial pulses are 2+ to palpation bilaterally. Right femoral pulse: present 2+; Right popliteal pulse: absent Right DP: absent; Right PT present 2+; Left femoral pulse: present 2+; Left popliteal pulse: absent; Left DP: absent; Left PT: present 2+ No cyanosis, clubbing, or significant edema. No signs atheroembolic event. has evidence of healed ulcerations. Hyperpigmentation at gaiter zone bilaterally  Neurologic - alert and oriented X 3. Physiologic. Face symmetric. Skin - warm, dry, and intact. no wound  Psychiatric - mood, affect, and behavior appear normal.  Judgment and thought processes appear normal.      CT - 1. No acute abnormality identified within the abdomen or pelvis. 2.Maynard, peripherally calcified splenic artery aneurysm measures      approximately 2 x 1.5 cm. This is not significantly changed since      12/10/2021. 3.Colonic diverticulosis  after I individually reviewed the images, I see not change from the previous study  Individual films reviewed:  Yes. These results have been reviewed with the patient. Disease process is stable and chronic      Reviewed previous studies including: CT scan  Individual images were reviewed. I agree with the findings  Results were discussed with the patient. ASSESSMENT/PLAN:  1. Splenic artery aneurysm (HCC)  -     CT ABDOMEN PELVIS WO CONTRAST Additional Contrast? None; Future  2. Leg swelling  -     VL Extremity Venous Bilateral; Future  3. Leg pain, right  -     VL Extremity Venous Bilateral; Future  4. Leg pain, left  -     VL Extremity Venous Bilateral; Future      Proceed with venous scan for reflux  Continue support hose 30-40 mm hg compression  Elevate  Exercise  Call with new ulceration  Leg elevation  Symptoms of rupture reviewed with the patient including sudden onset severe back pain or abdominal pain. This pain can sometimes radiate into the groin or leg. The patient may experience a feeling of impending doom or death. If this occurs they have been insturcted to call 911 and get to the emergency room telling them you have an aneurysm. Patient has voiced understanding. May need ablation right leg                An electronic signature was used to authenticate this note.     --Abril Catherine, SAQIB

## 2023-03-12 ENCOUNTER — APPOINTMENT (OUTPATIENT)
Dept: GENERAL RADIOLOGY | Age: 68
End: 2023-03-12
Payer: MEDICARE

## 2023-03-12 ENCOUNTER — HOSPITAL ENCOUNTER (EMERGENCY)
Age: 68
Discharge: HOME | End: 2023-03-12
Payer: MEDICARE

## 2023-03-12 VITALS
HEART RATE: 108 BPM | RESPIRATION RATE: 16 BRPM | SYSTOLIC BLOOD PRESSURE: 142 MMHG | BODY MASS INDEX: 47.84 KG/M2 | TEMPERATURE: 98.5 F | DIASTOLIC BLOOD PRESSURE: 82 MMHG | WEIGHT: 260 LBS | OXYGEN SATURATION: 95 % | HEIGHT: 62 IN

## 2023-03-12 DIAGNOSIS — M25.562 ACUTE PAIN OF LEFT KNEE: Primary | ICD-10-CM

## 2023-03-12 PROCEDURE — 6370000000 HC RX 637 (ALT 250 FOR IP): Performed by: NURSE PRACTITIONER

## 2023-03-12 PROCEDURE — 93971 EXTREMITY STUDY: CPT

## 2023-03-12 PROCEDURE — 99284 EMERGENCY DEPT VISIT MOD MDM: CPT

## 2023-03-12 PROCEDURE — 73560 X-RAY EXAM OF KNEE 1 OR 2: CPT

## 2023-03-12 RX ORDER — HYDROCODONE BITARTRATE AND ACETAMINOPHEN 7.5; 325 MG/1; MG/1
1 TABLET ORAL ONCE
Status: COMPLETED | OUTPATIENT
Start: 2023-03-12 | End: 2023-03-12

## 2023-03-12 RX ORDER — HYDROCODONE BITARTRATE AND ACETAMINOPHEN 5; 325 MG/1; MG/1
1 TABLET ORAL EVERY 6 HOURS PRN
Qty: 10 TABLET | Refills: 0 | Status: SHIPPED | OUTPATIENT
Start: 2023-03-12 | End: 2023-03-15

## 2023-03-12 RX ORDER — PHENOL 1.4 %
1 AEROSOL, SPRAY (ML) MUCOUS MEMBRANE DAILY
COMMUNITY

## 2023-03-12 RX ADMIN — HYDROCODONE BITARTRATE AND ACETAMINOPHEN 1 TABLET: 7.5; 325 TABLET ORAL at 10:46

## 2023-03-12 ASSESSMENT — PAIN DESCRIPTION - LOCATION: LOCATION: KNEE

## 2023-03-12 ASSESSMENT — PAIN SCALES - GENERAL
PAINLEVEL_OUTOF10: 7
PAINLEVEL_OUTOF10: 7

## 2023-03-12 ASSESSMENT — PAIN - FUNCTIONAL ASSESSMENT: PAIN_FUNCTIONAL_ASSESSMENT: 0-10

## 2023-03-12 ASSESSMENT — PAIN DESCRIPTION - ORIENTATION: ORIENTATION: LEFT

## 2023-03-12 NOTE — ED PROVIDER NOTES
[facility] EMERGENCY DEPT  eMERGENCY dEPARTMENT eNCOUnter      Pt Name: Sofia Siegel  MRN: [id number]  [place] 1955  Date of evaluation: 3/12/2023  Provider: Blake Stanley, [address]       Chief Complaint   Patient presents with    Knee Pain     Pt reports knee pain/swelling x2 days. Pt has hx of DVT and PE. Pt reports pain moving to back of knee now         HISTORY OF PRESENT ILLNESS   (Location/Symptom, Timing/Onset,Context/Setting, Quality, Duration, Modifying Factors, Severity)  Note limiting factors. Sofia Siegel is a 74 y.o. female who presents to the emergency department with left knee pain x 2 days. Pt has bad knees and has also had a DVT in the other leg. Tells me she does not miss her eliquis. No injury. HUrts in the posterior knee. Sees DR Monet Torres     The history is provided by the patient. Knee Problem  Location:  Knee  Time since incident:  2 days  Injury: no    Knee location:  L knee  Pain details:     Quality:  Aching    Radiates to:  Does not radiate    Severity:  Moderate    Onset quality:  Sudden    Duration:  2 days    Timing:  Constant  Chronicity:  New  Prior injury to area:  No  Risk factors: obesity      NursingNotes were reviewed. REVIEW OF SYSTEMS    (2-9 systems for level 4, 10 or more for level 5)     Review of Systems   Musculoskeletal:  Positive for arthralgias, gait problem and myalgias. Except as noted above the remainder of the review of systems was reviewed and negative.        PAST MEDICAL HISTORY     Past Medical History:   Diagnosis Date    Chronic midline low back pain without sciatica 10/2/2017    Essential (primary) hypertension 10/2/2017    Familial hypercholesterolemia 10/2/2017    Generalized anxiety disorder 10/2/2017    Glaucoma     Obstructive sleep apnea syndrome 10/2/2017    Osteoarthritis, knee          SURGICALHISTORY       Past Surgical History:   Procedure Laterality Date    CHOLECYSTECTOMY      HYSTERECTOMY, TOTAL ABDOMINAL (CERVIX REMOVED)      KNEE ARTHROSCOPY      TUBAL LIGATION           CURRENT MEDICATIONS       Discharge Medication List as of 3/12/2023 12:16 PM        CONTINUE these medications which have NOT CHANGED    Details   calcium carbonate 600 MG TABS tablet Take 1 tablet by mouth dailyHistorical Med      !! apixaban (ELIQUIS) 5 MG TABS tablet 5 mg 2 times dailyHistorical Med      cyclobenzaprine (FLEXERIL) 10 MG tablet TAKE 1 TABLET BY MOUTH EVERY NIGHT AT BEDTIME AS NEEDED FOR MUSCLE SPASMS, Disp-90 tablet, R-2Normal      losartan (COZAAR) 50 MG tablet TAKE 1 TABLET BY MOUTH EVERY DAY, Disp-90 tablet, R-3Normal      sertraline (ZOLOFT) 100 MG tablet Take 1.5 tablets by mouth daily, Disp-135 tablet, R-3Normal      !! ELIQUIS 5 MG TABS tablet TAKE 1 TABLET BY MOUTH TWICE DAILY, Disp-60 tablet, R-5Normal      hydrOXYzine HCl (ATARAX) 25 MG tablet TAKE 1 TO 2 TABLETS BY MOUTH EVERY NIGHT AT BEDTIME AS NEEDED FOR INSOMNIA, Disp-60 tablet, R-5Normal      mupirocin (BACTROBAN) 2 % ointment Apply topically 2 times daily x 5-7 days, Disp-30 g, R-0, Normal      pravastatin (PRAVACHOL) 10 MG tablet TAKE 1 TABLET BY MOUTH EVERY DAY, Disp-90 tablet, R-3Normal      metoprolol tartrate (LOPRESSOR) 25 MG tablet Take 1 tablet by mouth 2 times daily, Disp-180 tablet, R-3Normal      fexofenadine (ALLEGRA) 180 MG tablet Take 180 mg by mouth dailyHistorical Med      albuterol sulfate HFA (VENTOLIN HFA) 108 (90 Base) MCG/ACT inhaler Inhale 2 puffs into the lungs 4 times daily as needed for Wheezing, Disp-1 Inhaler, R-0Normal      Latanoprostene Bunod 0.024 % SOLN Apply to eyeHistorical Med      Turmeric 450 MG CAPS Take by mouth dailyHistorical Med      aspirin 81 MG EC tablet Take 81 mg by mouth dailyHistorical Med      Ascorbic Acid (VITAMIN C) 500 MG CAPS Take by mouthHistorical Med      Cholecalciferol (VITAMIN D3) 2000 units CAPS Take by mouth 2 times dailyHistorical Med       !! - Potential duplicate medications found.  Please discuss with provider. Ceclor [cefaclor], Ciprofloxacin hcl, Ultram [tramadol], and Zocor [simvastatin]    FAMILY HISTORY       Family History   Problem Relation Age of Onset    Heart Disease Mother     Heart Disease Father     Other Maternal Grandmother         aneurysm          SOCIAL HISTORY       Social History     Socioeconomic History    Marital status:    Tobacco Use    Smoking status: Never     Passive exposure: Yes    Smokeless tobacco: Never   Vaping Use    Vaping Use: Never used   Substance and Sexual Activity    Alcohol use: Not Currently     Social Determinants of Health     Physical Activity: Insufficiently Active    Days of Exercise per Week: 1 day    Minutes of Exercise per Session: 10 min       SCREENINGS    Lucia Coma Scale  Eye Opening: Spontaneous  Best Verbal Response: Oriented  Best Motor Response: Obeys commands  [place]abb Coma Scale Score: 15        PHYSICAL EXAM    (up to 7 for level 4, 8 or more for level 5)     ED Triage Vitals   BP Temp Temp Source Heart Rate Resp SpO2 Height Weight   03/12/23 1015 03/12/23 1015 03/12/23 1015 03/12/23 0949 03/12/23 0949 03/12/23 0949 03/12/23 0949 03/12/23 0949   135/82 98.5 °F (36.9 °C) Oral (!) 112 18 90 % 5' 2\" (1.575 m) 260 lb (117.9 kg)       Physical Exam  Vitals and nursing note reviewed. Constitutional:       Appearance: Normal appearance. She is well-developed. HENT:      Head: Normocephalic and atraumatic. Eyes:      General: No scleral icterus. Right eye: No discharge. Left eye: No discharge. Cardiovascular:      Rate and Rhythm: Normal rate. Pulmonary:      Effort: No respiratory distress. Musculoskeletal:      Cervical back: Normal range of motion and neck supple. Left knee: Bony tenderness present. No deformity, effusion, erythema or ecchymosis. Decreased range of motion. Tenderness present. Normal pulse.         Legs:    Neurological:      Mental Status: She is alert and oriented to person, place, and time.   Psychiatric:         Behavior: Behavior normal.       RESULTS     EKG: All EKG's are interpreted by the Emergency Department Physician who either signs or Co-signsthis chart in the absence of a cardiologist.        RADIOLOGY:   Josuémike Fitzgerald such as CT, Ultrasound and MRI are read by the radiologist. Michelle Gomez radiographic images are visualized and preliminarily interpreted by the emergency physician with the below findings:      Interpretation per the Radiologist below, if available at the time of this note:    VL Extremity Venous Left   Final Result      XR KNEE LEFT (1-2 VIEWS)   Final Result   Osteoarthritis more pronounced medially. No acute abnormality. Recommendation:    Follow up as clinically indicated. Dictated and Electronically Signed by Emili Clark MD, [address] [facility] at [id number] 12:10:04 PM EST               neg      ED BEDSIDEULTRASOUND:   Performed by ED Physician -none    LABS:  Labs Reviewed - No data to display    All other labs were within normal range or not returned as of this dictation. EMERGENCY DEPARTMENT COURSE and DIFFERENTIALDIAGNOSIS/MDM:   Vitals:    Vitals:    03/12/23 1015 03/12/23 1031 03/12/23 1126 03/12/23 1220   BP: 135/82 136/67  (!) 142/82   Pulse:  (!) 113 (!) 110 (!) 108   Resp:  17 18 16   Temp: 98.5 °F (36.9 °C)      TempSrc: Oral      SpO2:  93% 94% 95%   Weight:       Height:               MDM  Number of Diagnoses or Management Options  Acute pain of left knee: new and requires workup  Diagnosis management comments: No DVT or acute injury. Treated pt's pain. KNows she needs a knee replacement and sees Dr Carolina Wheeler. Will follow with him       Amount and/or Complexity of Data Reviewed  Tests in the radiology section of CPT®: ordered and reviewed               CONSULTS:  None    PROCEDURES:  Unless otherwise noted below, none     Procedures    FINAL IMPRESSION      1.  Acute pain of left knee        DISPOSITION/PLAN   DISPOSITION Decision To Discharge 03/12/2023 12:13:46 PM      PATIENT REFERRED TO:  Rosangela Mijares MD  [address]  [phone]          DISCHARGE MEDICATIONS:  Discharge Medication List as of 3/12/2023 12:16 PM        START taking these medications    Details   HYDROcodone-acetaminophen (NORCO) 5-325 MG per tablet Take 1 tablet by mouth every 6 hours as needed for Pain for up to 3 days.  Max Daily Amount: 4 tablets, Disp-10 tablet, R-0Normal                (Please note that portions of this note were completed with a voice recognitionprogram.  Efforts were made to edit the dictations but occasionally words are mis-transcribed.)    SAQIB Bird (electronically signed)           SAQIB Bird  03/12/23 5927

## 2023-03-12 NOTE — ED NOTES
Pulses present via doppler. +1 in right foot +2 in left      Kyle Fajardo RN  03/12/23 [address] [facility], [address] [place] [address]  03/12/23 1016

## 2023-03-27 ENCOUNTER — HOSPITAL ENCOUNTER (OUTPATIENT)
Dept: VASCULAR LAB | Age: 68
Discharge: HOME | End: 2023-03-27
Payer: MEDICARE

## 2023-03-27 DIAGNOSIS — M79.605 LEG PAIN, LEFT: ICD-10-CM

## 2023-03-27 DIAGNOSIS — M79.604 LEG PAIN, RIGHT: ICD-10-CM

## 2023-03-27 DIAGNOSIS — M79.89 LEG SWELLING: ICD-10-CM

## 2023-03-27 PROCEDURE — 93970 EXTREMITY STUDY: CPT

## 2023-03-28 ENCOUNTER — OFFICE VISIT (OUTPATIENT)
Dept: CARDIOLOGY CLINIC | Age: 68
End: 2023-03-28
Payer: MEDICARE

## 2023-03-28 ENCOUNTER — OFFICE VISIT (OUTPATIENT)
Dept: VASCULAR SURGERY | Age: 68
End: 2023-03-28
Payer: MEDICARE

## 2023-03-28 VITALS
HEIGHT: 62 IN | SYSTOLIC BLOOD PRESSURE: 120 MMHG | BODY MASS INDEX: 49.32 KG/M2 | HEART RATE: 81 BPM | OXYGEN SATURATION: 96 % | DIASTOLIC BLOOD PRESSURE: 78 MMHG | WEIGHT: 268 LBS

## 2023-03-28 VITALS
SYSTOLIC BLOOD PRESSURE: 122 MMHG | OXYGEN SATURATION: 97 % | BODY MASS INDEX: 47.84 KG/M2 | HEART RATE: 77 BPM | TEMPERATURE: 97.5 F | DIASTOLIC BLOOD PRESSURE: 80 MMHG | WEIGHT: 260 LBS | HEIGHT: 62 IN

## 2023-03-28 DIAGNOSIS — I10 ESSENTIAL HYPERTENSION: Primary | ICD-10-CM

## 2023-03-28 DIAGNOSIS — I87.2 VENOUS INSUFFICIENCY: Primary | ICD-10-CM

## 2023-03-28 DIAGNOSIS — E78.01 FAMILIAL HYPERCHOLESTEROLEMIA: ICD-10-CM

## 2023-03-28 DIAGNOSIS — E78.5 DYSLIPIDEMIA: ICD-10-CM

## 2023-03-28 PROCEDURE — G8399 PT W/DXA RESULTS DOCUMENT: HCPCS | Performed by: NURSE PRACTITIONER

## 2023-03-28 PROCEDURE — 3017F COLORECTAL CA SCREEN DOC REV: CPT | Performed by: NURSE PRACTITIONER

## 2023-03-28 PROCEDURE — G8484 FLU IMMUNIZE NO ADMIN: HCPCS | Performed by: NURSE PRACTITIONER

## 2023-03-28 PROCEDURE — G8427 DOCREV CUR MEDS BY ELIG CLIN: HCPCS | Performed by: NURSE PRACTITIONER

## 2023-03-28 PROCEDURE — 1124F ACP DISCUSS-NO DSCNMKR DOCD: CPT | Performed by: NURSE PRACTITIONER

## 2023-03-28 PROCEDURE — 3074F SYST BP LT 130 MM HG: CPT | Performed by: NURSE PRACTITIONER

## 2023-03-28 PROCEDURE — 1090F PRES/ABSN URINE INCON ASSESS: CPT | Performed by: NURSE PRACTITIONER

## 2023-03-28 PROCEDURE — 1036F TOBACCO NON-USER: CPT | Performed by: NURSE PRACTITIONER

## 2023-03-28 PROCEDURE — 99212 OFFICE O/P EST SF 10 MIN: CPT | Performed by: NURSE PRACTITIONER

## 2023-03-28 PROCEDURE — G8417 CALC BMI ABV UP PARAM F/U: HCPCS | Performed by: NURSE PRACTITIONER

## 2023-03-28 PROCEDURE — 3078F DIAST BP <80 MM HG: CPT | Performed by: NURSE PRACTITIONER

## 2023-03-28 PROCEDURE — 99214 OFFICE O/P EST MOD 30 MIN: CPT | Performed by: NURSE PRACTITIONER

## 2023-03-28 RX ORDER — PRAVASTATIN SODIUM 10 MG
20 TABLET ORAL DAILY
Qty: 180 TABLET | Refills: 3 | Status: SHIPPED | OUTPATIENT
Start: 2023-03-28

## 2023-03-28 ASSESSMENT — ENCOUNTER SYMPTOMS
SHORTNESS OF BREATH: 0
WHEEZING: 0
COUGH: 0
CHEST TIGHTNESS: 0
SORE THROAT: 0

## 2023-03-28 NOTE — PROGRESS NOTES
(PRAVACHOL) 10 MG tablet Take 2 tablets by mouth daily 180 tablet 3    metoprolol tartrate (LOPRESSOR) 25 MG tablet Take 1 tablet by mouth 2 times daily 180 tablet 3    apixaban (ELIQUIS) 5 MG TABS tablet 5 mg 2 times daily      calcium carbonate 600 MG TABS tablet Take 1 tablet by mouth daily      cyclobenzaprine (FLEXERIL) 10 MG tablet TAKE 1 TABLET BY MOUTH EVERY NIGHT AT BEDTIME AS NEEDED FOR MUSCLE SPASMS 90 tablet 2    losartan (COZAAR) 50 MG tablet TAKE 1 TABLET BY MOUTH EVERY DAY 90 tablet 3    sertraline (ZOLOFT) 100 MG tablet Take 1.5 tablets by mouth daily 135 tablet 3    ELIQUIS 5 MG TABS tablet TAKE 1 TABLET BY MOUTH TWICE DAILY 60 tablet 5    hydrOXYzine HCl (ATARAX) 25 MG tablet TAKE 1 TO 2 TABLETS BY MOUTH EVERY NIGHT AT BEDTIME AS NEEDED FOR INSOMNIA 60 tablet 5    mupirocin (BACTROBAN) 2 % ointment Apply topically 2 times daily x 5-7 days 30 g 0    fexofenadine (ALLEGRA) 180 MG tablet Take 180 mg by mouth daily      albuterol sulfate HFA (VENTOLIN HFA) 108 (90 Base) MCG/ACT inhaler Inhale 2 puffs into the lungs 4 times daily as needed for Wheezing 1 Inhaler 0    Latanoprostene Bunod 0.024 % SOLN Apply to eye      aspirin 81 MG EC tablet Take 81 mg by mouth daily      Ascorbic Acid (VITAMIN C) 500 MG CAPS Take by mouth      Cholecalciferol (VITAMIN D3) 2000 units CAPS Take by mouth 2 times daily       No current facility-administered medications for this visit.      Allergies: Ceclor [cefaclor], Ciprofloxacin hcl, Ultram [tramadol], and Zocor [simvastatin]  Past Medical History:   Diagnosis Date    Chronic midline low back pain without sciatica 10/2/2017    Essential (primary) hypertension 10/2/2017    Familial hypercholesterolemia 10/2/2017    Generalized anxiety disorder 10/2/2017    Glaucoma     Obstructive sleep apnea syndrome 10/2/2017    Osteoarthritis, knee      Past Surgical History:   Procedure Laterality Date    CHOLECYSTECTOMY      HYSTERECTOMY, TOTAL ABDOMINAL (CERVIX REMOVED)      KNEE

## 2023-03-29 NOTE — PROGRESS NOTES
Emilia Oh (:  1955) is a 74 y.o. female,Established patient, here for evaluation of the following chief complaint(s):  Follow-up (Follow up reflux study. )            SUBJECTIVE/OBJECTIVE:  She has a known history of of varicose veins and suspected venous insuffciency. She reports prominent veins on the  Bilateral leg(s), lower extremity edema on the  Bilateral leg(s), and the veins are painful -- severity:  moderate. She reports previous treatment includes prescription compression stockings for > 6 months. She does not have a history of spontaneous rupture. She has a hx of right gaiter zone ulcer which is now healed. She comes in today to discuss results of her venous reflux study. Differential diagnosis for leg pain includes but is not limited to: varicose veins, peripheral vascular disease, arthritic pain, DVT, lumbar disc disease, and neurogenic pain.        Emilia Oh is a 74 y.o. female with the following history as recorded in [facility]:  Patient Active Problem List    Diagnosis Date Noted    PVD (peripheral vascular disease) ([address] 08/15/2022    Class 3 severe obesity due to excess calories with serious comorbidity and body mass index (BMI) of 40.0 to 44.9 in adult ([address] 08/15/2022    Body mass index (BMI) 45.0-49.9, adult ([address] 2023    Splenic artery aneurysm ([address] 2020    Osteoarthritis, knee     Essential (primary) hypertension 10/02/2017    Bilateral carpal tunnel syndrome 10/02/2017    Generalized anxiety disorder 10/02/2017    Familial hypercholesterolemia 10/02/2017    Chronic midline low back pain without sciatica 10/02/2017    Obstructive sleep apnea syndrome 10/02/2017    Recurrent deep vein thrombosis (DVT) ([address] 10/02/2017    Recurrent pulmonary embolism ([address] 10/02/2017     Current Outpatient Medications   Medication Sig Dispense Refill    apixaban (ELIQUIS) 5 MG TABS tablet 5 mg 2 times daily      calcium carbonate 600 MG TABS tablet Take 1 tablet by

## 2023-04-24 ENCOUNTER — TELEPHONE (OUTPATIENT)
Dept: CARDIOLOGY CLINIC | Age: 68
End: 2023-04-24

## 2023-04-24 DIAGNOSIS — I10 ESSENTIAL (PRIMARY) HYPERTENSION: Primary | ICD-10-CM

## 2023-04-24 DIAGNOSIS — E78.01 FAMILIAL HYPERCHOLESTEROLEMIA: ICD-10-CM

## 2023-04-24 DIAGNOSIS — I10 ESSENTIAL (PRIMARY) HYPERTENSION: ICD-10-CM

## 2023-04-24 DIAGNOSIS — E78.5 DYSLIPIDEMIA: ICD-10-CM

## 2023-04-24 LAB
ALBUMIN SERPL-MCNC: 4.2 G/DL (ref 3.5–5.2)
ALP SERPL-CCNC: 49 U/L (ref 35–104)
ALT SERPL-CCNC: 22 U/L (ref 5–33)
ANION GAP SERPL CALCULATED.3IONS-SCNC: 9 MMOL/L (ref 7–19)
AST SERPL-CCNC: 25 U/L (ref 5–32)
BILIRUB SERPL-MCNC: 0.3 MG/DL (ref 0.2–1.2)
BUN SERPL-MCNC: 11 MG/DL (ref 8–23)
CALCIUM SERPL-MCNC: 9.7 MG/DL (ref 8.8–10.2)
CHLORIDE SERPL-SCNC: 104 MMOL/L (ref 98–111)
CHOLEST SERPL-MCNC: 222 MG/DL (ref 160–199)
CO2 SERPL-SCNC: 26 MMOL/L (ref 22–29)
CREAT SERPL-MCNC: 0.7 MG/DL (ref 0.5–0.9)
GLUCOSE SERPL-MCNC: 93 MG/DL (ref 74–109)
HDLC SERPL-MCNC: 59 MG/DL (ref 65–121)
LDLC SERPL CALC-MCNC: 122 MG/DL
POTASSIUM SERPL-SCNC: 4.7 MMOL/L (ref 3.5–5)
PROT SERPL-MCNC: 6.8 G/DL (ref 6.6–8.7)
SODIUM SERPL-SCNC: 139 MMOL/L (ref 136–145)
TRIGL SERPL-MCNC: 204 MG/DL (ref 0–149)

## 2023-04-24 NOTE — TELEPHONE ENCOUNTER
Bassem Yost, SAQIB - CHANEL ZUÑIGA  Please let patient know that her fasting cholesterol was 222. HDL 59. . Would recommend patient increase her Pravachol to 20 mg daily. We will recheck in 3 months.

## 2023-04-25 DIAGNOSIS — I10 ESSENTIAL (PRIMARY) HYPERTENSION: ICD-10-CM

## 2023-04-25 DIAGNOSIS — E78.01 FAMILIAL HYPERCHOLESTEROLEMIA: ICD-10-CM

## 2023-04-25 DIAGNOSIS — E78.01 FAMILIAL HYPERCHOLESTEROLEMIA: Primary | ICD-10-CM

## 2023-04-25 RX ORDER — PRAVASTATIN SODIUM 10 MG
20 TABLET ORAL DAILY
Qty: 180 TABLET | Refills: 3 | Status: SHIPPED | OUTPATIENT
Start: 2023-04-25

## 2023-04-27 ENCOUNTER — OFFICE VISIT (OUTPATIENT)
Dept: PRIMARY CARE CLINIC | Age: 68
End: 2023-04-27

## 2023-04-27 VITALS
HEART RATE: 86 BPM | TEMPERATURE: 96.8 F | OXYGEN SATURATION: 97 % | WEIGHT: 266 LBS | SYSTOLIC BLOOD PRESSURE: 118 MMHG | DIASTOLIC BLOOD PRESSURE: 80 MMHG | BODY MASS INDEX: 48.65 KG/M2

## 2023-04-27 DIAGNOSIS — M17.0 PRIMARY OSTEOARTHRITIS OF BOTH KNEES: ICD-10-CM

## 2023-04-27 DIAGNOSIS — I72.8 SPLENIC ARTERY ANEURYSM (HCC): ICD-10-CM

## 2023-04-27 DIAGNOSIS — I82.409 RECURRENT DEEP VEIN THROMBOSIS (DVT) (HCC): Primary | ICD-10-CM

## 2023-04-27 DIAGNOSIS — I10 ESSENTIAL (PRIMARY) HYPERTENSION: ICD-10-CM

## 2023-04-27 DIAGNOSIS — I26.99 RECURRENT PULMONARY EMBOLISM (HCC): ICD-10-CM

## 2023-04-27 DIAGNOSIS — F41.1 GENERALIZED ANXIETY DISORDER: ICD-10-CM

## 2023-04-27 DIAGNOSIS — E78.01 FAMILIAL HYPERCHOLESTEROLEMIA: ICD-10-CM

## 2023-04-27 SDOH — ECONOMIC STABILITY: HOUSING INSECURITY
IN THE LAST 12 MONTHS, WAS THERE A TIME WHEN YOU DID NOT HAVE A STEADY PLACE TO SLEEP OR SLEPT IN A SHELTER (INCLUDING NOW)?: NO

## 2023-04-27 SDOH — ECONOMIC STABILITY: TRANSPORTATION INSECURITY
IN THE PAST 12 MONTHS, HAS LACK OF TRANSPORTATION KEPT YOU FROM MEETINGS, WORK, OR FROM GETTING THINGS NEEDED FOR DAILY LIVING?: NO

## 2023-04-27 SDOH — ECONOMIC STABILITY: FOOD INSECURITY: WITHIN THE PAST 12 MONTHS, THE FOOD YOU BOUGHT JUST DIDN'T LAST AND YOU DIDN'T HAVE MONEY TO GET MORE.: NEVER TRUE

## 2023-04-27 SDOH — ECONOMIC STABILITY: INCOME INSECURITY: HOW HARD IS IT FOR YOU TO PAY FOR THE VERY BASICS LIKE FOOD, HOUSING, MEDICAL CARE, AND HEATING?: NOT HARD AT ALL

## 2023-04-27 SDOH — ECONOMIC STABILITY: INCOME INSECURITY: HOW HARD IS IT FOR YOU TO PAY FOR THE VERY BASICS LIKE FOOD, HOUSING, MEDICAL CARE, AND HEATING?: NOT VERY HARD

## 2023-04-27 SDOH — ECONOMIC STABILITY: FOOD INSECURITY: WITHIN THE PAST 12 MONTHS, YOU WORRIED THAT YOUR FOOD WOULD RUN OUT BEFORE YOU GOT MONEY TO BUY MORE.: NEVER TRUE

## 2023-04-27 ASSESSMENT — PATIENT HEALTH QUESTIONNAIRE - PHQ9
2. FEELING DOWN, DEPRESSED OR HOPELESS: 2
SUM OF ALL RESPONSES TO PHQ QUESTIONS 1-9: 10
6. FEELING BAD ABOUT YOURSELF - OR THAT YOU ARE A FAILURE OR HAVE LET YOURSELF OR YOUR FAMILY DOWN: 0
7. TROUBLE CONCENTRATING ON THINGS, SUCH AS READING THE NEWSPAPER OR WATCHING TELEVISION: 0
SUM OF ALL RESPONSES TO PHQ QUESTIONS 1-9: 8
4. FEELING TIRED OR HAVING LITTLE ENERGY: 1
10. IF YOU CHECKED OFF ANY PROBLEMS, HOW DIFFICULT HAVE THESE PROBLEMS MADE IT FOR YOU TO DO YOUR WORK, TAKE CARE OF THINGS AT HOME, OR GET ALONG WITH OTHER PEOPLE: 0
SUM OF ALL RESPONSES TO PHQ QUESTIONS 1-9: 8
SUM OF ALL RESPONSES TO PHQ QUESTIONS 1-9: 10
3. TROUBLE FALLING OR STAYING ASLEEP: 1
SUM OF ALL RESPONSES TO PHQ9 QUESTIONS 1 & 2: 4
1. LITTLE INTEREST OR PLEASURE IN DOING THINGS: 2
SUM OF ALL RESPONSES TO PHQ QUESTIONS 1-9: 8
5. POOR APPETITE OR OVEREATING: 1
SUM OF ALL RESPONSES TO PHQ QUESTIONS 1-9: 10
1. LITTLE INTEREST OR PLEASURE IN DOING THINGS: 2
SUM OF ALL RESPONSES TO PHQ QUESTIONS 1-9: 10
SUM OF ALL RESPONSES TO PHQ9 QUESTIONS 1 & 2: 4
8. MOVING OR SPEAKING SO SLOWLY THAT OTHER PEOPLE COULD HAVE NOTICED. OR THE OPPOSITE, BEING SO FIGETY OR RESTLESS THAT YOU HAVE BEEN MOVING AROUND A LOT MORE THAN USUAL: 1
7. TROUBLE CONCENTRATING ON THINGS, SUCH AS READING THE NEWSPAPER OR WATCHING TELEVISION: 0
9. THOUGHTS THAT YOU WOULD BE BETTER OFF DEAD, OR OF HURTING YOURSELF: 0
9. THOUGHTS THAT YOU WOULD BE BETTER OFF DEAD, OR OF HURTING YOURSELF: 0
8. MOVING OR SPEAKING SO SLOWLY THAT OTHER PEOPLE COULD HAVE NOTICED. OR THE OPPOSITE, BEING SO FIGETY OR RESTLESS THAT YOU HAVE BEEN MOVING AROUND A LOT MORE THAN USUAL: 1
10. IF YOU CHECKED OFF ANY PROBLEMS, HOW DIFFICULT HAVE THESE PROBLEMS MADE IT FOR YOU TO DO YOUR WORK, TAKE CARE OF THINGS AT HOME, OR GET ALONG WITH OTHER PEOPLE: 0
2. FEELING DOWN, DEPRESSED OR HOPELESS: 2
3. TROUBLE FALLING OR STAYING ASLEEP: 1
4. FEELING TIRED OR HAVING LITTLE ENERGY: 3
5. POOR APPETITE OR OVEREATING: 1
SUM OF ALL RESPONSES TO PHQ QUESTIONS 1-9: 8

## 2023-04-27 NOTE — PROGRESS NOTES
calcium carbonate 600 MG TABS tablet Take 1 tablet by mouth daily With VIT D3      cyclobenzaprine (FLEXERIL) 10 MG tablet TAKE 1 TABLET BY MOUTH EVERY NIGHT AT BEDTIME AS NEEDED FOR MUSCLE SPASMS 90 tablet 2    losartan (COZAAR) 50 MG tablet TAKE 1 TABLET BY MOUTH EVERY DAY 90 tablet 3    sertraline (ZOLOFT) 100 MG tablet Take 1.5 tablets by mouth daily 135 tablet 3    hydrOXYzine HCl (ATARAX) 25 MG tablet TAKE 1 TO 2 TABLETS BY MOUTH EVERY NIGHT AT BEDTIME AS NEEDED FOR INSOMNIA 60 tablet 5    fexofenadine (ALLEGRA) 180 MG tablet Take 1 tablet by mouth daily      albuterol sulfate HFA (VENTOLIN HFA) 108 (90 Base) MCG/ACT inhaler Inhale 2 puffs into the lungs 4 times daily as needed for Wheezing 1 Inhaler 0    Latanoprostene Bunod 0.024 % SOLN Apply to eye      aspirin 81 MG EC tablet Take 1 tablet by mouth daily      Cholecalciferol (VITAMIN D3) 2000 units CAPS Take by mouth 2 times daily (Patient not taking: Reported on 4/27/2023)       No current facility-administered medications for this visit.      Allergies   Allergen Reactions    Ceclor [Cefaclor]     Ciprofloxacin Hcl     Ultram [Tramadol]      fatigue    Zocor [Simvastatin]      cough       Health Maintenance   Topic Date Due    HIV screen  Never done    Colorectal Cancer Screen  10/15/2022    Breast cancer screen  12/30/2022    Shingles vaccine (1 of 2) 10/27/2023 (Originally 10/23/2007)    Depression Monitoring  10/27/2023    Pneumococcal 65+ years Vaccine (2 - PPSV23 if available, else PCV20) 10/27/2023    Annual Wellness Visit (AWV)  10/28/2023    Lipids  04/24/2024    DTaP/Tdap/Td vaccine (2 - Td or Tdap) 05/01/2028    DEXA (modify frequency per FRAX score)  Completed    Flu vaccine  Completed    COVID-19 Vaccine  Completed    Hepatitis C screen  Completed    Hepatitis A vaccine  Aged Out    Hib vaccine  Aged Out    Meningococcal (ACWY) vaccine  Aged Out    A1C test (Diabetic or Prediabetic)  Discontinued    Pneumococcal 0-64 years Vaccine

## 2023-05-25 RX ORDER — HYDROXYZINE HYDROCHLORIDE 25 MG/1
TABLET, FILM COATED ORAL
Qty: 60 TABLET | Refills: 5 | Status: SHIPPED | OUTPATIENT
Start: 2023-05-25

## 2023-06-22 DIAGNOSIS — I10 ESSENTIAL (PRIMARY) HYPERTENSION: ICD-10-CM

## 2023-06-22 DIAGNOSIS — E78.01 FAMILIAL HYPERCHOLESTEROLEMIA: ICD-10-CM

## 2023-06-22 DIAGNOSIS — E78.5 DYSLIPIDEMIA: Primary | ICD-10-CM

## 2023-06-22 LAB
CHOLEST SERPL-MCNC: 200 MG/DL (ref 160–199)
HDLC SERPL-MCNC: 61 MG/DL (ref 65–121)
LDLC SERPL CALC-MCNC: 108 MG/DL
TRIGL SERPL-MCNC: 155 MG/DL (ref 0–149)

## 2023-06-22 RX ORDER — PRAVASTATIN SODIUM 40 MG
40 TABLET ORAL DAILY
Qty: 90 TABLET | Refills: 3 | Status: SHIPPED | OUTPATIENT
Start: 2023-06-22

## 2023-08-15 DIAGNOSIS — E78.5 DYSLIPIDEMIA: ICD-10-CM

## 2023-08-15 DIAGNOSIS — E78.01 FAMILIAL HYPERCHOLESTEROLEMIA: ICD-10-CM

## 2023-08-15 LAB
CHOLEST SERPL-MCNC: 214 MG/DL (ref 160–199)
HDLC SERPL-MCNC: 60 MG/DL (ref 65–121)
LDLC SERPL CALC-MCNC: 111 MG/DL
TRIGL SERPL-MCNC: 215 MG/DL (ref 0–149)

## 2023-08-15 RX ORDER — EZETIMIBE 10 MG/1
10 TABLET ORAL DAILY
Qty: 30 TABLET | Refills: 5 | Status: SHIPPED | OUTPATIENT
Start: 2023-08-15

## 2023-09-02 DIAGNOSIS — I10 ESSENTIAL (PRIMARY) HYPERTENSION: ICD-10-CM

## 2023-09-05 RX ORDER — LOSARTAN POTASSIUM 50 MG/1
TABLET ORAL
Qty: 90 TABLET | Refills: 3 | OUTPATIENT
Start: 2023-09-05

## 2023-09-15 DIAGNOSIS — E78.01 FAMILIAL HYPERCHOLESTEROLEMIA: ICD-10-CM

## 2023-09-15 LAB
CHOLEST SERPL-MCNC: 192 MG/DL (ref 160–199)
HDLC SERPL-MCNC: 63 MG/DL (ref 65–121)
LDLC SERPL CALC-MCNC: 95 MG/DL
TRIGL SERPL-MCNC: 170 MG/DL (ref 0–149)

## 2023-09-19 DIAGNOSIS — E78.01 FAMILIAL HYPERCHOLESTEROLEMIA: Primary | ICD-10-CM

## 2023-09-19 DIAGNOSIS — E78.5 DYSLIPIDEMIA: ICD-10-CM

## 2023-09-29 DIAGNOSIS — M17.0 PRIMARY OSTEOARTHRITIS OF BOTH KNEES: ICD-10-CM

## 2023-09-29 DIAGNOSIS — M54.50 CHRONIC MIDLINE LOW BACK PAIN WITHOUT SCIATICA: ICD-10-CM

## 2023-09-29 DIAGNOSIS — G89.29 CHRONIC MIDLINE LOW BACK PAIN WITHOUT SCIATICA: ICD-10-CM

## 2023-09-29 RX ORDER — CYCLOBENZAPRINE HCL 10 MG
TABLET ORAL
Qty: 90 TABLET | Refills: 2 | Status: SHIPPED | OUTPATIENT
Start: 2023-09-29

## 2023-09-29 NOTE — TELEPHONE ENCOUNTER
Beatriz Melvin called requesting a refill of the below medication which has been pended for you:     Requested Prescriptions     Pending Prescriptions Disp Refills    cyclobenzaprine (FLEXERIL) 10 MG tablet [Pharmacy Med Name: CYCLOBENZAPRINE 10MG TABLETS] 90 tablet 2     Sig: TAKE 1 TABLET BY MOUTH EVERY NIGHT AT BEDTIME AS NEEDED FOR MUSCLE SPASMS       Last Appointment Date: 4/27/2023  Next Appointment Date: 10/30/2023    Allergies   Allergen Reactions    Ceclor [Cefaclor]     Ciprofloxacin Hcl     Ultram [Tramadol]      fatigue    Zocor [Simvastatin]      cough

## 2023-11-07 ENCOUNTER — OFFICE VISIT (OUTPATIENT)
Dept: PRIMARY CARE CLINIC | Age: 68
End: 2023-11-07
Payer: MEDICARE

## 2023-11-07 VITALS
WEIGHT: 258 LBS | OXYGEN SATURATION: 97 % | HEART RATE: 78 BPM | HEIGHT: 62 IN | SYSTOLIC BLOOD PRESSURE: 131 MMHG | TEMPERATURE: 97.7 F | BODY MASS INDEX: 47.48 KG/M2 | DIASTOLIC BLOOD PRESSURE: 82 MMHG

## 2023-11-07 DIAGNOSIS — Z23 NEED FOR INFLUENZA VACCINATION: ICD-10-CM

## 2023-11-07 DIAGNOSIS — I82.409 RECURRENT DEEP VEIN THROMBOSIS (DVT) (HCC): ICD-10-CM

## 2023-11-07 DIAGNOSIS — I72.8 SPLENIC ARTERY ANEURYSM (HCC): ICD-10-CM

## 2023-11-07 DIAGNOSIS — Z12.11 SCREEN FOR COLON CANCER: ICD-10-CM

## 2023-11-07 DIAGNOSIS — I73.9 PVD (PERIPHERAL VASCULAR DISEASE) (HCC): ICD-10-CM

## 2023-11-07 DIAGNOSIS — I10 ESSENTIAL (PRIMARY) HYPERTENSION: ICD-10-CM

## 2023-11-07 DIAGNOSIS — R53.83 OTHER FATIGUE: ICD-10-CM

## 2023-11-07 DIAGNOSIS — Z00.00 ANNUAL PHYSICAL EXAM: ICD-10-CM

## 2023-11-07 DIAGNOSIS — F41.1 GENERALIZED ANXIETY DISORDER: ICD-10-CM

## 2023-11-07 DIAGNOSIS — M17.0 PRIMARY OSTEOARTHRITIS OF BOTH KNEES: ICD-10-CM

## 2023-11-07 DIAGNOSIS — Z23 NEED FOR PNEUMOCOCCAL VACCINE: ICD-10-CM

## 2023-11-07 DIAGNOSIS — E78.01 FAMILIAL HYPERCHOLESTEROLEMIA: ICD-10-CM

## 2023-11-07 DIAGNOSIS — Z12.31 ENCOUNTER FOR SCREENING MAMMOGRAM FOR BREAST CANCER: ICD-10-CM

## 2023-11-07 DIAGNOSIS — I26.99 RECURRENT PULMONARY EMBOLISM (HCC): Primary | ICD-10-CM

## 2023-11-07 PROCEDURE — G8484 FLU IMMUNIZE NO ADMIN: HCPCS | Performed by: FAMILY MEDICINE

## 2023-11-07 PROCEDURE — G0008 ADMIN INFLUENZA VIRUS VAC: HCPCS | Performed by: FAMILY MEDICINE

## 2023-11-07 PROCEDURE — 1036F TOBACCO NON-USER: CPT | Performed by: FAMILY MEDICINE

## 2023-11-07 PROCEDURE — 1124F ACP DISCUSS-NO DSCNMKR DOCD: CPT | Performed by: FAMILY MEDICINE

## 2023-11-07 PROCEDURE — G0009 ADMIN PNEUMOCOCCAL VACCINE: HCPCS | Performed by: FAMILY MEDICINE

## 2023-11-07 PROCEDURE — 3075F SYST BP GE 130 - 139MM HG: CPT | Performed by: FAMILY MEDICINE

## 2023-11-07 PROCEDURE — G8417 CALC BMI ABV UP PARAM F/U: HCPCS | Performed by: FAMILY MEDICINE

## 2023-11-07 PROCEDURE — 90732 PPSV23 VACC 2 YRS+ SUBQ/IM: CPT | Performed by: FAMILY MEDICINE

## 2023-11-07 PROCEDURE — 90694 VACC AIIV4 NO PRSRV 0.5ML IM: CPT | Performed by: FAMILY MEDICINE

## 2023-11-07 PROCEDURE — 99214 OFFICE O/P EST MOD 30 MIN: CPT | Performed by: FAMILY MEDICINE

## 2023-11-07 PROCEDURE — G0439 PPPS, SUBSEQ VISIT: HCPCS | Performed by: FAMILY MEDICINE

## 2023-11-07 PROCEDURE — 1090F PRES/ABSN URINE INCON ASSESS: CPT | Performed by: FAMILY MEDICINE

## 2023-11-07 PROCEDURE — G8427 DOCREV CUR MEDS BY ELIG CLIN: HCPCS | Performed by: FAMILY MEDICINE

## 2023-11-07 PROCEDURE — 3017F COLORECTAL CA SCREEN DOC REV: CPT | Performed by: FAMILY MEDICINE

## 2023-11-07 PROCEDURE — 3079F DIAST BP 80-89 MM HG: CPT | Performed by: FAMILY MEDICINE

## 2023-11-07 PROCEDURE — G8399 PT W/DXA RESULTS DOCUMENT: HCPCS | Performed by: FAMILY MEDICINE

## 2023-11-07 ASSESSMENT — PATIENT HEALTH QUESTIONNAIRE - PHQ9
SUM OF ALL RESPONSES TO PHQ QUESTIONS 1-9: 4
SUM OF ALL RESPONSES TO PHQ QUESTIONS 1-9: 4
4. FEELING TIRED OR HAVING LITTLE ENERGY: 1
SUM OF ALL RESPONSES TO PHQ9 QUESTIONS 1 & 2: 2
8. MOVING OR SPEAKING SO SLOWLY THAT OTHER PEOPLE COULD HAVE NOTICED. OR THE OPPOSITE, BEING SO FIGETY OR RESTLESS THAT YOU HAVE BEEN MOVING AROUND A LOT MORE THAN USUAL: 0
5. POOR APPETITE OR OVEREATING: 0
9. THOUGHTS THAT YOU WOULD BE BETTER OFF DEAD, OR OF HURTING YOURSELF: 0
SUM OF ALL RESPONSES TO PHQ QUESTIONS 1-9: 4
2. FEELING DOWN, DEPRESSED OR HOPELESS: 1
6. FEELING BAD ABOUT YOURSELF - OR THAT YOU ARE A FAILURE OR HAVE LET YOURSELF OR YOUR FAMILY DOWN: 0
3. TROUBLE FALLING OR STAYING ASLEEP: 1
SUM OF ALL RESPONSES TO PHQ QUESTIONS 1-9: 4
7. TROUBLE CONCENTRATING ON THINGS, SUCH AS READING THE NEWSPAPER OR WATCHING TELEVISION: 0
10. IF YOU CHECKED OFF ANY PROBLEMS, HOW DIFFICULT HAVE THESE PROBLEMS MADE IT FOR YOU TO DO YOUR WORK, TAKE CARE OF THINGS AT HOME, OR GET ALONG WITH OTHER PEOPLE: 0
1. LITTLE INTEREST OR PLEASURE IN DOING THINGS: 1

## 2023-11-07 ASSESSMENT — ENCOUNTER SYMPTOMS
SHORTNESS OF BREATH: 0
DIARRHEA: 0
CONSTIPATION: 0
NAUSEA: 0
ABDOMINAL PAIN: 0
CHEST TIGHTNESS: 0
ANAL BLEEDING: 0
BACK PAIN: 1
COUGH: 0

## 2023-11-07 ASSESSMENT — LIFESTYLE VARIABLES
HOW MANY STANDARD DRINKS CONTAINING ALCOHOL DO YOU HAVE ON A TYPICAL DAY: PATIENT DOES NOT DRINK
HOW OFTEN DO YOU HAVE A DRINK CONTAINING ALCOHOL: NEVER

## 2023-11-07 NOTE — PROGRESS NOTES
Medicare Annual Wellness Visit - Subsequent    Name: Helen Velasquez Date: 2023   MRN: [id number] Sex: Female   Age: 79 y.o. Ethnicity: Non- / Non    : 1955 Race: White (non-)      Rola Leal is here for   Chief Complaint   Patient presents with    Medicare AWV        Screenings for behavioral, psychosocial and functional/safety risks, and cognitive dysfunction are all negative except as indicated below. These results, as well as other patient data from the [address] [facility], are documented in Flowsheets linked to this Encounter. Allergies   Allergen Reactions    Ceclor [Cefaclor]     Ciprofloxacin Hcl     Ultram [Tramadol]      fatigue    Zocor [Simvastatin]      cough       Prior to Visit Medications    Medication Sig Taking?  Authorizing Provider   cyclobenzaprine (FLEXERIL) 10 MG tablet TAKE 1 TABLET BY MOUTH EVERY NIGHT AT BEDTIME AS NEEDED FOR MUSCLE SPASMS Yes Srikanth Gayle MD   ezetimibe (ZETIA) 10 MG tablet Take 1 tablet by mouth daily Yes SAQIB Donahue NP   pravastatin (PRAVACHOL) 40 MG tablet Take 1 tablet by mouth daily Yes SAQIB Donahue NP   hydrOXYzine HCl (ATARAX) 25 MG tablet TAKE 1 TO 2 TABLETS BY MOUTH EVERY NIGHT AT BEDTIME AS NEEDED FOR INSOMNIA Yes Srikanth Gayle MD   apixaban (ELIQUIS) 5 MG TABS tablet Take 1 tablet by mouth 2 times daily Yes Srikanth Gayle MD   metoprolol tartrate (LOPRESSOR) 25 MG tablet Take 1 tablet by mouth 2 times daily Yes SAQIB Donahue NP   losartan (COZAAR) 50 MG tablet TAKE 1 TABLET BY MOUTH EVERY DAY Yes Srikanth Gyale MD   sertraline (ZOLOFT) 100 MG tablet Take 1.5 tablets by mouth daily Yes Srikanth Gayle MD   fexofenadine (ALLEGRA) 180 MG tablet Take 1 tablet by mouth daily Yes Provider, MD Tierra   albuterol sulfate HFA (VENTOLIN HFA) 108 (90 Base) MCG/ACT inhaler Inhale 2 puffs into the lungs 4 times daily as needed for Wheezing Yes Olivia Tracy

## 2023-11-17 RX ORDER — APIXABAN 5 MG/1
5 TABLET, FILM COATED ORAL 2 TIMES DAILY
Qty: 60 TABLET | Refills: 5 | Status: SHIPPED | OUTPATIENT
Start: 2023-11-17

## 2023-11-17 NOTE — TELEPHONE ENCOUNTER
Stella Hoskins called requesting a refill of the below medication which has been pended for you:     Requested Prescriptions     Pending Prescriptions Disp Refills    ELIQUIS 5 MG TABS tablet [Pharmacy Med Name: ELIQUIS 5MG TABLETS] 60 tablet 5     Sig: TAKE 1 TABLET BY MOUTH TWICE DAILY       Last Appointment Date: 11/7/2023  Next Appointment Date: 5/7/2024    Allergies   Allergen Reactions    Ceclor [Cefaclor]     Ciprofloxacin Hcl     Ultram [Tramadol]      fatigue    Zocor [Simvastatin]      cough

## 2023-12-10 DIAGNOSIS — I10 ESSENTIAL (PRIMARY) HYPERTENSION: ICD-10-CM

## 2023-12-11 RX ORDER — LOSARTAN POTASSIUM 50 MG/1
TABLET ORAL
Qty: 90 TABLET | Refills: 3 | Status: SHIPPED | OUTPATIENT
Start: 2023-12-11

## 2024-02-01 DIAGNOSIS — I87.2 VENOUS INSUFFICIENCY: Primary | ICD-10-CM

## 2024-02-01 DIAGNOSIS — M79.89 LEG SWELLING: ICD-10-CM

## 2024-02-01 DIAGNOSIS — M79.605 LEG PAIN, LEFT: ICD-10-CM

## 2024-02-01 DIAGNOSIS — M79.604 LEG PAIN, RIGHT: ICD-10-CM

## 2024-02-08 RX ORDER — EZETIMIBE 10 MG/1
10 TABLET ORAL DAILY
Qty: 30 TABLET | Refills: 5 | Status: SHIPPED | OUTPATIENT
Start: 2024-02-08

## 2024-02-14 LAB — NONINV COLON CA DNA+OCC BLD SCRN STL QL: NEGATIVE

## 2024-02-22 NOTE — PROGRESS NOTES
Lizz Stewart (:  1955) is a 68 y.o. female,Established patient, here for evaluation of the following chief complaint(s):  Follow-up (Follow up CT Abdomen/Pelvis and Venous insufficiency. )            SUBJECTIVE/OBJECTIVE:  She has a known history of splenic artery aneurysm for 1 - 5 years.    She has not had abdominal pain.  She has not had back pain in the cervical spine, thoracic spine, lumbar spine, and sacral spine region. This pain is unchanged since the last visit. Pain is rated as 0.         I have personally reviewed the following: problem list, current meds, allergies, PMH, PSH, family hx, and social hx  Lizz Stewart is a 68 y.o. female with the following history as recorded in [facility]:  Patient Active Problem List    Diagnosis Date Noted    PVD (peripheral vascular disease) ([facility]) 08/15/2022    Class 3 severe obesity due to excess calories with serious comorbidity and body mass index (BMI) of 40.0 to 44.9 in adult ([facility]) 08/15/2022    Body mass index (BMI) 45.0-49.9, adult ([facility]) 2023    Splenic artery aneurysm ([facility]) 2020    Osteoarthritis, knee     Essential (primary) hypertension 10/02/2017    Bilateral carpal tunnel syndrome 10/02/2017    Generalized anxiety disorder 10/02/2017    Familial hypercholesterolemia 10/02/2017    Chronic midline low back pain without sciatica 10/02/2017    Obstructive sleep apnea syndrome 10/02/2017    Recurrent deep vein thrombosis (DVT) ([facility]) 10/02/2017    Recurrent pulmonary embolism ([facility]) 10/02/2017     Current Outpatient Medications   Medication Sig Dispense Refill    ezetimibe (ZETIA) 10 MG tablet TAKE 1 TABLET BY MOUTH DAILY 30 tablet 5    losartan (COZAAR) 50 MG tablet TAKE 1 TABLET BY MOUTH EVERY DAY 90 tablet 3    ELIQUIS 5 MG TABS tablet TAKE 1 TABLET BY MOUTH TWICE DAILY 60 tablet 5    cyclobenzaprine (FLEXERIL) 10 MG tablet TAKE 1 TABLET BY MOUTH EVERY NIGHT AT BEDTIME AS NEEDED FOR MUSCLE SPASMS 90 tablet 2    pravastatin

## 2024-02-29 ENCOUNTER — HOSPITAL ENCOUNTER (OUTPATIENT)
Dept: CT IMAGING | Age: 69
Discharge: HOME | End: 2024-02-29
Payer: MEDICARE

## 2024-02-29 ENCOUNTER — HOSPITAL ENCOUNTER (OUTPATIENT)
Dept: VASCULAR LAB | Age: 69
Discharge: HOME | End: 2024-02-29
Payer: MEDICARE

## 2024-02-29 ENCOUNTER — OFFICE VISIT (OUTPATIENT)
Dept: VASCULAR SURGERY | Age: 69
End: 2024-02-29
Payer: MEDICARE

## 2024-02-29 VITALS
HEART RATE: 93 BPM | SYSTOLIC BLOOD PRESSURE: 154 MMHG | OXYGEN SATURATION: 98 % | DIASTOLIC BLOOD PRESSURE: 92 MMHG | TEMPERATURE: 96.5 F

## 2024-02-29 DIAGNOSIS — M79.89 LEG SWELLING: ICD-10-CM

## 2024-02-29 DIAGNOSIS — M79.604 LEG PAIN, RIGHT: ICD-10-CM

## 2024-02-29 DIAGNOSIS — I72.8 SPLENIC ARTERY ANEURYSM (HCC): Primary | ICD-10-CM

## 2024-02-29 DIAGNOSIS — I72.8 SPLENIC ARTERY ANEURYSM (HCC): ICD-10-CM

## 2024-02-29 DIAGNOSIS — I87.2 VENOUS INSUFFICIENCY: ICD-10-CM

## 2024-02-29 DIAGNOSIS — M79.605 LEG PAIN, LEFT: ICD-10-CM

## 2024-02-29 PROCEDURE — G8484 FLU IMMUNIZE NO ADMIN: HCPCS | Performed by: NURSE PRACTITIONER

## 2024-02-29 PROCEDURE — 93970 EXTREMITY STUDY: CPT

## 2024-02-29 PROCEDURE — G8417 CALC BMI ABV UP PARAM F/U: HCPCS | Performed by: NURSE PRACTITIONER

## 2024-02-29 PROCEDURE — G8399 PT W/DXA RESULTS DOCUMENT: HCPCS | Performed by: NURSE PRACTITIONER

## 2024-02-29 PROCEDURE — 74176 CT ABD & PELVIS W/O CONTRAST: CPT

## 2024-02-29 PROCEDURE — 3077F SYST BP >= 140 MM HG: CPT | Performed by: NURSE PRACTITIONER

## 2024-02-29 PROCEDURE — 1124F ACP DISCUSS-NO DSCNMKR DOCD: CPT | Performed by: NURSE PRACTITIONER

## 2024-02-29 PROCEDURE — 99213 OFFICE O/P EST LOW 20 MIN: CPT | Performed by: NURSE PRACTITIONER

## 2024-02-29 PROCEDURE — 3079F DIAST BP 80-89 MM HG: CPT | Performed by: NURSE PRACTITIONER

## 2024-02-29 PROCEDURE — G8427 DOCREV CUR MEDS BY ELIG CLIN: HCPCS | Performed by: NURSE PRACTITIONER

## 2024-02-29 PROCEDURE — 1090F PRES/ABSN URINE INCON ASSESS: CPT | Performed by: NURSE PRACTITIONER

## 2024-02-29 PROCEDURE — 3017F COLORECTAL CA SCREEN DOC REV: CPT | Performed by: NURSE PRACTITIONER

## 2024-02-29 PROCEDURE — 1036F TOBACCO NON-USER: CPT | Performed by: NURSE PRACTITIONER

## 2024-03-06 NOTE — TELEPHONE ENCOUNTER
Last seen: 11/7/2023. Follow-up:   Future Appointments   Date Time Provider Department Center   3/19/2024 10:30 AM Atiya Pool APRN - NP N [facility] [place]   5/7/2024 11:20 AM Justen Cruz MD [facility]   3/3/2025 10:00 AM [facility] CT RM 2 [facility] CT [facility] Rad/Card   3/3/2025 10:45 AM Aimee Del Rio APRN N [facility]

## 2024-03-07 RX ORDER — SERTRALINE HYDROCHLORIDE 100 MG/1
150 TABLET, FILM COATED ORAL DAILY
Qty: 135 TABLET | Refills: 3 | Status: SHIPPED | OUTPATIENT
Start: 2024-03-07

## 2024-03-13 DIAGNOSIS — I10 ESSENTIAL (PRIMARY) HYPERTENSION: ICD-10-CM

## 2024-03-13 DIAGNOSIS — R53.83 OTHER FATIGUE: ICD-10-CM

## 2024-03-13 DIAGNOSIS — E78.01 FAMILIAL HYPERCHOLESTEROLEMIA: ICD-10-CM

## 2024-03-13 DIAGNOSIS — E78.5 DYSLIPIDEMIA: ICD-10-CM

## 2024-03-13 LAB
ALBUMIN SERPL-MCNC: 4 G/DL (ref 3.5–5.2)
ALP SERPL-CCNC: 53 U/L (ref 35–104)
ALT SERPL-CCNC: 21 U/L (ref 5–33)
ANION GAP SERPL CALCULATED.3IONS-SCNC: 11 MMOL/L (ref 7–19)
AST SERPL-CCNC: 24 U/L (ref 5–32)
BASOPHILS # BLD: 0 K/UL (ref 0–0.2)
BASOPHILS NFR BLD: 0.3 % (ref 0–1)
BILIRUB SERPL-MCNC: 0.4 MG/DL (ref 0.2–1.2)
BUN SERPL-MCNC: 8 MG/DL (ref 8–23)
CALCIUM SERPL-MCNC: 9 MG/DL (ref 8.8–10.2)
CHLORIDE SERPL-SCNC: 106 MMOL/L (ref 98–111)
CHOLEST SERPL-MCNC: 182 MG/DL (ref 160–199)
CO2 SERPL-SCNC: 25 MMOL/L (ref 22–29)
CREAT SERPL-MCNC: 0.7 MG/DL (ref 0.5–0.9)
EOSINOPHIL # BLD: 0.2 K/UL (ref 0–0.6)
EOSINOPHIL NFR BLD: 2.7 % (ref 0–5)
ERYTHROCYTE [DISTWIDTH] IN BLOOD BY AUTOMATED COUNT: 13.9 % (ref 11.5–14.5)
GLUCOSE SERPL-MCNC: 107 MG/DL (ref 74–109)
HCT VFR BLD AUTO: 41.1 % (ref 37–47)
HDLC SERPL-MCNC: 66 MG/DL (ref 65–121)
HGB BLD-MCNC: 13.6 G/DL (ref 12–16)
IMM GRANULOCYTES # BLD: 0 K/UL
LDLC SERPL CALC-MCNC: 86 MG/DL
LYMPHOCYTES # BLD: 2.1 K/UL (ref 1.1–4.5)
LYMPHOCYTES NFR BLD: 34.4 % (ref 20–40)
MCH RBC QN AUTO: 30.2 PG (ref 27–31)
MCHC RBC AUTO-ENTMCNC: 33.1 G/DL (ref 33–37)
MCV RBC AUTO: 91.1 FL (ref 81–99)
MONOCYTES # BLD: 0.5 K/UL (ref 0–0.9)
MONOCYTES NFR BLD: 8.2 % (ref 0–10)
NEUTROPHILS # BLD: 3.4 K/UL (ref 1.5–7.5)
NEUTS SEG NFR BLD: 54.2 % (ref 50–65)
PLATELET # BLD AUTO: 208 K/UL (ref 130–400)
PMV BLD AUTO: 9.9 FL (ref 9.4–12.3)
POTASSIUM SERPL-SCNC: 4.4 MMOL/L (ref 3.5–5)
PROT SERPL-MCNC: 6.6 G/DL (ref 6.6–8.7)
RBC # BLD AUTO: 4.51 M/UL (ref 4.2–5.4)
SODIUM SERPL-SCNC: 142 MMOL/L (ref 136–145)
T4 FREE SERPL-MCNC: 0.85 NG/DL (ref 0.93–1.7)
TRIGL SERPL-MCNC: 151 MG/DL (ref 0–149)
TSH SERPL DL<=0.005 MIU/L-ACNC: 1.02 UIU/ML (ref 0.27–4.2)
WBC # BLD AUTO: 6.2 K/UL (ref 4.8–10.8)

## 2024-03-14 ENCOUNTER — PATIENT MESSAGE (OUTPATIENT)
Dept: CARDIOLOGY CLINIC | Age: 69
End: 2024-03-14

## 2024-03-31 DIAGNOSIS — E78.01 FAMILIAL HYPERCHOLESTEROLEMIA: ICD-10-CM

## 2024-04-02 RX ORDER — PRAVASTATIN SODIUM 40 MG
40 TABLET ORAL DAILY
Qty: 90 TABLET | Refills: 3 | Status: SHIPPED | OUTPATIENT
Start: 2024-04-02

## 2024-04-16 RX ORDER — HYDROXYZINE HYDROCHLORIDE 25 MG/1
TABLET, FILM COATED ORAL
Qty: 60 TABLET | Refills: 5 | Status: SHIPPED | OUTPATIENT
Start: 2024-04-16

## 2024-04-25 RX ORDER — APIXABAN 5 MG/1
5 TABLET, FILM COATED ORAL 2 TIMES DAILY
Qty: 60 TABLET | Refills: 5 | Status: SHIPPED | OUTPATIENT
Start: 2024-04-25

## 2024-05-21 RX ORDER — EZETIMIBE 10 MG/1
10 TABLET ORAL DAILY
Qty: 30 TABLET | Refills: 5 | Status: SHIPPED | OUTPATIENT
Start: 2024-05-21

## 2024-06-29 DIAGNOSIS — M17.0 PRIMARY OSTEOARTHRITIS OF BOTH KNEES: ICD-10-CM

## 2024-06-29 DIAGNOSIS — G89.29 CHRONIC MIDLINE LOW BACK PAIN WITHOUT SCIATICA: ICD-10-CM

## 2024-06-29 DIAGNOSIS — M54.50 CHRONIC MIDLINE LOW BACK PAIN WITHOUT SCIATICA: ICD-10-CM

## 2024-07-01 RX ORDER — CYCLOBENZAPRINE HCL 10 MG
TABLET ORAL
Qty: 90 TABLET | Refills: 2 | Status: SHIPPED | OUTPATIENT
Start: 2024-07-01

## 2024-07-30 DIAGNOSIS — E78.01 FAMILIAL HYPERCHOLESTEROLEMIA: ICD-10-CM

## 2024-07-30 DIAGNOSIS — I10 ESSENTIAL (PRIMARY) HYPERTENSION: Primary | ICD-10-CM

## 2024-07-30 DIAGNOSIS — I10 ESSENTIAL (PRIMARY) HYPERTENSION: ICD-10-CM

## 2024-07-30 LAB
ALBUMIN SERPL-MCNC: 4.2 G/DL (ref 3.5–5.2)
ALP SERPL-CCNC: 57 U/L (ref 35–104)
ALT SERPL-CCNC: 26 U/L (ref 5–33)
ANION GAP SERPL CALCULATED.3IONS-SCNC: 13 MMOL/L (ref 7–19)
AST SERPL-CCNC: 32 U/L (ref 5–32)
BASOPHILS # BLD: 0 K/UL (ref 0–0.2)
BASOPHILS NFR BLD: 0.6 % (ref 0–1)
BILIRUB SERPL-MCNC: 0.3 MG/DL (ref 0.2–1.2)
BUN SERPL-MCNC: 8 MG/DL (ref 8–23)
CALCIUM SERPL-MCNC: 9.1 MG/DL (ref 8.8–10.2)
CHLORIDE SERPL-SCNC: 103 MMOL/L (ref 98–111)
CHOLEST SERPL-MCNC: 222 MG/DL (ref 160–199)
CO2 SERPL-SCNC: 25 MMOL/L (ref 22–29)
CREAT SERPL-MCNC: 0.7 MG/DL (ref 0.5–0.9)
EOSINOPHIL # BLD: 0.2 K/UL (ref 0–0.6)
EOSINOPHIL NFR BLD: 2.6 % (ref 0–5)
ERYTHROCYTE [DISTWIDTH] IN BLOOD BY AUTOMATED COUNT: 13.7 % (ref 11.5–14.5)
GLUCOSE SERPL-MCNC: 113 MG/DL (ref 74–109)
HCT VFR BLD AUTO: 43.8 % (ref 37–47)
HDLC SERPL-MCNC: 63 MG/DL (ref 65–121)
HGB BLD-MCNC: 13.6 G/DL (ref 12–16)
IMM GRANULOCYTES # BLD: 0 K/UL
LDLC SERPL CALC-MCNC: 110 MG/DL
LYMPHOCYTES # BLD: 2.6 K/UL (ref 1.1–4.5)
LYMPHOCYTES NFR BLD: 39.4 % (ref 20–40)
MCH RBC QN AUTO: 28.5 PG (ref 27–31)
MCHC RBC AUTO-ENTMCNC: 31.1 G/DL (ref 33–37)
MCV RBC AUTO: 91.6 FL (ref 81–99)
MONOCYTES # BLD: 0.5 K/UL (ref 0–0.9)
MONOCYTES NFR BLD: 8 % (ref 0–10)
NEUTROPHILS # BLD: 3.2 K/UL (ref 1.5–7.5)
NEUTS SEG NFR BLD: 49.1 % (ref 50–65)
PLATELET # BLD AUTO: 229 K/UL (ref 130–400)
PMV BLD AUTO: 10.1 FL (ref 9.4–12.3)
POTASSIUM SERPL-SCNC: 4.3 MMOL/L (ref 3.5–5)
PROT SERPL-MCNC: 7 G/DL (ref 6.6–8.7)
RBC # BLD AUTO: 4.78 M/UL (ref 4.2–5.4)
SODIUM SERPL-SCNC: 141 MMOL/L (ref 136–145)
TRIGL SERPL-MCNC: 246 MG/DL (ref 0–149)
WBC # BLD AUTO: 6.5 K/UL (ref 4.8–10.8)

## 2024-08-05 SDOH — ECONOMIC STABILITY: INCOME INSECURITY: HOW HARD IS IT FOR YOU TO PAY FOR THE VERY BASICS LIKE FOOD, HOUSING, MEDICAL CARE, AND HEATING?: SOMEWHAT HARD

## 2024-08-05 SDOH — ECONOMIC STABILITY: FOOD INSECURITY: WITHIN THE PAST 12 MONTHS, THE FOOD YOU BOUGHT JUST DIDN'T LAST AND YOU DIDN'T HAVE MONEY TO GET MORE.: NEVER TRUE

## 2024-08-05 SDOH — ECONOMIC STABILITY: FOOD INSECURITY: WITHIN THE PAST 12 MONTHS, YOU WORRIED THAT YOUR FOOD WOULD RUN OUT BEFORE YOU GOT MONEY TO BUY MORE.: NEVER TRUE

## 2024-08-05 ASSESSMENT — PATIENT HEALTH QUESTIONNAIRE - PHQ9
6. FEELING BAD ABOUT YOURSELF - OR THAT YOU ARE A FAILURE OR HAVE LET YOURSELF OR YOUR FAMILY DOWN: NOT AT ALL
SUM OF ALL RESPONSES TO PHQ QUESTIONS 1-9: 9
10. IF YOU CHECKED OFF ANY PROBLEMS, HOW DIFFICULT HAVE THESE PROBLEMS MADE IT FOR YOU TO DO YOUR WORK, TAKE CARE OF THINGS AT HOME, OR GET ALONG WITH OTHER PEOPLE: SOMEWHAT DIFFICULT
9. THOUGHTS THAT YOU WOULD BE BETTER OFF DEAD, OR OF HURTING YOURSELF: NOT AT ALL
8. MOVING OR SPEAKING SO SLOWLY THAT OTHER PEOPLE COULD HAVE NOTICED. OR THE OPPOSITE - BEING SO FIDGETY OR RESTLESS THAT YOU HAVE BEEN MOVING AROUND A LOT MORE THAN USUAL: MORE THAN HALF THE DAYS
7. TROUBLE CONCENTRATING ON THINGS, SUCH AS READING THE NEWSPAPER OR WATCHING TELEVISION: SEVERAL DAYS
3. TROUBLE FALLING OR STAYING ASLEEP: SEVERAL DAYS
2. FEELING DOWN, DEPRESSED OR HOPELESS: SEVERAL DAYS
1. LITTLE INTEREST OR PLEASURE IN DOING THINGS: SEVERAL DAYS
5. POOR APPETITE OR OVEREATING: SEVERAL DAYS
4. FEELING TIRED OR HAVING LITTLE ENERGY: MORE THAN HALF THE DAYS
5. POOR APPETITE OR OVEREATING: SEVERAL DAYS
4. FEELING TIRED OR HAVING LITTLE ENERGY: MORE THAN HALF THE DAYS
9. THOUGHTS THAT YOU WOULD BE BETTER OFF DEAD, OR OF HURTING YOURSELF: NOT AT ALL
1. LITTLE INTEREST OR PLEASURE IN DOING THINGS: SEVERAL DAYS
SUM OF ALL RESPONSES TO PHQ QUESTIONS 1-9: 9
10. IF YOU CHECKED OFF ANY PROBLEMS, HOW DIFFICULT HAVE THESE PROBLEMS MADE IT FOR YOU TO DO YOUR WORK, TAKE CARE OF THINGS AT HOME, OR GET ALONG WITH OTHER PEOPLE: SOMEWHAT DIFFICULT
8. MOVING OR SPEAKING SO SLOWLY THAT OTHER PEOPLE COULD HAVE NOTICED. OR THE OPPOSITE, BEING SO FIGETY OR RESTLESS THAT YOU HAVE BEEN MOVING AROUND A LOT MORE THAN USUAL: MORE THAN HALF THE DAYS
SUM OF ALL RESPONSES TO PHQ QUESTIONS 1-9: 9
SUM OF ALL RESPONSES TO PHQ QUESTIONS 1-9: 9
2. FEELING DOWN, DEPRESSED OR HOPELESS: SEVERAL DAYS
SUM OF ALL RESPONSES TO PHQ QUESTIONS 1-9: 9
7. TROUBLE CONCENTRATING ON THINGS, SUCH AS READING THE NEWSPAPER OR WATCHING TELEVISION: SEVERAL DAYS
3. TROUBLE FALLING OR STAYING ASLEEP: SEVERAL DAYS
6. FEELING BAD ABOUT YOURSELF - OR THAT YOU ARE A FAILURE OR HAVE LET YOURSELF OR YOUR FAMILY DOWN: NOT AT ALL
SUM OF ALL RESPONSES TO PHQ9 QUESTIONS 1 & 2: 2

## 2024-08-06 ENCOUNTER — OFFICE VISIT (OUTPATIENT)
Dept: PRIMARY CARE CLINIC | Age: 69
End: 2024-08-06
Payer: MEDICARE

## 2024-08-06 VITALS
TEMPERATURE: 97 F | OXYGEN SATURATION: 96 % | DIASTOLIC BLOOD PRESSURE: 82 MMHG | WEIGHT: 263 LBS | BODY MASS INDEX: 48.1 KG/M2 | SYSTOLIC BLOOD PRESSURE: 144 MMHG | HEART RATE: 83 BPM

## 2024-08-06 DIAGNOSIS — G89.29 CHRONIC MIDLINE LOW BACK PAIN WITHOUT SCIATICA: ICD-10-CM

## 2024-08-06 DIAGNOSIS — R06.00 DYSPNEA, UNSPECIFIED TYPE: ICD-10-CM

## 2024-08-06 DIAGNOSIS — M81.0 AGE-RELATED OSTEOPOROSIS WITHOUT CURRENT PATHOLOGICAL FRACTURE: ICD-10-CM

## 2024-08-06 DIAGNOSIS — F41.8 DEPRESSION WITH ANXIETY: ICD-10-CM

## 2024-08-06 DIAGNOSIS — M54.50 CHRONIC MIDLINE LOW BACK PAIN WITHOUT SCIATICA: ICD-10-CM

## 2024-08-06 DIAGNOSIS — F51.01 PRIMARY INSOMNIA: ICD-10-CM

## 2024-08-06 DIAGNOSIS — I82.401 RECURRENT ACUTE DEEP VEIN THROMBOSIS (DVT) OF RIGHT LOWER EXTREMITY (HCC): ICD-10-CM

## 2024-08-06 DIAGNOSIS — E78.01 FAMILIAL HYPERCHOLESTEROLEMIA: ICD-10-CM

## 2024-08-06 DIAGNOSIS — I10 ESSENTIAL (PRIMARY) HYPERTENSION: Primary | ICD-10-CM

## 2024-08-06 DIAGNOSIS — Z12.31 ENCOUNTER FOR SCREENING MAMMOGRAM FOR BREAST CANCER: ICD-10-CM

## 2024-08-06 PROCEDURE — G8399 PT W/DXA RESULTS DOCUMENT: HCPCS | Performed by: FAMILY MEDICINE

## 2024-08-06 PROCEDURE — 3077F SYST BP >= 140 MM HG: CPT | Performed by: FAMILY MEDICINE

## 2024-08-06 PROCEDURE — 3079F DIAST BP 80-89 MM HG: CPT | Performed by: FAMILY MEDICINE

## 2024-08-06 PROCEDURE — G8427 DOCREV CUR MEDS BY ELIG CLIN: HCPCS | Performed by: FAMILY MEDICINE

## 2024-08-06 PROCEDURE — 1090F PRES/ABSN URINE INCON ASSESS: CPT | Performed by: FAMILY MEDICINE

## 2024-08-06 PROCEDURE — G2211 COMPLEX E/M VISIT ADD ON: HCPCS | Performed by: FAMILY MEDICINE

## 2024-08-06 PROCEDURE — 1036F TOBACCO NON-USER: CPT | Performed by: FAMILY MEDICINE

## 2024-08-06 PROCEDURE — 3017F COLORECTAL CA SCREEN DOC REV: CPT | Performed by: FAMILY MEDICINE

## 2024-08-06 PROCEDURE — 1124F ACP DISCUSS-NO DSCNMKR DOCD: CPT | Performed by: FAMILY MEDICINE

## 2024-08-06 PROCEDURE — 99214 OFFICE O/P EST MOD 30 MIN: CPT | Performed by: FAMILY MEDICINE

## 2024-08-06 PROCEDURE — G8417 CALC BMI ABV UP PARAM F/U: HCPCS | Performed by: FAMILY MEDICINE

## 2024-08-06 RX ORDER — LOSARTAN POTASSIUM 100 MG/1
100 TABLET ORAL DAILY
Qty: 90 TABLET | Refills: 1 | Status: SHIPPED | OUTPATIENT
Start: 2024-08-06

## 2024-08-06 RX ORDER — ALENDRONATE SODIUM 70 MG/1
70 TABLET ORAL
Qty: 4 TABLET | Refills: 0 | Status: SHIPPED | OUTPATIENT
Start: 2024-08-06 | End: 2024-08-08

## 2024-08-06 ASSESSMENT — ENCOUNTER SYMPTOMS
COUGH: 0
ABDOMINAL PAIN: 0
ANAL BLEEDING: 0
CONSTIPATION: 0
CHEST TIGHTNESS: 0
DIARRHEA: 0
NAUSEA: 0
BACK PAIN: 1
SHORTNESS OF BREATH: 1

## 2024-08-06 NOTE — PATIENT INSTRUCTIONS
What are FODMAPs?    Recent research has shown that certain carbohydrates can contribute to symptoms of irritable bowel sufferers and these carbs are:    Fermentable, Oligo-saccharides, Di-saccharides, Mono-saccharides and Polyols        https://www.ibsdiets.org/      Check the following website to choose validated blood pressure cuffs:  https://www.validatebp.org/    How to Check Blood Pressure

## 2024-08-06 NOTE — PROGRESS NOTES
[facility]  [address]  [address]  [place] [address]  Dept: [phone]  Dept Fax: [phone]  Loc: [phone]    Lizz Stewart is a 68 y.o. female who presents today for her medical conditions/complaints as noted below.  Lizz Stewart is here for 6 Month Follow-Up    Patient History:     Family history of coronary artery disease.  -[facility] cardiology    Splenic artery aneurysm  -[facility] vascular  - March 1, 2024: Unchanged peripherally calcified splenic artery aneurysm measuring 2 x 1.5 cm.    History of DVT and pulmonary embolism.  -1989 right lower extremity DVT and pulmonary embolism  -June 21, 2016: Evidence of partially occlusive chronic appearing deep vein thrombosis in the right lower extremity.    Osteoporosis  -February 17, 2023: Bone density  - -Left femoral neck: T-score -2.5  - -Lumbar spine: T-score 0.8  - August 6, 2024: Started on alendronate      pthx         Subjective:   CC:  Here today to discuss the following:    Coming in today for routine follow-up.    She is complaining of some occasional shortness of breath and bilateral lower extremity edema.  No chest pain or palpitations.  No orthopnea or PND.    Hypertension  Compliant with medications.  No adverse effects from medication.  No lightheadedness, palpitations, or chest pain.      Hyperlipidemia  Tolerating current cholesterol medication without side effects. . Attempting to reduce processed sugar and cholesterol from diet.    Lower Back Pain, Chronic  Compliant with medications.  No adverse effects from medication.  Symptoms continue to be stable.  Lower back pain is described as a dull ache and occasionally sharp and stabbing.  No radiation.  Relieved with her current pain medication.  No numbness or weakness in lower extremities.  Currently satisfied with treatment regimen as it provides some relief.    Insomnia  Currently stable.  Satisfied with current treatment regimen.  No

## 2024-08-08 RX ORDER — ALENDRONATE SODIUM 70 MG/1
70 TABLET ORAL
Qty: 12 TABLET | Refills: 1 | Status: SHIPPED | OUTPATIENT
Start: 2024-08-08

## 2024-09-16 DIAGNOSIS — M54.50 CHRONIC MIDLINE LOW BACK PAIN WITHOUT SCIATICA: ICD-10-CM

## 2024-09-16 DIAGNOSIS — G89.29 CHRONIC MIDLINE LOW BACK PAIN WITHOUT SCIATICA: ICD-10-CM

## 2024-09-16 DIAGNOSIS — M17.0 PRIMARY OSTEOARTHRITIS OF BOTH KNEES: ICD-10-CM

## 2024-09-17 RX ORDER — CYCLOBENZAPRINE HCL 10 MG
TABLET ORAL
Qty: 90 TABLET | Refills: 2 | OUTPATIENT
Start: 2024-09-17

## 2024-10-01 ENCOUNTER — OFFICE VISIT (OUTPATIENT)
Dept: CARDIOLOGY CLINIC | Age: 69
End: 2024-10-01
Payer: MEDICARE

## 2024-10-01 VITALS
OXYGEN SATURATION: 95 % | SYSTOLIC BLOOD PRESSURE: 134 MMHG | WEIGHT: 261 LBS | HEIGHT: 62 IN | HEART RATE: 87 BPM | DIASTOLIC BLOOD PRESSURE: 82 MMHG | BODY MASS INDEX: 48.03 KG/M2

## 2024-10-01 DIAGNOSIS — Z82.49 FAMILY HISTORY OF EARLY CAD: Primary | ICD-10-CM

## 2024-10-01 DIAGNOSIS — I10 ESSENTIAL HYPERTENSION: ICD-10-CM

## 2024-10-01 DIAGNOSIS — E78.5 DYSLIPIDEMIA: ICD-10-CM

## 2024-10-01 PROCEDURE — 93000 ELECTROCARDIOGRAM COMPLETE: CPT | Performed by: NURSE PRACTITIONER

## 2024-10-01 PROCEDURE — 3017F COLORECTAL CA SCREEN DOC REV: CPT | Performed by: NURSE PRACTITIONER

## 2024-10-01 PROCEDURE — 3075F SYST BP GE 130 - 139MM HG: CPT | Performed by: NURSE PRACTITIONER

## 2024-10-01 PROCEDURE — G8417 CALC BMI ABV UP PARAM F/U: HCPCS | Performed by: NURSE PRACTITIONER

## 2024-10-01 PROCEDURE — G8427 DOCREV CUR MEDS BY ELIG CLIN: HCPCS | Performed by: NURSE PRACTITIONER

## 2024-10-01 PROCEDURE — 3079F DIAST BP 80-89 MM HG: CPT | Performed by: NURSE PRACTITIONER

## 2024-10-01 PROCEDURE — G8484 FLU IMMUNIZE NO ADMIN: HCPCS | Performed by: NURSE PRACTITIONER

## 2024-10-01 PROCEDURE — 99214 OFFICE O/P EST MOD 30 MIN: CPT | Performed by: NURSE PRACTITIONER

## 2024-10-01 PROCEDURE — G8399 PT W/DXA RESULTS DOCUMENT: HCPCS | Performed by: NURSE PRACTITIONER

## 2024-10-01 PROCEDURE — 1090F PRES/ABSN URINE INCON ASSESS: CPT | Performed by: NURSE PRACTITIONER

## 2024-10-01 PROCEDURE — 1036F TOBACCO NON-USER: CPT | Performed by: NURSE PRACTITIONER

## 2024-10-01 PROCEDURE — 1124F ACP DISCUSS-NO DSCNMKR DOCD: CPT | Performed by: NURSE PRACTITIONER

## 2024-10-01 ASSESSMENT — ENCOUNTER SYMPTOMS
COUGH: 0
WHEEZING: 0
SORE THROAT: 0
CHEST TIGHTNESS: 0
SHORTNESS OF BREATH: 0

## 2024-10-01 NOTE — PROGRESS NOTES
Never   Substance Use Topics    Alcohol use: Not Currently          Review of Systems:    Review of Systems   Constitutional:  Negative for activity change, chills, diaphoresis, fatigue and fever.   HENT:  Negative for congestion and sore throat.    Respiratory:  Negative for cough, chest tightness, shortness of breath and wheezing.    Cardiovascular:  Negative for chest pain, palpitations and leg swelling.   Neurological:  Negative for dizziness, syncope, light-headedness and headaches.   Psychiatric/Behavioral:  Negative for confusion. The patient is not nervous/anxious.         Objective:    /82   Pulse 87   Ht 1.575 m (5' 2\")   Wt 118.4 kg (261 lb)   SpO2 95%   BMI 47.74 kg/m²     GENERAL - well developed and well nourished, conversant  HEENT -  PERRLA, Hearing appears normal, gentleman lids are normal.  External inspection of ears and nose appear normal.  NECK - no thyromegaly, no JVD, trachea is in the midline  CARDIOVASCULAR - PMI is in the mid line clavicular position, Normal S1 and S2 with no systolic murmur.  No S3 or S4    PULMONARY - no respiratory distress.  No wheezes or rales. Lungs are clear to ausculation, normal respiratory effort.  ABDOMEN  - soft, non tender, no rebound  MUSCULOSKELETAL  - range of motion of the upper and lower extermites appears normal and equal and is without pain   EXTREMITIES - no significant edema   NEUROLOGIC - gait and station are normal  SKIN - turgor is normal, can warm and dry.  PSYCHIATRIC - normal mood and affect, alert and orientated x 3,      ASSESSMENT:    ALL THE CARDIOLOGY PROBLEMS ARE LISTED ABOVE; HOWEVER, THE FOLLOWING SPECIFIC CARDIAC PROBLEMS / CONDITIONS WERE ADDRESSED AND TREATED DURING THE OFFICE VISIT TODAY:                                                                                            MEDICAL DECISION MAKING             Cardiac Specific Problem / Diagnosis  Discussion and Data Reviewed Diagnostic Procedures Ordered Management

## 2024-10-17 RX ORDER — METOPROLOL TARTRATE 25 MG/1
25 TABLET, FILM COATED ORAL 2 TIMES DAILY
Qty: 180 TABLET | Refills: 3 | Status: SHIPPED | OUTPATIENT
Start: 2024-10-17

## 2024-10-21 ENCOUNTER — HOSPITAL ENCOUNTER (OUTPATIENT)
Dept: WOMENS IMAGING | Age: 69
Discharge: HOME | End: 2024-10-21
Payer: MEDICARE

## 2024-10-21 DIAGNOSIS — Z12.31 ENCOUNTER FOR SCREENING MAMMOGRAM FOR BREAST CANCER: ICD-10-CM

## 2024-10-21 PROCEDURE — 77063 BREAST TOMOSYNTHESIS BI: CPT

## 2024-10-28 RX ORDER — APIXABAN 5 MG/1
5 TABLET, FILM COATED ORAL 2 TIMES DAILY
Qty: 60 TABLET | Refills: 5 | Status: SHIPPED | OUTPATIENT
Start: 2024-10-28

## 2024-10-28 RX ORDER — EZETIMIBE 10 MG/1
10 TABLET ORAL DAILY
Qty: 90 TABLET | Refills: 3 | Status: SHIPPED | OUTPATIENT
Start: 2024-10-28

## 2024-11-05 RX ORDER — ALENDRONATE SODIUM 70 MG/1
70 TABLET ORAL
Qty: 12 TABLET | Refills: 1 | OUTPATIENT
Start: 2024-11-05

## 2024-11-10 DIAGNOSIS — I10 ESSENTIAL (PRIMARY) HYPERTENSION: ICD-10-CM

## 2024-11-11 RX ORDER — LOSARTAN POTASSIUM 100 MG/1
100 TABLET ORAL DAILY
Qty: 90 TABLET | Refills: 1 | OUTPATIENT
Start: 2024-11-11

## 2024-12-02 RX ORDER — HYDROXYZINE HYDROCHLORIDE 25 MG/1
TABLET, FILM COATED ORAL
Qty: 60 TABLET | Refills: 5 | Status: SHIPPED | OUTPATIENT
Start: 2024-12-02

## 2024-12-02 RX ORDER — SERTRALINE HYDROCHLORIDE 100 MG/1
150 TABLET, FILM COATED ORAL DAILY
Qty: 135 TABLET | Refills: 3 | OUTPATIENT
Start: 2024-12-02

## 2024-12-03 ENCOUNTER — OFFICE VISIT (OUTPATIENT)
Dept: PRIMARY CARE CLINIC | Age: 69
End: 2024-12-03

## 2024-12-03 VITALS
WEIGHT: 263 LBS | SYSTOLIC BLOOD PRESSURE: 128 MMHG | HEART RATE: 80 BPM | TEMPERATURE: 97 F | DIASTOLIC BLOOD PRESSURE: 82 MMHG | OXYGEN SATURATION: 98 % | BODY MASS INDEX: 48.1 KG/M2

## 2024-12-03 DIAGNOSIS — I10 ESSENTIAL (PRIMARY) HYPERTENSION: Primary | ICD-10-CM

## 2024-12-03 DIAGNOSIS — M81.0 OSTEOPOROSIS WITHOUT CURRENT PATHOLOGICAL FRACTURE, UNSPECIFIED OSTEOPOROSIS TYPE: ICD-10-CM

## 2024-12-03 DIAGNOSIS — E78.01 FAMILIAL HYPERCHOLESTEROLEMIA: ICD-10-CM

## 2024-12-03 DIAGNOSIS — I73.9 PVD (PERIPHERAL VASCULAR DISEASE) (HCC): ICD-10-CM

## 2024-12-03 DIAGNOSIS — R53.83 OTHER FATIGUE: ICD-10-CM

## 2024-12-03 DIAGNOSIS — Z23 FLU VACCINE NEED: ICD-10-CM

## 2024-12-03 DIAGNOSIS — I82.409 RECURRENT DEEP VEIN THROMBOSIS (DVT) (HCC): ICD-10-CM

## 2024-12-03 DIAGNOSIS — F41.1 GENERALIZED ANXIETY DISORDER: ICD-10-CM

## 2024-12-03 DIAGNOSIS — I26.99 RECURRENT PULMONARY EMBOLISM (HCC): ICD-10-CM

## 2024-12-03 DIAGNOSIS — F41.8 DEPRESSION WITH ANXIETY: ICD-10-CM

## 2024-12-03 RX ORDER — ALBUTEROL SULFATE 90 UG/1
2 INHALANT RESPIRATORY (INHALATION) 4 TIMES DAILY PRN
Qty: 1 EACH | Refills: 0 | Status: SHIPPED | OUTPATIENT
Start: 2024-12-03

## 2024-12-03 NOTE — PROGRESS NOTES
Dispense Refill    albuterol sulfate HFA (VENTOLIN HFA) 108 (90 Base) MCG/ACT inhaler Inhale 2 puffs into the lungs 4 times daily as needed for Wheezing 1 each 0    hydrOXYzine HCl (ATARAX) 25 MG tablet TAKE 1 TO 2 TABLETS BY MOUTH EVERY NIGHT AT BEDTIME AS NEEDED FOR INSOMNIA 60 tablet 5    ELIQUIS 5 MG TABS tablet TAKE 1 TABLET BY MOUTH TWICE DAILY 60 tablet 5    ezetimibe (ZETIA) 10 MG tablet TAKE 1 TABLET BY MOUTH DAILY 90 tablet 3    metoprolol tartrate (LOPRESSOR) 25 MG tablet TAKE 1 TABLET BY MOUTH TWICE DAILY 180 tablet 3    alendronate (FOSAMAX) 70 MG tablet TAKE 1 TABLET BY MOUTH EVERY 7 DAYS 12 tablet 1    losartan (COZAAR) 100 MG tablet Take 1 tablet by mouth daily 90 tablet 1    cyclobenzaprine (FLEXERIL) 10 MG tablet TAKE 1 TABLET BY MOUTH EVERY NIGHT AT BEDTIME AS NEEDED FOR MUSCLE SPASMS 90 tablet 2    pravastatin (PRAVACHOL) 40 MG tablet TAKE 1 TABLET BY MOUTH DAILY 90 tablet 3    sertraline (ZOLOFT) 100 MG tablet TAKE 1 AND 1/2 TABLETS BY MOUTH DAILY 135 tablet 3    aspirin 81 MG EC tablet Take 1 tablet by mouth daily      Latanoprostene Bunod 0.024 % SOLN Apply to eye       No current facility-administered medications for this visit.       No follow-ups on file.     Discussed use, benefit, and side effects of prescribed medications.         History, Medications, Allergies     Allergies   Allergen Reactions    Ceclor [Cefaclor]     Ciprofloxacin Hcl     Ultram [Tramadol]      fatigue    Zocor [Simvastatin]      cough     _______________________________________________________________  Past Medical History:   Diagnosis Date    Chronic midline low back pain without sciatica 10/2/2017    Essential (primary) hypertension 10/2/2017    Familial hypercholesterolemia 10/2/2017    Generalized anxiety disorder 10/2/2017    Glaucoma     Obstructive sleep apnea syndrome 10/2/2017    Osteoarthritis, knee      Past Surgical History:   Procedure Laterality Date    CHOLECYSTECTOMY      HYSTERECTOMY, TOTAL

## 2024-12-19 DIAGNOSIS — M17.0 PRIMARY OSTEOARTHRITIS OF BOTH KNEES: ICD-10-CM

## 2024-12-19 DIAGNOSIS — E78.01 FAMILIAL HYPERCHOLESTEROLEMIA: ICD-10-CM

## 2024-12-19 DIAGNOSIS — G89.29 CHRONIC MIDLINE LOW BACK PAIN WITHOUT SCIATICA: ICD-10-CM

## 2024-12-19 DIAGNOSIS — I10 ESSENTIAL (PRIMARY) HYPERTENSION: ICD-10-CM

## 2024-12-19 DIAGNOSIS — M54.50 CHRONIC MIDLINE LOW BACK PAIN WITHOUT SCIATICA: ICD-10-CM

## 2024-12-19 RX ORDER — ALBUTEROL SULFATE 90 UG/1
2 INHALANT RESPIRATORY (INHALATION) 4 TIMES DAILY
Qty: 54 EACH | Refills: 1 | Status: SHIPPED | OUTPATIENT
Start: 2024-12-19

## 2024-12-19 RX ORDER — EZETIMIBE 10 MG/1
TABLET ORAL
Qty: 90 TABLET | Refills: 0 | OUTPATIENT
Start: 2024-12-19

## 2024-12-19 RX ORDER — PRAVASTATIN SODIUM 40 MG
TABLET ORAL
Qty: 90 TABLET | Refills: 0 | OUTPATIENT
Start: 2024-12-19

## 2024-12-19 RX ORDER — METOPROLOL TARTRATE 25 MG/1
TABLET, FILM COATED ORAL
Qty: 180 TABLET | Refills: 0 | OUTPATIENT
Start: 2024-12-19

## 2024-12-19 RX ORDER — HYDROXYZINE HYDROCHLORIDE 25 MG/1
TABLET, FILM COATED ORAL
Qty: 270 TABLET | Refills: 1 | Status: SHIPPED | OUTPATIENT
Start: 2024-12-19

## 2024-12-19 RX ORDER — LOSARTAN POTASSIUM 100 MG/1
TABLET ORAL
Qty: 90 TABLET | Refills: 0 | OUTPATIENT
Start: 2024-12-19

## 2024-12-19 RX ORDER — CYCLOBENZAPRINE HCL 10 MG
TABLET ORAL
Qty: 90 TABLET | Refills: 0 | Status: SHIPPED | OUTPATIENT
Start: 2024-12-19

## 2024-12-19 RX ORDER — SERTRALINE HYDROCHLORIDE 100 MG/1
100 TABLET, FILM COATED ORAL
Qty: 180 TABLET | Refills: 0 | OUTPATIENT
Start: 2024-12-19

## 2024-12-19 RX ORDER — ALENDRONATE SODIUM 70 MG/1
70 TABLET ORAL
Qty: 12 TABLET | Refills: 1 | Status: SHIPPED | OUTPATIENT
Start: 2024-12-19

## 2025-02-03 DIAGNOSIS — E78.01 FAMILIAL HYPERCHOLESTEROLEMIA: ICD-10-CM

## 2025-02-03 DIAGNOSIS — M54.50 CHRONIC MIDLINE LOW BACK PAIN WITHOUT SCIATICA: ICD-10-CM

## 2025-02-03 DIAGNOSIS — G89.29 CHRONIC MIDLINE LOW BACK PAIN WITHOUT SCIATICA: ICD-10-CM

## 2025-02-03 DIAGNOSIS — M17.0 PRIMARY OSTEOARTHRITIS OF BOTH KNEES: ICD-10-CM

## 2025-02-03 DIAGNOSIS — I10 ESSENTIAL (PRIMARY) HYPERTENSION: ICD-10-CM

## 2025-02-04 RX ORDER — CYCLOBENZAPRINE HCL 10 MG
10 TABLET ORAL NIGHTLY PRN
Qty: 90 TABLET | Refills: 1 | Status: SHIPPED | OUTPATIENT
Start: 2025-02-04

## 2025-02-04 RX ORDER — PRAVASTATIN SODIUM 40 MG
TABLET ORAL
Qty: 90 TABLET | Refills: 0 | OUTPATIENT
Start: 2025-02-04

## 2025-02-04 RX ORDER — LOSARTAN POTASSIUM 100 MG/1
100 TABLET ORAL DAILY
Qty: 90 TABLET | Refills: 1 | Status: SHIPPED | OUTPATIENT
Start: 2025-02-04

## 2025-02-04 RX ORDER — EZETIMIBE 10 MG/1
TABLET ORAL
Qty: 90 TABLET | Refills: 0 | OUTPATIENT
Start: 2025-02-04

## 2025-02-26 DIAGNOSIS — E78.01 FAMILIAL HYPERCHOLESTEROLEMIA: ICD-10-CM

## 2025-02-27 RX ORDER — PRAVASTATIN SODIUM 40 MG
40 TABLET ORAL DAILY
Qty: 90 TABLET | Refills: 1 | Status: SHIPPED | OUTPATIENT
Start: 2025-02-27

## 2025-02-27 RX ORDER — EZETIMIBE 10 MG/1
10 TABLET ORAL DAILY
Qty: 90 TABLET | Refills: 1 | Status: SHIPPED | OUTPATIENT
Start: 2025-02-27

## 2025-03-18 RX ORDER — ALBUTEROL SULFATE 90 UG/1
AEROSOL, METERED RESPIRATORY (INHALATION)
Qty: 72 G | Refills: 3 | Status: SHIPPED | OUTPATIENT
Start: 2025-03-18

## 2025-04-17 RX ORDER — SERTRALINE HYDROCHLORIDE 100 MG/1
150 TABLET, FILM COATED ORAL DAILY
Qty: 135 TABLET | Refills: 1 | Status: SHIPPED | OUTPATIENT
Start: 2025-04-17

## 2025-06-02 RX ORDER — ALENDRONATE SODIUM 70 MG/1
70 TABLET ORAL
Qty: 12 TABLET | Refills: 1 | Status: SHIPPED | OUTPATIENT
Start: 2025-06-02

## 2025-06-30 ENCOUNTER — OFFICE VISIT (OUTPATIENT)
Dept: CARDIOLOGY CLINIC | Age: 70
End: 2025-06-30
Payer: MEDICARE

## 2025-06-30 VITALS
HEIGHT: 62 IN | OXYGEN SATURATION: 97 % | HEART RATE: 98 BPM | WEIGHT: 267 LBS | DIASTOLIC BLOOD PRESSURE: 86 MMHG | SYSTOLIC BLOOD PRESSURE: 126 MMHG | BODY MASS INDEX: 49.13 KG/M2

## 2025-06-30 DIAGNOSIS — M54.50 CHRONIC MIDLINE LOW BACK PAIN WITHOUT SCIATICA: ICD-10-CM

## 2025-06-30 DIAGNOSIS — I10 ESSENTIAL (PRIMARY) HYPERTENSION: ICD-10-CM

## 2025-06-30 DIAGNOSIS — G89.29 CHRONIC MIDLINE LOW BACK PAIN WITHOUT SCIATICA: ICD-10-CM

## 2025-06-30 DIAGNOSIS — R06.02 SHORTNESS OF BREATH: ICD-10-CM

## 2025-06-30 DIAGNOSIS — Z82.49 FAMILY HISTORY OF EARLY CAD: Primary | ICD-10-CM

## 2025-06-30 DIAGNOSIS — E78.5 DYSLIPIDEMIA: ICD-10-CM

## 2025-06-30 DIAGNOSIS — I10 ESSENTIAL HYPERTENSION: ICD-10-CM

## 2025-06-30 DIAGNOSIS — M17.0 PRIMARY OSTEOARTHRITIS OF BOTH KNEES: ICD-10-CM

## 2025-06-30 PROCEDURE — 3017F COLORECTAL CA SCREEN DOC REV: CPT | Performed by: NURSE PRACTITIONER

## 2025-06-30 PROCEDURE — G8399 PT W/DXA RESULTS DOCUMENT: HCPCS | Performed by: NURSE PRACTITIONER

## 2025-06-30 PROCEDURE — 1124F ACP DISCUSS-NO DSCNMKR DOCD: CPT | Performed by: NURSE PRACTITIONER

## 2025-06-30 PROCEDURE — 1159F MED LIST DOCD IN RCRD: CPT | Performed by: NURSE PRACTITIONER

## 2025-06-30 PROCEDURE — 1036F TOBACCO NON-USER: CPT | Performed by: NURSE PRACTITIONER

## 2025-06-30 PROCEDURE — G8427 DOCREV CUR MEDS BY ELIG CLIN: HCPCS | Performed by: NURSE PRACTITIONER

## 2025-06-30 PROCEDURE — 99214 OFFICE O/P EST MOD 30 MIN: CPT | Performed by: NURSE PRACTITIONER

## 2025-06-30 PROCEDURE — 3079F DIAST BP 80-89 MM HG: CPT | Performed by: NURSE PRACTITIONER

## 2025-06-30 PROCEDURE — 1090F PRES/ABSN URINE INCON ASSESS: CPT | Performed by: NURSE PRACTITIONER

## 2025-06-30 PROCEDURE — 3074F SYST BP LT 130 MM HG: CPT | Performed by: NURSE PRACTITIONER

## 2025-06-30 PROCEDURE — G8417 CALC BMI ABV UP PARAM F/U: HCPCS | Performed by: NURSE PRACTITIONER

## 2025-06-30 RX ORDER — LOSARTAN POTASSIUM 100 MG/1
100 TABLET ORAL DAILY
Qty: 90 TABLET | Refills: 3 | Status: SHIPPED | OUTPATIENT
Start: 2025-06-30

## 2025-06-30 RX ORDER — METOPROLOL TARTRATE 25 MG/1
25 TABLET, FILM COATED ORAL 2 TIMES DAILY
Qty: 180 TABLET | Refills: 3 | Status: SHIPPED | OUTPATIENT
Start: 2025-06-30

## 2025-06-30 RX ORDER — FUROSEMIDE 20 MG/1
20 TABLET ORAL DAILY PRN
Qty: 30 TABLET | Refills: 5 | Status: SHIPPED | OUTPATIENT
Start: 2025-06-30 | End: 2025-06-30

## 2025-06-30 RX ORDER — CYCLOBENZAPRINE HCL 10 MG
TABLET ORAL
Qty: 90 TABLET | Refills: 3 | Status: SHIPPED | OUTPATIENT
Start: 2025-06-30

## 2025-06-30 RX ORDER — FUROSEMIDE 20 MG/1
20 TABLET ORAL DAILY PRN
Qty: 30 TABLET | Refills: 5 | Status: SHIPPED | OUTPATIENT
Start: 2025-06-30

## 2025-06-30 ASSESSMENT — ENCOUNTER SYMPTOMS
WHEEZING: 0
SHORTNESS OF BREATH: 1
CHEST TIGHTNESS: 0
SORE THROAT: 0
COUGH: 0

## 2025-06-30 NOTE — PATIENT INSTRUCTIONS
Rodney at the [facility] [place] located on the first floor of [facility].   Enter through hospital main entrance and turn immediately to your left.    Date/Time:     Patient's contact number:  [phone] (home)     Echocardiogram -  No prep.      Takes approximately 30 min.    An echocardiogram uses sound waves to produce images of your heart. This commonly used test allows your doctor to see how your heart is beating and pumping blood. Your doctor can use the images from an echocardiogram to identify various abnormalities in the heart muscle and valves.    This test has 2 parts:   You will be asked to disrobe from the waist up and given a gown to wear. The technologist will then hook up an EKG monitor to you for the entire exam.   You will then have an ultrasound of your heart (echocardiogram) to assess the heart muscle, heart valves and heart function.     You may eat and take any medicines before the exam.     If you need to change your appointment, please call outpatient scheduling at [phone].

## 2025-06-30 NOTE — PROGRESS NOTES
Current Outpatient Medications   Medication Sig Dispense Refill    cyclobenzaprine (FLEXERIL) 10 MG tablet TAKE 1 TABLET EVERY NIGHT AS NEEDED FOR MUSCLE SPASM(S) 90 tablet 3    losartan (COZAAR) 100 MG tablet TAKE 1 TABLET EVERY DAY 90 tablet 3    metoprolol tartrate (LOPRESSOR) 25 MG tablet Take 1 tablet by mouth 2 times daily 180 tablet 3    furosemide (LASIX) 20 MG tablet Take 1 tablet by mouth daily as needed (lower extremity edema) 30 tablet 5    alendronate (FOSAMAX) 70 MG tablet Take 1 tablet by mouth every 7 days 12 tablet 1    sertraline (ZOLOFT) 100 MG tablet TAKE 1 AND 1/2 TABLETS BY MOUTH DAILY 135 tablet 1    VENTOLIN  (90 Base) MCG/ACT inhaler INHALE 2 PUFFS INTO THE LUNGS 4 TIMES DAILY 72 g 3    pravastatin (PRAVACHOL) 40 MG tablet Take 1 tablet by mouth daily 90 tablet 1    ezetimibe (ZETIA) 10 MG tablet Take 1 tablet by mouth daily 90 tablet 1    apixaban (ELIQUIS) 5 MG TABS tablet Take 1 tablet by mouth 2 times daily 180 tablet 1    hydrOXYzine HCl (ATARAX) 25 MG tablet  270 tablet 1    Latanoprostene Bunod 0.024 % SOLN Apply to eye      aspirin 81 MG EC tablet Take 1 tablet by mouth daily       No current facility-administered medications for this visit.     Allergies: Ceclor [cefaclor], Ciprofloxacin hcl, Ultram [tramadol], and Zocor [simvastatin]  Past Medical History:   Diagnosis Date    Chronic midline low back pain without sciatica 10/2/2017    Essential (primary) hypertension 10/2/2017    Familial hypercholesterolemia 10/2/2017    Generalized anxiety disorder 10/2/2017    Glaucoma     Obstructive sleep apnea syndrome 10/2/2017    Osteoarthritis, knee      Past Surgical History:   Procedure Laterality Date    CHOLECYSTECTOMY      HYSTERECTOMY, TOTAL ABDOMINAL (CERVIX REMOVED)      KNEE ARTHROSCOPY      TUBAL LIGATION       Family History   Problem Relation Age of Onset    Heart Disease Mother     Heart Disease Father     Other Maternal Grandmother         aneurysm     Social

## 2025-07-01 ENCOUNTER — RESULTS FOLLOW-UP (OUTPATIENT)
Dept: CARDIOLOGY CLINIC | Age: 70
End: 2025-07-01

## 2025-07-01 DIAGNOSIS — E78.01 FAMILIAL HYPERCHOLESTEROLEMIA: ICD-10-CM

## 2025-07-01 DIAGNOSIS — R06.02 SHORTNESS OF BREATH: ICD-10-CM

## 2025-07-01 DIAGNOSIS — R53.83 OTHER FATIGUE: ICD-10-CM

## 2025-07-01 LAB
ALBUMIN SERPL-MCNC: 4.1 G/DL (ref 3.5–5.2)
ALP SERPL-CCNC: 41 U/L (ref 35–104)
ALT SERPL-CCNC: 15 U/L (ref 10–35)
ANION GAP SERPL CALCULATED.3IONS-SCNC: 12 MMOL/L (ref 8–16)
ANION GAP SERPL CALCULATED.3IONS-SCNC: 13 MMOL/L (ref 8–16)
AST SERPL-CCNC: 24 U/L (ref 10–35)
BASOPHILS # BLD: 0 K/UL (ref 0–0.2)
BASOPHILS NFR BLD: 0.4 % (ref 0–1)
BILIRUB SERPL-MCNC: 0.3 MG/DL (ref 0.2–1.2)
BNP BLD-MCNC: 180 PG/ML (ref 0–124)
BUN SERPL-MCNC: 9 MG/DL (ref 8–23)
BUN SERPL-MCNC: 9 MG/DL (ref 8–23)
CALCIUM SERPL-MCNC: 8.8 MG/DL (ref 8.8–10.2)
CALCIUM SERPL-MCNC: 8.8 MG/DL (ref 8.8–10.2)
CHLORIDE SERPL-SCNC: 105 MMOL/L (ref 98–107)
CHLORIDE SERPL-SCNC: 105 MMOL/L (ref 98–107)
CHOLEST SERPL-MCNC: 176 MG/DL (ref 0–199)
CO2 SERPL-SCNC: 24 MMOL/L (ref 22–29)
CO2 SERPL-SCNC: 26 MMOL/L (ref 22–29)
CREAT SERPL-MCNC: 0.7 MG/DL (ref 0.5–0.9)
CREAT SERPL-MCNC: 0.7 MG/DL (ref 0.5–0.9)
EOSINOPHIL # BLD: 0.2 K/UL (ref 0–0.6)
EOSINOPHIL NFR BLD: 2.1 % (ref 0–5)
ERYTHROCYTE [DISTWIDTH] IN BLOOD BY AUTOMATED COUNT: 14.5 % (ref 11.5–14.5)
GLUCOSE SERPL-MCNC: 107 MG/DL (ref 70–99)
GLUCOSE SERPL-MCNC: 108 MG/DL (ref 70–99)
HCT VFR BLD AUTO: 41.8 % (ref 37–47)
HDLC SERPL-MCNC: 67 MG/DL (ref 40–60)
HGB BLD-MCNC: 13.8 G/DL (ref 12–16)
IMM GRANULOCYTES # BLD: 0 K/UL
LDLC SERPL CALC-MCNC: 86 MG/DL
LYMPHOCYTES # BLD: 2.6 K/UL (ref 1.1–4.5)
LYMPHOCYTES NFR BLD: 35.1 % (ref 20–40)
MCH RBC QN AUTO: 29.8 PG (ref 27–31)
MCHC RBC AUTO-ENTMCNC: 33 G/DL (ref 33–37)
MCV RBC AUTO: 90.3 FL (ref 81–99)
MONOCYTES # BLD: 0.6 K/UL (ref 0–0.9)
MONOCYTES NFR BLD: 7.6 % (ref 0–10)
NEUTROPHILS # BLD: 4.1 K/UL (ref 1.5–7.5)
NEUTS SEG NFR BLD: 54.3 % (ref 50–65)
PLATELET # BLD AUTO: 243 K/UL (ref 130–400)
PMV BLD AUTO: 10.4 FL (ref 9.4–12.3)
POTASSIUM SERPL-SCNC: 4.1 MMOL/L (ref 3.5–5.1)
POTASSIUM SERPL-SCNC: 4.2 MMOL/L (ref 3.5–5.1)
PROT SERPL-MCNC: 6.6 G/DL (ref 6.4–8.3)
RBC # BLD AUTO: 4.63 M/UL (ref 4.2–5.4)
SODIUM SERPL-SCNC: 142 MMOL/L (ref 136–145)
SODIUM SERPL-SCNC: 143 MMOL/L (ref 136–145)
T4 FREE SERPL-MCNC: 0.87 NG/DL (ref 0.93–1.7)
TRIGL SERPL-MCNC: 115 MG/DL (ref 0–149)
TSH SERPL DL<=0.005 MIU/L-ACNC: 1.61 UIU/ML (ref 0.27–4.2)
WBC # BLD AUTO: 7.5 K/UL (ref 4.8–10.8)

## 2025-07-02 ENCOUNTER — HOSPITAL ENCOUNTER (OUTPATIENT)
Age: 70
Discharge: HOME | End: 2025-07-04
Payer: MEDICARE

## 2025-07-02 VITALS
DIASTOLIC BLOOD PRESSURE: 89 MMHG | SYSTOLIC BLOOD PRESSURE: 166 MMHG | BODY MASS INDEX: 49.69 KG/M2 | WEIGHT: 270 LBS | HEIGHT: 62 IN

## 2025-07-02 DIAGNOSIS — R06.02 SHORTNESS OF BREATH: ICD-10-CM

## 2025-07-02 LAB
ECHO AV AREA PEAK VELOCITY: 2.2 CM2
ECHO AV AREA VTI: 2.2 CM2
ECHO AV AREA/BSA PEAK VELOCITY: 1 CM2/M2
ECHO AV AREA/BSA VTI: 1 CM2/M2
ECHO AV MEAN GRADIENT: 4 MMHG
ECHO AV MEAN VELOCITY: 1 M/S
ECHO AV PEAK GRADIENT: 8 MMHG
ECHO AV PEAK VELOCITY: 1.4 M/S
ECHO AV VELOCITY RATIO: 0.79
ECHO AV VTI: 27 CM
ECHO BSA: 2.31 M2
ECHO EST RA PRESSURE: 3 MMHG
ECHO IVC PROX: 1.7 CM
ECHO LA AREA 2C: 19 CM2
ECHO LA AREA 4C: 17.1 CM2
ECHO LA DIAMETER INDEX: 1.75 CM/M2
ECHO LA DIAMETER: 3.8 CM
ECHO LA MAJOR AXIS: 5 CM
ECHO LA MINOR AXIS: 4.9 CM
ECHO LA VOL BP: 53 ML (ref 22–52)
ECHO LA VOL MOD A2C: 60 ML (ref 22–52)
ECHO LA VOL MOD A4C: 47 ML (ref 22–52)
ECHO LA VOL/BSA BIPLANE: 24 ML/M2 (ref 16–34)
ECHO LA VOLUME INDEX MOD A2C: 28 ML/M2 (ref 16–34)
ECHO LA VOLUME INDEX MOD A4C: 22 ML/M2 (ref 16–34)
ECHO LV E' LATERAL VELOCITY: 13.5 CM/S
ECHO LV E' SEPTAL VELOCITY: 9.14 CM/S
ECHO LV EDV A2C: 113 ML
ECHO LV EDV A4C: 125 ML
ECHO LV EDV INDEX A4C: 58 ML/M2
ECHO LV EDV NDEX A2C: 52 ML/M2
ECHO LV EF PHYSICIAN: 60 %
ECHO LV EJECTION FRACTION A2C: 68 %
ECHO LV EJECTION FRACTION A4C: 68 %
ECHO LV EJECTION FRACTION BIPLANE: 67 % (ref 55–100)
ECHO LV ESV A2C: 36 ML
ECHO LV ESV A4C: 40 ML
ECHO LV ESV INDEX A2C: 17 ML/M2
ECHO LV ESV INDEX A4C: 18 ML/M2
ECHO LV FRACTIONAL SHORTENING: 36 % (ref 28–44)
ECHO LV INTERNAL DIMENSION DIASTOLE INDEX: 2.17 CM/M2
ECHO LV INTERNAL DIMENSION DIASTOLIC: 4.7 CM (ref 3.9–5.3)
ECHO LV INTERNAL DIMENSION SYSTOLIC INDEX: 1.38 CM/M2
ECHO LV INTERNAL DIMENSION SYSTOLIC: 3 CM
ECHO LV IVSD: 1.2 CM (ref 0.6–0.9)
ECHO LV MASS 2D: 187.5 G (ref 67–162)
ECHO LV MASS INDEX 2D: 86.4 G/M2 (ref 43–95)
ECHO LV POSTERIOR WALL DIASTOLIC: 1 CM (ref 0.6–0.9)
ECHO LV RELATIVE WALL THICKNESS RATIO: 0.43
ECHO LVOT AREA: 2.8 CM2
ECHO LVOT AV VTI INDEX: 0.78
ECHO LVOT DIAM: 1.9 CM
ECHO LVOT MEAN GRADIENT: 3 MMHG
ECHO LVOT PEAK GRADIENT: 5 MMHG
ECHO LVOT PEAK GRADIENT: 5 MMHG
ECHO LVOT PEAK VELOCITY: 1.1 M/S
ECHO LVOT STROKE VOLUME INDEX: 27.6 ML/M2
ECHO LVOT SV: 59.8 ML
ECHO LVOT VTI: 21.1 CM
ECHO MV A VELOCITY: 1.18 M/S
ECHO MV E DECELERATION TIME (DT): 292 MS
ECHO MV E VELOCITY: 0.96 M/S
ECHO MV E/A RATIO: 0.81
ECHO MV E/E' LATERAL: 7.11
ECHO MV E/E' RATIO (AVERAGED): 8.81
ECHO MV E/E' SEPTAL: 10.5
ECHO RV TAPSE: 2 CM (ref 1.7–?)

## 2025-07-02 PROCEDURE — C8929 TTE W OR WO FOL WCON,DOPPLER: HCPCS

## 2025-07-02 PROCEDURE — 6360000004 HC RX CONTRAST MEDICATION: Performed by: NURSE PRACTITIONER

## 2025-07-02 PROCEDURE — 93306 TTE W/DOPPLER COMPLETE: CPT | Performed by: INTERNAL MEDICINE

## 2025-07-02 RX ADMIN — SULFUR HEXAFLUORIDE 2 ML: 60.7; .19; .19 INJECTION, POWDER, LYOPHILIZED, FOR SUSPENSION INTRAVENOUS; INTRAVESICAL at 13:45

## 2025-07-07 RX ORDER — APIXABAN 5 MG/1
5 TABLET, FILM COATED ORAL 2 TIMES DAILY
Qty: 180 TABLET | Refills: 3 | OUTPATIENT
Start: 2025-07-07

## 2025-07-09 ENCOUNTER — OFFICE VISIT (OUTPATIENT)
Dept: CARDIOLOGY CLINIC | Age: 70
End: 2025-07-09
Payer: MEDICARE

## 2025-07-09 VITALS
DIASTOLIC BLOOD PRESSURE: 74 MMHG | RESPIRATION RATE: 20 BRPM | WEIGHT: 269 LBS | OXYGEN SATURATION: 96 % | BODY MASS INDEX: 49.5 KG/M2 | HEART RATE: 94 BPM | SYSTOLIC BLOOD PRESSURE: 138 MMHG | HEIGHT: 62 IN

## 2025-07-09 DIAGNOSIS — Z82.49 FAMILY HISTORY OF EARLY CAD: Primary | ICD-10-CM

## 2025-07-09 DIAGNOSIS — I10 ESSENTIAL (PRIMARY) HYPERTENSION: ICD-10-CM

## 2025-07-09 DIAGNOSIS — E78.5 DYSLIPIDEMIA: ICD-10-CM

## 2025-07-09 DIAGNOSIS — I10 ESSENTIAL HYPERTENSION: ICD-10-CM

## 2025-07-09 PROCEDURE — 99214 OFFICE O/P EST MOD 30 MIN: CPT | Performed by: NURSE PRACTITIONER

## 2025-07-09 PROCEDURE — 1124F ACP DISCUSS-NO DSCNMKR DOCD: CPT | Performed by: NURSE PRACTITIONER

## 2025-07-09 PROCEDURE — G8427 DOCREV CUR MEDS BY ELIG CLIN: HCPCS | Performed by: NURSE PRACTITIONER

## 2025-07-09 PROCEDURE — 1036F TOBACCO NON-USER: CPT | Performed by: NURSE PRACTITIONER

## 2025-07-09 PROCEDURE — 3078F DIAST BP <80 MM HG: CPT | Performed by: NURSE PRACTITIONER

## 2025-07-09 PROCEDURE — G8417 CALC BMI ABV UP PARAM F/U: HCPCS | Performed by: NURSE PRACTITIONER

## 2025-07-09 PROCEDURE — 1090F PRES/ABSN URINE INCON ASSESS: CPT | Performed by: NURSE PRACTITIONER

## 2025-07-09 PROCEDURE — 3075F SYST BP GE 130 - 139MM HG: CPT | Performed by: NURSE PRACTITIONER

## 2025-07-09 PROCEDURE — 1159F MED LIST DOCD IN RCRD: CPT | Performed by: NURSE PRACTITIONER

## 2025-07-09 PROCEDURE — G8399 PT W/DXA RESULTS DOCUMENT: HCPCS | Performed by: NURSE PRACTITIONER

## 2025-07-09 PROCEDURE — 3017F COLORECTAL CA SCREEN DOC REV: CPT | Performed by: NURSE PRACTITIONER

## 2025-07-09 RX ORDER — LOSARTAN POTASSIUM 100 MG/1
50 TABLET ORAL 2 TIMES DAILY
Qty: 90 TABLET | Refills: 3 | Status: SHIPPED | OUTPATIENT
Start: 2025-07-09

## 2025-07-09 ASSESSMENT — ENCOUNTER SYMPTOMS
WHEEZING: 0
CHEST TIGHTNESS: 0
SHORTNESS OF BREATH: 0
SORE THROAT: 0
COUGH: 0

## 2025-07-09 NOTE — PROGRESS NOTES
[facility]   Established Patient Office Visit  [address]  [address]  [facility] [address]  [phone]        OFFICE VISIT:  2025    Lizz Stewart - : 1955    Reason For Visit:  Lizz is a 69 y.o. female who is here for Results    1. Family history of early CAD    2. Essential hypertension    3. Dyslipidemia    4. Essential (primary) hypertension        Patient with a family history of coronary artery disease which includes her father having had bypass surgery and stents.  Patient is a non-smoker.  However, she has been exposed to secondhand smoke at home.     Patient with a history of hypertension,  hyperlipidemia current DVT[facility]PE.  [facility]    Patient was last seen in the office on 2025 with complaints of shortness of breath and some lower extremity edema.  We did order 2D echo as well as BNP and BMP.    2D echo showed:    Left Ventricle: Normal left ventricular systolic function with a visually estimated EF of 60 - 65%. EF by 2D Simpsons Biplane is 67%. Left ventricle size is normal. Mildly increased wall thickness. Findings consistent with mild concentric hypertrophy. Normal wall motion. Normal diastolic function.    Mitral Valve: Mild regurgitation.    Image quality is suboptimal. Contrast used: Lumason. Technically difficult study due to patient's body habitus.    BNP slightly elevated at 180.  We did tell patient was okay to take the Lasix 20 mg daily.      Patient presents to clinic today for results of the aforementioned test.  Results of 2D echo given to patient.  Patient states taking the Lasix has improved her lower extremity edema but has not completely resolved.  She is also noted her blood pressures being elevated in the evenings more so.    Subjective    Lizz Stewart is a 69 y.o. female with the following history as recorded in [facility]:    Patient Active Problem List    Diagnosis Date Noted    PVD (peripheral vascular disease) 08/15/2022    Class 3

## 2025-07-14 SDOH — ECONOMIC STABILITY: INCOME INSECURITY: IN THE LAST 12 MONTHS, WAS THERE A TIME WHEN YOU WERE NOT ABLE TO PAY THE MORTGAGE OR RENT ON TIME?: NO

## 2025-07-14 SDOH — HEALTH STABILITY: PHYSICAL HEALTH: ON AVERAGE, HOW MANY DAYS PER WEEK DO YOU ENGAGE IN MODERATE TO STRENUOUS EXERCISE (LIKE A BRISK WALK)?: 0 DAYS

## 2025-07-14 SDOH — HEALTH STABILITY: PHYSICAL HEALTH: ON AVERAGE, HOW MANY MINUTES DO YOU ENGAGE IN EXERCISE AT THIS LEVEL?: 0 MIN

## 2025-07-14 SDOH — ECONOMIC STABILITY: FOOD INSECURITY: WITHIN THE PAST 12 MONTHS, YOU WORRIED THAT YOUR FOOD WOULD RUN OUT BEFORE YOU GOT MONEY TO BUY MORE.: NEVER TRUE

## 2025-07-14 SDOH — ECONOMIC STABILITY: FOOD INSECURITY: WITHIN THE PAST 12 MONTHS, THE FOOD YOU BOUGHT JUST DIDN'T LAST AND YOU DIDN'T HAVE MONEY TO GET MORE.: NEVER TRUE

## 2025-07-14 SDOH — ECONOMIC STABILITY: TRANSPORTATION INSECURITY
IN THE PAST 12 MONTHS, HAS THE LACK OF TRANSPORTATION KEPT YOU FROM MEDICAL APPOINTMENTS OR FROM GETTING MEDICATIONS?: NO

## 2025-07-14 ASSESSMENT — PATIENT HEALTH QUESTIONNAIRE - PHQ9
SUM OF ALL RESPONSES TO PHQ QUESTIONS 1-9: 4
4. FEELING TIRED OR HAVING LITTLE ENERGY: SEVERAL DAYS
SUM OF ALL RESPONSES TO PHQ QUESTIONS 1-9: 4
9. THOUGHTS THAT YOU WOULD BE BETTER OFF DEAD, OR OF HURTING YOURSELF: NOT AT ALL
2. FEELING DOWN, DEPRESSED OR HOPELESS: SEVERAL DAYS
6. FEELING BAD ABOUT YOURSELF - OR THAT YOU ARE A FAILURE OR HAVE LET YOURSELF OR YOUR FAMILY DOWN: NOT AT ALL
7. TROUBLE CONCENTRATING ON THINGS, SUCH AS READING THE NEWSPAPER OR WATCHING TELEVISION: NOT AT ALL
1. LITTLE INTEREST OR PLEASURE IN DOING THINGS: SEVERAL DAYS
5. POOR APPETITE OR OVEREATING: NOT AT ALL
8. MOVING OR SPEAKING SO SLOWLY THAT OTHER PEOPLE COULD HAVE NOTICED. OR THE OPPOSITE, BEING SO FIGETY OR RESTLESS THAT YOU HAVE BEEN MOVING AROUND A LOT MORE THAN USUAL: NOT AT ALL
10. IF YOU CHECKED OFF ANY PROBLEMS, HOW DIFFICULT HAVE THESE PROBLEMS MADE IT FOR YOU TO DO YOUR WORK, TAKE CARE OF THINGS AT HOME, OR GET ALONG WITH OTHER PEOPLE: SOMEWHAT DIFFICULT
3. TROUBLE FALLING OR STAYING ASLEEP: SEVERAL DAYS

## 2025-07-14 ASSESSMENT — LIFESTYLE VARIABLES
HOW MANY STANDARD DRINKS CONTAINING ALCOHOL DO YOU HAVE ON A TYPICAL DAY: 1
HOW OFTEN DO YOU HAVE A DRINK CONTAINING ALCOHOL: 2
HOW OFTEN DO YOU HAVE SIX OR MORE DRINKS ON ONE OCCASION: 1
HOW OFTEN DO YOU HAVE A DRINK CONTAINING ALCOHOL: MONTHLY OR LESS
HOW MANY STANDARD DRINKS CONTAINING ALCOHOL DO YOU HAVE ON A TYPICAL DAY: 1 OR 2

## 2025-07-15 ENCOUNTER — OFFICE VISIT (OUTPATIENT)
Dept: PRIMARY CARE CLINIC | Age: 70
End: 2025-07-15

## 2025-07-15 VITALS
HEIGHT: 62 IN | BODY MASS INDEX: 48.21 KG/M2 | OXYGEN SATURATION: 99 % | SYSTOLIC BLOOD PRESSURE: 126 MMHG | DIASTOLIC BLOOD PRESSURE: 74 MMHG | WEIGHT: 262 LBS | HEART RATE: 79 BPM | TEMPERATURE: 96.5 F

## 2025-07-15 DIAGNOSIS — F41.8 DEPRESSION WITH ANXIETY: ICD-10-CM

## 2025-07-15 DIAGNOSIS — E78.01 FAMILIAL HYPERCHOLESTEROLEMIA: ICD-10-CM

## 2025-07-15 DIAGNOSIS — R53.83 OTHER FATIGUE: ICD-10-CM

## 2025-07-15 DIAGNOSIS — M81.0 OSTEOPOROSIS WITHOUT CURRENT PATHOLOGICAL FRACTURE, UNSPECIFIED OSTEOPOROSIS TYPE: ICD-10-CM

## 2025-07-15 DIAGNOSIS — I73.9 PVD (PERIPHERAL VASCULAR DISEASE): ICD-10-CM

## 2025-07-15 DIAGNOSIS — I26.99 RECURRENT PULMONARY EMBOLISM (HCC): ICD-10-CM

## 2025-07-15 DIAGNOSIS — I10 ESSENTIAL (PRIMARY) HYPERTENSION: Primary | ICD-10-CM

## 2025-07-15 DIAGNOSIS — Z00.00 ANNUAL PHYSICAL EXAM: ICD-10-CM

## 2025-07-15 NOTE — PROGRESS NOTES
Medicare Annual Wellness Visit - Subsequent        Name: Lizz Stewart Today’s Date: 7/15/2025   MRN: [id number] Sex: Female   Age: 69 y.o. Ethnicity: Non- / Non    : 1955 Race: White (non-)        CareTeam (Including outside providers/suppliers regularly involved in providing care):   Patient Care Team:  Justen Cruz MD as PCP - General (Family Medicine)  Justen Cruz MD as PCP - Empaneled Provider  Mariposa Norman DO as Consulting Physician (Vascular Surgery)  Atiya Pool APRN - NP as Nurse Practitioner (Nurse Practitioner, Adult Health)      Lizz Stewart is here for   Chief Complaint   Patient presents with    Medicare AWV        Screenings for behavioral, psychosocial and functional/safety risks, and cognitive dysfunction are all negative except as indicated below. These results, as well as other patient data from the Health Risk Assessment form, are documented in Flowsheets linked to this Encounter.    Allergies   Allergen Reactions    Ceclor [Cefaclor]     Ciprofloxacin Hcl     Ultram [Tramadol]      fatigue    Zocor [Simvastatin]      cough       Prior to Visit Medications    Medication Sig Taking? Authorizing Provider   losartan (COZAAR) 100 MG tablet Take 0.5 tablets by mouth in the morning and at bedtime Yes Atiya Pool APRN - NP   cyclobenzaprine (FLEXERIL) 10 MG tablet TAKE 1 TABLET EVERY NIGHT AS NEEDED FOR MUSCLE SPASM(S) Yes Justen Cruz MD   metoprolol tartrate (LOPRESSOR) 25 MG tablet Take 1 tablet by mouth 2 times daily Yes Atiya Pool APRN - NP   furosemide (LASIX) 20 MG tablet Take 1 tablet by mouth daily as needed (lower extremity edema) Yes Atiya Pool APRN - NP   alendronate (FOSAMAX) 70 MG tablet Take 1 tablet by mouth every 7 days Yes Justen Cruz MD   sertraline (ZOLOFT) 100 MG tablet TAKE 1 AND 1/2 TABLETS BY MOUTH DAILY Yes Justen Cruz MD   VENTOLIN  (90 Base) MCG/ACT inhaler INHALE 2 PUFFS INTO

## 2025-07-15 NOTE — PATIENT INSTRUCTIONS
Examine your lifestyle and the barriers to bad and good habits and how you can design your life to make better choices      Make it EASY to do the RIGHT THINGS.    1)  You can choose to Get 150 min/week of moderate exercise (can talk but can't sing) or 75 min/week of vigorous exercise (can't talk)   Examples: Walking, treadmill, bicycling, attending a gym.    2)  You can choose to Get 7-9 hours of sleep per night    Allow enough time each night to get adequate sleep.  Keep regular evening hours, same to bed and getting up every day.    3)  You can choose to Strength Train 2 x a week on non-consecutive days   Bodyweight exercises such push ups, sit ups, pilates or yoga   Purchase resistance bands to perform exercises   Utilize phone apps such as: [employer] or [employer]   Contact your local gym for a     4)  You can choose good nutrition.    Only eat your goal weight (in lbs) x 10 calories/day (Goal weight 150 lbs x 10 = 1500 calories per day)  Get 5 servings of Vegetables/day   Choose to eat a healthy diet such as the Mediterranean diet.    Plant based diets reduce risk of heart attack/stroke and will help you feel full on less food.    Avoid highly processed foods and processed carbohydrates.   Utilize apps to track your food:  Examples: Loseit, Myfitnesspal, or CalorieCounter by [employer]   Choose to follow a program.   Examples: Weight Watchers, Krystal Salazar, WholeLaurel or Nutrisystem   Utilize apps identify and improve eating habits:   Examples: Eat Right Now, Noom    5)  You can choose to drink less alcohol: Drink less than 1-2 drinks/day no more than 7 drinks per week.    Alcohol will disrupt your sleep and add calories to your day    6)  You can choose to Practice Mindfulness and work on stress reduction.    Utilize apps such as Calm, Headspace, Abide  Contact local behavioral health specialists      Small changes every day will add up

## 2025-07-15 NOTE — PROGRESS NOTES
[facility]  [facility] PRIMARY [address]  [address]  [place] [address]  Dept: [phone]  Dept Fax: [phone]  Loc: [phone]    Lizz Stewart is a 69 y.o. female who presents today for her medical conditions/complaints as noted below.  Lizz Stewart is here for Medicare AWV         Patient History:      Family history of coronary artery disease.  -[facility] cardiology     Splenic artery aneurysm  -[facility] vascular  - March 1, 2024: Unchanged peripherally calcified splenic artery aneurysm measuring 2 x 1.5 cm.     History of DVT and pulmonary embolism.  -1989 right lower extremity DVT and pulmonary embolism  -June 21, 2016: Evidence of partially occlusive chronic appearing deep vein thrombosis in the right lower extremity.     Osteoporosis  -February 17, 2023: Bone density  - -Left femoral neck: T-score -2.5  - -Lumbar spine: T-score 0.8  - August 6, 2024: Started on alendronate        pthx        Subjective:   CC:  Here today to discuss the following:    July 9, 2025  Follow-up with [facility] cardiology  - She states she was started on Lasix which helped with her lower extremity swelling.    Since her last visit her common-law  passed away.  She states she is grieving.  Overall she feels she is managing well and does not wish to change any of her medication.    Hypertension  Compliant with medications.  No adverse effects from medication.  No lightheadedness, palpitations, or chest pain.      Hyperlipidemia  Tolerating current cholesterol medication without side effects. . Attempting to reduce processed sugar and cholesterol from diet.    Depression with Anxiety  Compliant with medications.  No adverse effects from medication.  Depression and anxiety symptoms are stable today. No suicidal or homicidal ideation. Excessive worry, insomnia, and anxiousness are stable. Energy, concentration, and apathy are stable.        Review of Systems   Constitutional: Negative for

## 2025-07-23 ENCOUNTER — OFFICE VISIT (OUTPATIENT)
Dept: CARDIOLOGY CLINIC | Age: 70
End: 2025-07-23
Payer: MEDICARE

## 2025-07-23 VITALS
HEIGHT: 62 IN | WEIGHT: 277 LBS | BODY MASS INDEX: 50.97 KG/M2 | DIASTOLIC BLOOD PRESSURE: 82 MMHG | RESPIRATION RATE: 16 BRPM | HEART RATE: 80 BPM | SYSTOLIC BLOOD PRESSURE: 120 MMHG | OXYGEN SATURATION: 95 %

## 2025-07-23 DIAGNOSIS — E78.01 FAMILIAL HYPERCHOLESTEROLEMIA: ICD-10-CM

## 2025-07-23 DIAGNOSIS — E78.5 DYSLIPIDEMIA: Primary | ICD-10-CM

## 2025-07-23 DIAGNOSIS — I10 ESSENTIAL HYPERTENSION: ICD-10-CM

## 2025-07-23 PROCEDURE — 99214 OFFICE O/P EST MOD 30 MIN: CPT | Performed by: NURSE PRACTITIONER

## 2025-07-23 PROCEDURE — 1124F ACP DISCUSS-NO DSCNMKR DOCD: CPT | Performed by: NURSE PRACTITIONER

## 2025-07-23 PROCEDURE — 1159F MED LIST DOCD IN RCRD: CPT | Performed by: NURSE PRACTITIONER

## 2025-07-23 PROCEDURE — G8417 CALC BMI ABV UP PARAM F/U: HCPCS | Performed by: NURSE PRACTITIONER

## 2025-07-23 PROCEDURE — 1036F TOBACCO NON-USER: CPT | Performed by: NURSE PRACTITIONER

## 2025-07-23 PROCEDURE — 3079F DIAST BP 80-89 MM HG: CPT | Performed by: NURSE PRACTITIONER

## 2025-07-23 PROCEDURE — G8399 PT W/DXA RESULTS DOCUMENT: HCPCS | Performed by: NURSE PRACTITIONER

## 2025-07-23 PROCEDURE — 3074F SYST BP LT 130 MM HG: CPT | Performed by: NURSE PRACTITIONER

## 2025-07-23 PROCEDURE — 3017F COLORECTAL CA SCREEN DOC REV: CPT | Performed by: NURSE PRACTITIONER

## 2025-07-23 PROCEDURE — G8427 DOCREV CUR MEDS BY ELIG CLIN: HCPCS | Performed by: NURSE PRACTITIONER

## 2025-07-23 PROCEDURE — 1090F PRES/ABSN URINE INCON ASSESS: CPT | Performed by: NURSE PRACTITIONER

## 2025-07-23 RX ORDER — EZETIMIBE 10 MG/1
10 TABLET ORAL DAILY
Qty: 90 TABLET | Refills: 3 | Status: SHIPPED | OUTPATIENT
Start: 2025-07-23

## 2025-07-23 RX ORDER — PRAVASTATIN SODIUM 40 MG
40 TABLET ORAL DAILY
Qty: 90 TABLET | Refills: 3 | Status: SHIPPED | OUTPATIENT
Start: 2025-07-23

## 2025-07-23 ASSESSMENT — ENCOUNTER SYMPTOMS
SORE THROAT: 0
COUGH: 0
WHEEZING: 0
SHORTNESS OF BREATH: 1

## 2025-07-23 NOTE — PROGRESS NOTES
[facility] Card[facility]   Established Patient Office Visit  [address]  [address]  [facility] [address]  [phone]        OFFICE VISIT:  2025    Lizz Stewart - : 1955    Reason For Visit:  Lizz is a 69 y.o. female who is here for Blood Pressure Check (Pt adv better but some soa)    1. Dyslipidemia    2. Essential hypertension          Patient with a family history of coronary artery disease which includes her father having had bypass surgery and stents.  Patient is a non-smoker.  However, she has been exposed to secondhand smoke at home.     Patient with a history of hypertension,  hyperlipidemia current DVT/PE.  On Eliquis.     Patient was seen in the office on 2025 with complaints of shortness of breath and some lower extremity edema.  We did order 2D echo as well as BNP and BMP.     2D echo showed:    Left Ventricle: Normal left ventricular systolic function with a visually estimated EF of 60 - 65%. EF by 2D Simpsons Biplane is 67%. Left ventricle size is normal. Mildly increased wall thickness. Findings consistent with mild concentric hypertrophy. Normal wall motion. Normal diastolic function.    Mitral Valve: Mild regurgitation.    Image quality is suboptimal. Contrast used: Lumason. Technically difficult study due to patient's body habitus.     BNP slightly elevated at 180.  We did tell patient was okay to take the Lasix 20 mg daily.     Clinic appointment 2025 patient stated that the Lasix had improved her lower extremity edema but was not completely resolved.  Also did notice blood pressures being elevated in the evening more.  Did instruct patient to take Lasix 40 mg daily for 3 days then go back to 2 mg daily.  She was on losartan 100 mg in the morning we did split that to 50 mg twice a day.  She was to continue to take her Lopressor 25 mg twice daily.  She presents to clinic today for medication response.  Blood pressure is much better controlled with splitting the

## 2025-08-21 RX ORDER — APIXABAN 5 MG/1
5 TABLET, FILM COATED ORAL 2 TIMES DAILY
Qty: 180 TABLET | Refills: 0 | Status: SHIPPED | OUTPATIENT
Start: 2025-08-21